# Patient Record
Sex: FEMALE | Race: BLACK OR AFRICAN AMERICAN | NOT HISPANIC OR LATINO | Employment: FULL TIME | URBAN - METROPOLITAN AREA
[De-identification: names, ages, dates, MRNs, and addresses within clinical notes are randomized per-mention and may not be internally consistent; named-entity substitution may affect disease eponyms.]

---

## 2017-03-29 ENCOUNTER — CONVERSION ENCOUNTER (OUTPATIENT)
Dept: MAMMOGRAPHY | Facility: HOSPITAL | Age: 42
End: 2017-03-29

## 2017-04-28 ENCOUNTER — CONVERSION ENCOUNTER (OUTPATIENT)
Dept: MAMMOGRAPHY | Facility: HOSPITAL | Age: 42
End: 2017-04-28

## 2017-09-28 ENCOUNTER — CONVERSION ENCOUNTER (OUTPATIENT)
Dept: MAMMOGRAPHY | Facility: HOSPITAL | Age: 42
End: 2017-09-28

## 2018-01-25 ENCOUNTER — OFFICE VISIT CONVERTED (OUTPATIENT)
Dept: PULMONOLOGY | Facility: CLINIC | Age: 43
End: 2018-01-25
Attending: INTERNAL MEDICINE

## 2018-02-09 ENCOUNTER — OFFICE VISIT CONVERTED (OUTPATIENT)
Dept: PULMONOLOGY | Facility: CLINIC | Age: 43
End: 2018-02-09
Attending: INTERNAL MEDICINE

## 2018-08-23 ENCOUNTER — OFFICE VISIT CONVERTED (OUTPATIENT)
Dept: PULMONOLOGY | Facility: CLINIC | Age: 43
End: 2018-08-23
Attending: INTERNAL MEDICINE

## 2018-10-24 ENCOUNTER — CONVERSION ENCOUNTER (OUTPATIENT)
Dept: OTHER | Facility: HOSPITAL | Age: 43
End: 2018-10-24

## 2019-01-18 ENCOUNTER — HOSPITAL ENCOUNTER (OUTPATIENT)
Dept: PERIOP | Facility: HOSPITAL | Age: 44
Setting detail: HOSPITAL OUTPATIENT SURGERY
Discharge: HOME | End: 2019-01-18
Attending: SPECIALIST

## 2019-01-18 LAB
ANION GAP SERPL CALC-SCNC: 13 MMOL/L (ref 8–19)
CHLORIDE SERPL-SCNC: 106 MMOL/L (ref 99–111)
CONV CO2: 25 MMOL/L (ref 22–32)
POTASSIUM SERPL-SCNC: 4.4 MMOL/L (ref 3.5–5.3)
SODIUM SERPL-SCNC: 140 MMOL/L (ref 135–147)

## 2019-01-29 ENCOUNTER — HOSPITAL ENCOUNTER (OUTPATIENT)
Dept: CARDIOLOGY | Facility: HOSPITAL | Age: 44
Discharge: HOME | End: 2019-01-29
Attending: NURSE PRACTITIONER

## 2019-02-04 ENCOUNTER — OFFICE VISIT CONVERTED (OUTPATIENT)
Dept: PODIATRY | Facility: CLINIC | Age: 44
End: 2019-02-04
Attending: PODIATRIST

## 2019-09-13 ENCOUNTER — HOSPITAL ENCOUNTER (OUTPATIENT)
Dept: LAB | Facility: HOSPITAL | Age: 44
Discharge: HOME | End: 2019-09-13
Attending: NURSE PRACTITIONER

## 2019-09-13 ENCOUNTER — OFFICE VISIT CONVERTED (OUTPATIENT)
Dept: PULMONOLOGY | Facility: CLINIC | Age: 44
End: 2019-09-13
Attending: INTERNAL MEDICINE

## 2019-09-13 LAB
25(OH)D3 SERPL-MCNC: 29.6 NG/ML (ref 30–100)
ALBUMIN SERPL-MCNC: 4.3 G/DL (ref 3.5–5)
ALBUMIN/GLOB SERPL: 1.7 {RATIO} (ref 1.4–2.6)
ALP SERPL-CCNC: 82 U/L (ref 42–98)
ALT SERPL-CCNC: 10 U/L (ref 10–40)
ANION GAP SERPL CALC-SCNC: 17 MMOL/L (ref 8–19)
AST SERPL-CCNC: 12 U/L (ref 15–50)
BASOPHILS # BLD AUTO: 0.08 10*3/UL (ref 0–0.2)
BASOPHILS NFR BLD AUTO: 0.7 % (ref 0–3)
BILIRUB SERPL-MCNC: 0.16 MG/DL (ref 0.2–1.3)
BUN SERPL-MCNC: 10 MG/DL (ref 5–25)
BUN/CREAT SERPL: 12 {RATIO} (ref 6–20)
CALCIUM SERPL-MCNC: 8.9 MG/DL (ref 8.7–10.4)
CHLORIDE SERPL-SCNC: 104 MMOL/L (ref 99–111)
CHOLEST SERPL-MCNC: 193 MG/DL (ref 107–200)
CHOLEST/HDLC SERPL: 3.5 {RATIO} (ref 3–6)
CONV ABS IMM GRAN: 0.04 10*3/UL (ref 0–0.2)
CONV CO2: 24 MMOL/L (ref 22–32)
CONV CREATININE URINE, RANDOM: 183.8 MG/DL (ref 10–300)
CONV IMMATURE GRAN: 0.4 % (ref 0–1.8)
CONV MICROALBUM.,U,RANDOM: 24.2 MG/L (ref 0–20)
CONV TOTAL PROTEIN: 6.8 G/DL (ref 6.3–8.2)
CREAT UR-MCNC: 0.82 MG/DL (ref 0.5–0.9)
DEPRECATED RDW RBC AUTO: 52.4 FL (ref 36.4–46.3)
EOSINOPHIL # BLD AUTO: 0.3 10*3/UL (ref 0–0.7)
EOSINOPHIL # BLD AUTO: 2.6 % (ref 0–7)
ERYTHROCYTE [DISTWIDTH] IN BLOOD BY AUTOMATED COUNT: 16.1 % (ref 11.7–14.4)
EST. AVERAGE GLUCOSE BLD GHB EST-MCNC: 114 MG/DL
GFR SERPLBLD BASED ON 1.73 SQ M-ARVRAT: >60 ML/MIN/{1.73_M2}
GLOBULIN UR ELPH-MCNC: 2.5 G/DL (ref 2–3.5)
GLUCOSE SERPL-MCNC: 85 MG/DL (ref 65–99)
HBA1C MFR BLD: 5.6 % (ref 3.5–5.7)
HCT VFR BLD AUTO: 38.7 % (ref 37–47)
HDLC SERPL-MCNC: 55 MG/DL (ref 40–60)
HGB BLD-MCNC: 12.1 G/DL (ref 12–16)
LDLC SERPL CALC-MCNC: 116 MG/DL (ref 70–100)
LYMPHOCYTES # BLD AUTO: 2.85 10*3/UL (ref 1–5)
LYMPHOCYTES NFR BLD AUTO: 25.1 % (ref 20–45)
MCH RBC QN AUTO: 27.6 PG (ref 27–31)
MCHC RBC AUTO-ENTMCNC: 31.3 G/DL (ref 33–37)
MCV RBC AUTO: 88.4 FL (ref 81–99)
MICROALBUMIN/CREAT UR: 13.2 MG/G{CRE} (ref 0–35)
MONOCYTES # BLD AUTO: 0.66 10*3/UL (ref 0.2–1.2)
MONOCYTES NFR BLD AUTO: 5.8 % (ref 3–10)
NEUTROPHILS # BLD AUTO: 7.41 10*3/UL (ref 2–8)
NEUTROPHILS NFR BLD AUTO: 65.4 % (ref 30–85)
NRBC CBCN: 0 % (ref 0–0.7)
OSMOLALITY SERPL CALC.SUM OF ELEC: 290 MOSM/KG (ref 273–304)
PLATELET # BLD AUTO: 412 10*3/UL (ref 130–400)
PMV BLD AUTO: 9.5 FL (ref 9.4–12.3)
POTASSIUM SERPL-SCNC: 4.1 MMOL/L (ref 3.5–5.3)
RBC # BLD AUTO: 4.38 10*6/UL (ref 4.2–5.4)
SODIUM SERPL-SCNC: 141 MMOL/L (ref 135–147)
T4 FREE SERPL-MCNC: 1.4 NG/DL (ref 0.9–1.8)
TRIGL SERPL-MCNC: 109 MG/DL (ref 40–150)
TSH SERPL-ACNC: 0.79 M[IU]/L (ref 0.27–4.2)
VIT B12 SERPL-MCNC: 410 PG/ML (ref 211–911)
VLDLC SERPL-MCNC: 22 MG/DL (ref 5–37)
WBC # BLD AUTO: 11.34 10*3/UL (ref 4.8–10.8)

## 2020-01-07 ENCOUNTER — OFFICE VISIT CONVERTED (OUTPATIENT)
Dept: PULMONOLOGY | Facility: CLINIC | Age: 45
End: 2020-01-07
Attending: PHYSICIAN ASSISTANT

## 2020-01-24 ENCOUNTER — HOSPITAL ENCOUNTER (OUTPATIENT)
Dept: GENERAL RADIOLOGY | Facility: HOSPITAL | Age: 45
Discharge: HOME | End: 2020-01-24
Attending: OBSTETRICS & GYNECOLOGY

## 2020-05-19 ENCOUNTER — TELEMEDICINE CONVERTED (OUTPATIENT)
Dept: GASTROENTEROLOGY | Facility: CLINIC | Age: 45
End: 2020-05-19
Attending: NURSE PRACTITIONER

## 2020-05-19 ENCOUNTER — HOSPITAL ENCOUNTER (OUTPATIENT)
Dept: LAB | Facility: HOSPITAL | Age: 45
Discharge: HOME | End: 2020-05-19
Attending: NURSE PRACTITIONER

## 2020-05-19 LAB
BASOPHILS # BLD AUTO: 0.09 10*3/UL (ref 0–0.2)
BASOPHILS NFR BLD AUTO: 0.7 % (ref 0–3)
CONV ABS IMM GRAN: 0.11 10*3/UL (ref 0–0.2)
CONV IMMATURE GRAN: 0.8 % (ref 0–1.8)
DEPRECATED RDW RBC AUTO: 53.1 FL (ref 36.4–46.3)
EOSINOPHIL # BLD AUTO: 0.54 10*3/UL (ref 0–0.7)
EOSINOPHIL # BLD AUTO: 4 % (ref 0–7)
ERYTHROCYTE [DISTWIDTH] IN BLOOD BY AUTOMATED COUNT: 16.3 % (ref 11.7–14.4)
HCT VFR BLD AUTO: 40.2 % (ref 37–47)
HGB BLD-MCNC: 12.4 G/DL (ref 12–16)
IRON SATN MFR SERPL: 12 % (ref 20–55)
IRON SERPL-MCNC: 53 UG/DL (ref 60–170)
LYMPHOCYTES # BLD AUTO: 3.26 10*3/UL (ref 1–5)
LYMPHOCYTES NFR BLD AUTO: 24.1 % (ref 20–45)
MCH RBC QN AUTO: 27.6 PG (ref 27–31)
MCHC RBC AUTO-ENTMCNC: 30.8 G/DL (ref 33–37)
MCV RBC AUTO: 89.5 FL (ref 81–99)
MONOCYTES # BLD AUTO: 0.76 10*3/UL (ref 0.2–1.2)
MONOCYTES NFR BLD AUTO: 5.6 % (ref 3–10)
NEUTROPHILS # BLD AUTO: 8.77 10*3/UL (ref 2–8)
NEUTROPHILS NFR BLD AUTO: 64.8 % (ref 30–85)
NRBC CBCN: 0 % (ref 0–0.7)
PLATELET # BLD AUTO: 368 10*3/UL (ref 130–400)
PMV BLD AUTO: 9.7 FL (ref 9.4–12.3)
RBC # BLD AUTO: 4.49 10*6/UL (ref 4.2–5.4)
TIBC SERPL-MCNC: 458 UG/DL (ref 245–450)
TRANSFERRIN SERPL-MCNC: 320 MG/DL (ref 250–380)
WBC # BLD AUTO: 13.53 10*3/UL (ref 4.8–10.8)

## 2020-07-14 LAB — SARS-COV-2 RNA SPEC QL NAA+PROBE: NOT DETECTED

## 2020-07-15 ENCOUNTER — HOSPITAL ENCOUNTER (OUTPATIENT)
Dept: GASTROENTEROLOGY | Facility: HOSPITAL | Age: 45
Setting detail: HOSPITAL OUTPATIENT SURGERY
Discharge: HOME | End: 2020-07-15
Attending: INTERNAL MEDICINE

## 2020-07-23 ENCOUNTER — HOSPITAL ENCOUNTER (OUTPATIENT)
Dept: LAB | Facility: HOSPITAL | Age: 45
Discharge: HOME | End: 2020-07-23

## 2020-07-23 LAB
25(OH)D3 SERPL-MCNC: 23.4 NG/ML (ref 30–100)
ANION GAP SERPL CALC-SCNC: 18 MMOL/L (ref 8–19)
BASOPHILS # BLD AUTO: 0.07 10*3/UL (ref 0–0.2)
BASOPHILS NFR BLD AUTO: 0.6 % (ref 0–3)
BUN SERPL-MCNC: 11 MG/DL (ref 5–25)
BUN/CREAT SERPL: 12 {RATIO} (ref 6–20)
CALCIUM SERPL-MCNC: 8.9 MG/DL (ref 8.7–10.4)
CHLORIDE SERPL-SCNC: 100 MMOL/L (ref 99–111)
CHOLEST SERPL-MCNC: 240 MG/DL (ref 107–200)
CHOLEST/HDLC SERPL: 4 {RATIO} (ref 3–6)
CONV ABS IMM GRAN: 0.09 10*3/UL (ref 0–0.2)
CONV CO2: 24 MMOL/L (ref 22–32)
CONV IMMATURE GRAN: 0.7 % (ref 0–1.8)
CREAT UR-MCNC: 0.91 MG/DL (ref 0.5–0.9)
DEPRECATED RDW RBC AUTO: 53.7 FL (ref 36.4–46.3)
EOSINOPHIL # BLD AUTO: 0.56 10*3/UL (ref 0–0.7)
EOSINOPHIL # BLD AUTO: 4.6 % (ref 0–7)
ERYTHROCYTE [DISTWIDTH] IN BLOOD BY AUTOMATED COUNT: 16.5 % (ref 11.7–14.4)
EST. AVERAGE GLUCOSE BLD GHB EST-MCNC: 131 MG/DL
FOLATE SERPL-MCNC: 18.2 NG/ML (ref 4.8–20)
GFR SERPLBLD BASED ON 1.73 SQ M-ARVRAT: >60 ML/MIN/{1.73_M2}
GLUCOSE SERPL-MCNC: 86 MG/DL (ref 65–99)
HBA1C MFR BLD: 6.2 % (ref 3.5–5.7)
HCT VFR BLD AUTO: 41.9 % (ref 37–47)
HDLC SERPL-MCNC: 60 MG/DL (ref 40–60)
HGB BLD-MCNC: 13.1 G/DL (ref 12–16)
IRON SATN MFR SERPL: 7 % (ref 20–55)
IRON SERPL-MCNC: 31 UG/DL (ref 60–170)
LDLC SERPL CALC-MCNC: 123 MG/DL (ref 70–100)
LYMPHOCYTES # BLD AUTO: 2.77 10*3/UL (ref 1–5)
LYMPHOCYTES NFR BLD AUTO: 22.7 % (ref 20–45)
MCH RBC QN AUTO: 27.5 PG (ref 27–31)
MCHC RBC AUTO-ENTMCNC: 31.3 G/DL (ref 33–37)
MCV RBC AUTO: 87.8 FL (ref 81–99)
MONOCYTES # BLD AUTO: 0.63 10*3/UL (ref 0.2–1.2)
MONOCYTES NFR BLD AUTO: 5.2 % (ref 3–10)
NEUTROPHILS # BLD AUTO: 8.1 10*3/UL (ref 2–8)
NEUTROPHILS NFR BLD AUTO: 66.2 % (ref 30–85)
NRBC CBCN: 0 % (ref 0–0.7)
OSMOLALITY SERPL CALC.SUM OF ELEC: 285 MOSM/KG (ref 273–304)
PLATELET # BLD AUTO: 392 10*3/UL (ref 130–400)
PMV BLD AUTO: 9.5 FL (ref 9.4–12.3)
POTASSIUM SERPL-SCNC: 4 MMOL/L (ref 3.5–5.3)
RBC # BLD AUTO: 4.77 10*6/UL (ref 4.2–5.4)
SODIUM SERPL-SCNC: 138 MMOL/L (ref 135–147)
T4 FREE SERPL-MCNC: 1.1 NG/DL (ref 0.9–1.8)
TIBC SERPL-MCNC: 465 UG/DL (ref 245–450)
TRANSFERRIN SERPL-MCNC: 325 MG/DL (ref 250–380)
TRIGL SERPL-MCNC: 287 MG/DL (ref 40–150)
TSH SERPL-ACNC: 0.65 M[IU]/L (ref 0.27–4.2)
VIT B12 SERPL-MCNC: 443 PG/ML (ref 211–911)
VLDLC SERPL-MCNC: 57 MG/DL (ref 5–37)
WBC # BLD AUTO: 12.22 10*3/UL (ref 4.8–10.8)

## 2020-08-05 ENCOUNTER — HOSPITAL ENCOUNTER (OUTPATIENT)
Dept: LAB | Facility: HOSPITAL | Age: 45
Discharge: HOME | End: 2020-08-05

## 2020-08-05 LAB
BASOPHILS # BLD AUTO: 0.07 10*3/UL (ref 0–0.2)
BASOPHILS # BLD: 0 % (ref 0–3)
BASOPHILS NFR BLD AUTO: 0.6 % (ref 0–3)
CONV ABS BANDS: 0 % (ref 1–5)
CONV ABS IMM GRAN: 0.11 10*3/UL (ref 0–0.2)
CONV ANISOCYTES: SLIGHT
CONV HYPOCHROMIA IN BLOOD BY LIGHT MICROSCOPY: SLIGHT
CONV IMMATURE GRAN: 0.9 % (ref 0–1.8)
CONV SEGMENTED NEUTROPHILS: 73 % (ref 45–70)
DEPRECATED RDW RBC AUTO: 54.5 FL (ref 36.4–46.3)
EOSINOPHIL # BLD AUTO: 0.51 10*3/UL (ref 0–0.7)
EOSINOPHIL # BLD AUTO: 4.3 % (ref 0–7)
EOSINOPHIL NFR BLD AUTO: 3 % (ref 0–7)
ERYTHROCYTE [DISTWIDTH] IN BLOOD BY AUTOMATED COUNT: 17 % (ref 11.7–14.4)
ERYTHROCYTE [SEDIMENTATION RATE] IN BLOOD: 47 MM/H (ref 0–20)
HCT VFR BLD AUTO: 40.4 % (ref 37–47)
HGB BLD-MCNC: 12.3 G/DL (ref 12–16)
LYMPHOCYTES # BLD AUTO: 2.39 10*3/UL (ref 1–5)
LYMPHOCYTES NFR BLD AUTO: 20 % (ref 20–45)
MACROCYTES BLD QL SMEAR: SLIGHT
MCH RBC QN AUTO: 26.8 PG (ref 27–31)
MCHC RBC AUTO-ENTMCNC: 30.4 G/DL (ref 33–37)
MCV RBC AUTO: 88 FL (ref 81–99)
MICROCYTES BLD QL: SLIGHT
MONOCYTES # BLD AUTO: 0.62 10*3/UL (ref 0.2–1.2)
MONOCYTES NFR BLD AUTO: 5.2 % (ref 3–10)
MONOCYTES NFR BLD MANUAL: 5 % (ref 3–10)
NEUTROPHILS # BLD AUTO: 8.24 10*3/UL (ref 2–8)
NEUTROPHILS NFR BLD AUTO: 69 % (ref 30–85)
NRBC CBCN: 0 % (ref 0–0.7)
NUC CELL # PRT MANUAL: 0 /100{WBCS}
PLAT MORPH BLD: NORMAL
PLATELET # BLD AUTO: 350 10*3/UL (ref 130–400)
PMV BLD AUTO: 9.6 FL (ref 9.4–12.3)
POIKILOCYTOSIS BLD QL SMEAR: SLIGHT
POLYCHROMASIA BLD QL SMEAR: SLIGHT
PROLACTIN SERPL-MCNC: 12.66 NG/ML
RBC # BLD AUTO: 4.59 10*6/UL (ref 4.2–5.4)
SMALL PLATELETS BLD QL SMEAR: ADEQUATE
VARIANT LYMPHS NFR BLD MANUAL: 19 % (ref 20–45)
WBC # BLD AUTO: 11.94 10*3/UL (ref 4.8–10.8)

## 2020-08-06 LAB
ASO AB SERPL-ACNC: 20 [IU]/ML (ref 0–200)
CONV RHEUMATOID FACTOR IGM: <10 [IU]/ML (ref 0–14)
CRP SERPL-MCNC: 18.8 MG/L (ref 0–5)
DSDNA AB SER-ACNC: NEGATIVE [IU]/ML
ENA AB SER IA-ACNC: NEGATIVE {RATIO}
URATE SERPL-MCNC: 4.5 MG/DL (ref 2.5–7.5)

## 2020-08-07 LAB — CONV TREPONEMA PALLIDUM (RPR) WITH FTA-ABS, TP-PA REFLEXES: NON REACTIVE

## 2020-08-27 ENCOUNTER — TELEMEDICINE CONVERTED (OUTPATIENT)
Dept: GASTROENTEROLOGY | Facility: CLINIC | Age: 45
End: 2020-08-27
Attending: NURSE PRACTITIONER

## 2020-09-08 ENCOUNTER — OFFICE VISIT CONVERTED (OUTPATIENT)
Dept: PULMONOLOGY | Facility: CLINIC | Age: 45
End: 2020-09-08
Attending: PHYSICIAN ASSISTANT

## 2020-09-16 ENCOUNTER — PROCEDURE VISIT CONVERTED (OUTPATIENT)
Dept: GASTROENTEROLOGY | Facility: CLINIC | Age: 45
End: 2020-09-16
Attending: NURSE PRACTITIONER

## 2020-11-24 ENCOUNTER — OFFICE VISIT CONVERTED (OUTPATIENT)
Dept: PULMONOLOGY | Facility: CLINIC | Age: 45
End: 2020-11-24
Attending: INTERNAL MEDICINE

## 2021-03-17 ENCOUNTER — HOSPITAL ENCOUNTER (OUTPATIENT)
Dept: MAMMOGRAPHY | Facility: HOSPITAL | Age: 46
Discharge: HOME | End: 2021-03-17
Attending: OBSTETRICS & GYNECOLOGY

## 2021-03-17 ENCOUNTER — TELEMEDICINE CONVERTED (OUTPATIENT)
Dept: GASTROENTEROLOGY | Facility: CLINIC | Age: 46
End: 2021-03-17
Attending: NURSE PRACTITIONER

## 2021-05-10 ENCOUNTER — OFFICE VISIT CONVERTED (OUTPATIENT)
Dept: GASTROENTEROLOGY | Facility: CLINIC | Age: 46
End: 2021-05-10
Attending: NURSE PRACTITIONER

## 2021-05-10 NOTE — PROCEDURES
Procedure Note      Patient Name: Jaycee Le   Patient ID: [id number]   Sex: Female   YOB: 1975    Primary Care Provider: Kia De León RD   Referring Provider: Kia De León RD    Visit Date: September 16, 2020    Provider: TAMIA Gannon   Location: [facility]   Location Address: [address] [place] [address], [address] [place] [address]  [id number]   Location Phone: [phone]          Jaycee Le presents today for Capsule Endoscopy Procedure for anemia. She successfully swallowed capsule without difficulty or complications.           Assessment  · H/O endoscopy     V45.89/Z98.890  · Anemia due to blood loss     280.0/D50.0      Plan  · Orders  o Capsule endoscopy esophagus through ileum [facility] (36425) - - 09/16/2020  · Instructions  o Please see Capsule endoscopy report scanned into patient record.             Electronically Signed by: Joan Sun, -Author on September 16, 2020 08:13:56 AM

## 2021-05-10 NOTE — H&P
History and Physical      Patient Name: Jaycee Le   Patient ID: [id number]   Sex: Female   YOB: 1975    Primary Care Provider: Provider Other Other    Visit Date: May 19, 2020    Provider: TAMIA Gannon   Location: [facility]   Location Address: [address] [place] [address], [address]  [place], [place]  [id number]   Location Phone: [phone]          Chief Complaint  · Abdominal pain      History Of Present Illness  Video Conferencing Visit  Jaycee Le is a 44 year old /Black female who is presenting for evaluation via video conferencing. Verbal consent obtained before beginning visit.   The following staff were present during this visit: Joan Sun MA, TAMIA Gannon      She presents for evaluation of worsening acid reflux.      Previously dx with IBS and GERD.  EGD/Colon about 10 years ago at [facility].      She reports that she's been using dicyclomine for pain and cramps.  She is using 20 mg PRN abdominal cramping with relief.  Also given linzess 145 mcg per PCP.  States that when she's constipated, after she has a bowel movement experiences rectal bleeding.  The past few months, she has noticed that the bleeding is worse.  She is straining with bowel movements.  Reports having a bowel movement 2-3 times per day.  Bleeding is not present with every bowel movement.  She states that when bleeding occurs, there is a large amount of blood.      She reports that belching has become severe.  This begins upon waking in the morning.  She is avoiding bothersome foods with some improvement.  TUMS PRN.  Denies dysphagia.             Past Medical History  Achilles tendon rupture; Anxiety; Apnea; Arthritis; Asthma; Bipolar disorder; Broken Bones; Bunion; Chronic bronchitis; Clot; COPD (chronic obstructive pulmonary disease); Depression; Foot pain, bilateral; Foot ulcer; Forgetfulness; GERD (gastroesophageal reflux disease); Magalie's deformity, right; Heart  Murmur; Heel pain; Hemorrhoids; High blood pressure; Hyperlipidemia; Hypertension; Hypothyroidism; IBS (irritable bowel syndrome); Kidney disease; Migraine Headaches; NIght Sweats; Numbness in feet; Obesity; Pain: Knee; Patella Chondromalacia; Patella Maltracking; Plantar fascial fibromatosis of both feet; Plantar wart; Polycystic ovarian syndrome; Shortness of breath; Sinus Trouble         Past Surgical History  Achilles tendon repair; Cesarian Section; Colonoscopy; Tubal ligation; Uterine ablation         Medication List  amlodipine 10 mg oral tablet; bupropion HCl 150 mg oral tablet extended release 24 hr; cetirizine 10 mg oral tablet; gemfibrozil 600 mg oral tablet; levothyroxine 25 mcg oral tablet; metformin 500 mg oral tablet; mirtazapine 7.5 mg oral tablet; montelukast 10 mg oral tablet; multivitamin oral tablet; NuLYTELY with Flavor Packs 420 gram oral recon soln; sertraline 50 mg oral tablet; Symbicort 160-4.5 mcg/actuation inhalation HFA aerosol inhaler; Vitamin D2 1,250 mcg (50,000 unit) oral capsule         Allergy List  Darvocet-N 100; doxycycline hyclate; lisinopril; oxycodone; vancomycin         Family Medical History  Lung Neoplasm, Malignant; Heart Disease; Diabetes, unspecified type; Prostate Cancer; Throat Cancer; Colon Cancer; Ovarian Cancer, Family History         Reproductive History   0 Para 0 0 0 0       Social History  Alcohol (Light); Cocaine (Former); Exercises regularly; MWM Goal Statement:; Tobacco (Current some day)         Immunizations  Name Date Admin   Influenza          Review of Systems  · Constitutional  o Denies  o : chills, fever  · Eyes  o Denies  o : blurred vision, changes in vision  · Cardiovascular  o Denies  o : chest pain, irregular heart beats  · Respiratory  o Denies  o : shortness of breath, cough  · Gastrointestinal  o Admits  o : See HPI  · Genitourinary  o Denies  o : dysuria, blood in urine  · Integument  o Denies  o : rash, new skin  "lesions  · Neurologic  o Denies  o : tingling or numbness, seizures  · Musculoskeletal  o Denies  o : joint pain, joint swelling  · Endocrine  o Denies  o : weight gain, weight loss  · Psychiatric  o Denies  o : anxiety, depression      Vitals  Date Time BP Position Site L\R Cuff Size HR RR TEMP (F) WT  HT  BMI kg/m2 BSA m2 O2 Sat HC       05/19/2020 09:37 AM         260lbs 0oz 5'  2\" 47.55 2.27           Physical Examination  · Constitutional  o Appearance  o : well developed, well-nourished, in no acute distress  · Eyes  o Vision  o :   § Visual Fields  § : eyes move symmetrical in all directions  o Sclerae  o : anicteric  o Pupils and Irises  o : pupils equal and symmetrical  · Neck  o Inspection/Palpation  o : supple  · Respiratory  o Respiratory Effort  o : breathing unlabored  · Lymphatic  o Neck  o : no palpable lymphadenopathy  · Skin and Subcutaneous Tissue  o General Inspection  o : no lesions present, no areas of discoloration  · Psychiatric  o General  o : Alert and oriented x3  o Mood and Affect  o : Mood and affect are appropriate to circumstances              Assessment  · Lower abdominal pain     789.09/R10.30  · Constipation     564.00/K59.00  · Dyspepsia     536.8/R10.13  · Rectal bleeding     569.3/K62.5      Plan  · Orders  o Iron panel (iron, TIBC, transferrin saturation) (50700, 67884, 78365) - - 05/19/2020  o CBC with Auto Diff [facility] (50720) - - 05/19/2020  o Consent for Colonoscopy with Possible Biopsy - Possible risks/complications, benefits, and alternatives to surgical or invasive procedure have been explained to patient and/or legal guardian. -Patient has been evaluated and can tolerate anesthesia and/or sedation. Risks, benefits, and alternatives to anesthesia and sedation have been explained to patient and/or legal guardian. (50861) - - 05/19/2020  o Consent for Esophagogastroduodenoscopy (EGD) - Possible risks/complications, benefits, and alternatives to surgical or invasive procedure " have been explained to patient and/or legal guardian. - Patient has been evaluated and can tolerate anesthesia and/or sedation. Risks, benefits, and alternatives to anesthesia and sedation have been explained to patient and/or legal guardian. (27834) - - 05/19/2020  · Medications  o Medications have been Reconciled  · Instructions  o Handouts provided: Pre-procedure instructions including date and time and location of procedure.  o PLAN: Proceed with procedure. Patient understands risks and benefits and is willing to proceed. Understands the risks include, but are not limited to, bleeding and/or perforation.  o Information given on current diagnoses.  o Electronically Identified Patient Education Materials Provided Electronically  · Disposition  o Follow Up after procedure            Electronically Signed by: TAMIA Gannon -Author on May 19, 2020 12:04:06 PM

## 2021-05-13 NOTE — PROGRESS NOTES
Progress Note      Patient Name: Jaycee Le   Patient ID: [id number]   Sex: Female   YOB: 1975    Primary Care Provider: Provider Other Other    Visit Date: August 27, 2020    Provider: TAMIA Gannon   Location: [facility]   Location Address: [address] [place] [address], [address]  [place], [place]  [id number]   Location Phone: [phone]          Chief Complaint  · Follow up Abdominal pain       History Of Present Illness  Video Conferencing Visit  Jaycee Le is a 44 year old /Black female who is presenting for evaluation via video conferencing via zoom. Verbal consent obtained before beginning visit.   The following staff were present during this visit: Joan Sun MA      Ms. Le presents for follow-up of lower abdominal pain, constipation, dyspepsia and rectal bleeding.    7/15/2020 EGD/colonoscopy-erythema in the GE junction-acute and chronic inflammation with changes suggestive of reflux esophagitis.  Small hiatal hernia.  Erythema in the antrum-features suggestive of mild reactive gastropathy.  Normal duodenum.  2 mm polyp in the transverse colon-hyperplastic, completely removed.  Grade 1 internal hemorrhoids.    5/19/2020 CBC:  WBC 13.53, Hgb 12.4, Hct 40.2, plt 368.   Iron profile: Iron 53, Iron sat 12%.     She reports that rectal bleeding is resolved.  She is not straining with bowel movements.      Reports that she's taking protonix 20 mg daily.  Belching is improved.  States that she's had 1-2 episodes of reflux in the past few weeks.    She is moving to [place].       Past Medical History  Achilles tendon rupture; Anxiety; Apnea; Arthritis; Asthma; Bipolar disorder; Broken Bones; Bunion; Chronic bronchitis; Clot; COPD (chronic obstructive pulmonary disease); Depression; Foot pain, bilateral; Foot ulcer; Forgetfulness; GERD (gastroesophageal reflux disease); Magalie's deformity, right; Heart Murmur; Heel pain; Hemorrhoids; High blood  "pressure; Hyperlipidemia; Hypertension; Hypothyroidism; IBS (irritable bowel syndrome); Kidney disease; Migraine Headaches; NIght Sweats; Numbness in feet; Obesity; Pain: Knee; Patella Chondromalacia; Patella Maltracking; Plantar fascial fibromatosis of both feet; Plantar wart; Polycystic ovarian syndrome; Shortness of breath; Sinus Trouble         Past Surgical History  Achilles tendon repair; Cesarian Section; Colonoscopy; Tubal ligation; Uterine ablation         Medication List  amlodipine 10 mg oral tablet; bupropion HCl 150 mg oral tablet extended release 24 hr; cetirizine 10 mg oral tablet; gemfibrozil 600 mg oral tablet; levothyroxine 25 mcg oral tablet; metformin 500 mg oral tablet; mirtazapine 7.5 mg oral tablet; montelukast 10 mg oral tablet; multivitamin oral tablet; pantoprazole 40 mg oral tablet,delayed release (DR/EC); sertraline 50 mg oral tablet; Symbicort 160-4.5 mcg/actuation inhalation HFA aerosol inhaler; Vitamin D2 1,250 mcg (50,000 unit) oral capsule         Allergy List  Darvocet-N 100; doxycycline hyclate; lisinopril; oxycodone; vancomycin         Family Medical History  Lung Neoplasm, Malignant; Heart Disease; Diabetes, unspecified type; Prostate Cancer; Throat Cancer; Colon Cancer; Ovarian Cancer, Family History         Reproductive History   0 Para 0 0 0 0       Social History  Alcohol (Light); Cocaine (Former); Exercises regularly; MWM Goal Statement:; Tobacco (Current some day)         Immunizations  Name Date Admin   Influenza          Review of Systems  · Constitutional  o Denies  o : chills, fever  · Cardiovascular  o Denies  o : chest pain, irregular heart beats  · Respiratory  o Denies  o : cough, shortness of breath  · Gastrointestinal  o Admits  o : see HPI   · Endocrine  o Denies  o : weight gain, weight loss      Vitals  Date Time BP Position Site L\R Cuff Size HR RR TEMP (F) WT  HT  BMI kg/m2 BSA m2 O2 Sat        2020 10:04 AM         260lbs 0oz 5'  2\" 47.55 2.27 "           Physical Examination  · Constitutional  o Appearance  o : Healthy-appearing, awake and alert in no acute distress  · Head and Face  o Head  o : Normocephalic with no worriesome skin lesions  · Eyes  o Vision  o :   § Visual Fields  § : eyes move symmetrical in all directions  o Sclerae  o : sclerae anicteric  o Pupils and Irises  o : pupils equal and symmetrical  · Neck  o Inspection/Palpation  o : normal appearance, trachea midline  · Respiratory  o Respiratory Effort  o : Breathing is unlabored.  · Skin and Subcutaneous Tissue  o General Inspection  o : no lesions present, no areas of discoloration  · Psychiatric  o General  o : Alert and oriented x3  o Mood and Affect  o : Mood and affect are appropriate to circumstances          Assessment  · Abdominal Pain, RUQ     789.01/R10.11  · Iron deficiency anemia due to chronic blood loss     280.0/D50.0  · Hyperplastic polyp of ascending colon     211.3/K63.5  · Esophagitis, Reflux     530.11/K21.0  · Gastritis, Other specified     535.40      Plan  · Orders  o Capsule endoscopy esophagus through ileum [facility] (29499) - - 08/27/2020  · Medications  o pantoprazole 40 mg oral tablet,delayed release (DR/EC)   SIG: take 1 tablet (40 mg) by oral route once daily for 90 days   DISP: (90) tablets with 1 refills  Adjusted on 08/27/2020     o Medications have been Reconciled  · Instructions  o Information given on current diagnoses.  · Disposition  o Follow up 4 months            Electronically Signed by: Roxy Bowens APRN -Author on August 27, 2020 06:09:46 PM

## 2021-05-14 VITALS — WEIGHT: 270 LBS | BODY MASS INDEX: 49.38 KG/M2

## 2021-05-14 VITALS — BODY MASS INDEX: 47.84 KG/M2 | WEIGHT: 260 LBS | HEIGHT: 62 IN

## 2021-05-14 NOTE — PROGRESS NOTES
Progress Note      Patient Name: Jaycee Le   Patient ID: [id number]   Sex: Female   YOB: 1975    Primary Care Provider: Kia De León RD    Visit Date: March 17, 2021    Provider: TAMIA Gannon   Location: [facility]   Location Address: [address] [place] [address], [address]  [place], [place]  [id number]   Location Phone: [phone]          Chief Complaint  · Follow up GERD      History Of Present Illness  Video Conferencing Visit  Jaycee Le is a 45 year old /Black female who is presenting for evaluation via video conferencing via zoom. Verbal consent obtained before beginning visit.   The following staff were present during this visit: Yenni Araya MA      Ms. Le presents for follow-up of right upper quadrant abdominal pain, iron deficiency anemia, reflux esophagitis and gastritis.    9/16/2020 capsule endoscopy-petechiae in the proximal ileum and mid ileum, otherwise normal exam.    She reports that for the past 2 weeks, she's noticed abdominal pain in the evening and late at night.  This is usually relieved with dicyclomine.      She states that constipation is controlled with linzess.      Pantoprazole is controlling heartburn.       Past Medical History  Achilles tendon rupture; Anxiety; Apnea; Arthritis; Asthma; Bipolar disorder; Broken Bones; Bunion; Chronic bronchitis; Clot; COPD (chronic obstructive pulmonary disease); Depression; Foot pain, bilateral; Foot ulcer; Forgetfulness; GERD (gastroesophageal reflux disease); Magalie's deformity, right; Heart Murmur; Heel pain; Hemorrhoids; High blood pressure; Hyperlipidemia; Hypertension; Hypothyroidism; IBS (irritable bowel syndrome); Kidney disease; Migraine Headaches; NIght Sweats; Numbness in feet; Obesity; Pain: Knee; Patella Chondromalacia; Patella Maltracking; Plantar fascial fibromatosis of both feet; Plantar wart; Polycystic ovarian syndrome; Shortness of breath; Sinus Trouble          Past Surgical History  Achilles tendon repair; Cesarian Section; Colonoscopy; Tubal ligation; Uterine ablation         Medication List  amlodipine 10 mg oral tablet; bupropion HCl 150 mg oral tablet extended release 24 hr; cetirizine 10 mg oral tablet; gemfibrozil 600 mg oral tablet; levothyroxine 25 mcg oral tablet; Linzess 145 mcg oral capsule; metformin 500 mg oral tablet; mirtazapine 7.5 mg oral tablet; montelukast 10 mg oral tablet; multivitamin oral tablet; pantoprazole 40 mg oral tablet,delayed release (DR/EC); sertraline 50 mg oral tablet; Vitamin D2 1,250 mcg (50,000 unit) oral capsule         Allergy List  Darvocet-N 100; doxycycline hyclate; lisinopril; oxycodone; vancomycin       Allergies Reconciled  Family Medical History  Lung Neoplasm, Malignant; Heart Disease; Diabetes, unspecified type; Prostate Cancer; Throat Cancer; Colon Cancer; Ovarian Cancer, Family History         Reproductive History   0 Para 0 0 0 0       Social History  Alcohol (Light); Cocaine (Former); Exercises regularly; MWM Goal Statement:; Tobacco (Current some day)         Immunizations  Name Date Admin   Influenza 11/15/2013         Review of Systems  · Constitutional  o Denies  o : chills, fever  · Cardiovascular  o Denies  o : chest pain, irregular heart beats  · Respiratory  o Denies  o : cough, shortness of breath  · Gastrointestinal  o Admits  o : see HPI   · Endocrine  o Denies  o : weight gain, weight loss      Vitals  Date Time BP Position Site L\R Cuff Size HR RR TEMP (F) WT  HT  BMI kg/m2 BSA m2 O2 Sat FR L/min FiO2 HC       2021 09:47 AM         270lbs 0oz                Physical Examination  · Constitutional  o Appearance  o : Healthy-appearing, awake and alert in no acute distress  · Head and Face  o Head  o : Normocephalic with no worriesome skin lesions  · Eyes  o Vision  o :   § Visual Fields  § : eyes move symmetrical in all directions  o Sclerae  o : sclerae anicteric  o Pupils and Irises  o :  pupils equal and symmetrical  · Neck  o Inspection/Palpation  o : normal appearance, trachea midline  · Respiratory  o Respiratory Effort  o : Breathing is unlabored.  o Inspection of Chest  o : normal appearance  · Skin and Subcutaneous Tissue  o General Inspection  o : no lesions present, no areas of discoloration  · Psychiatric  o General  o : Alert and oriented x3  o Mood and Affect  o : Mood and affect are appropriate to circumstances          Assessment  · Constipation     564.00/K59.00  · Gastroesophageal Reflux     530.81/K21.9  · Irritable Bowel Syndrome     564.1/K58.9      Plan  · Medications  o dicyclomine 20 mg oral tablet   SIG: take 1 tablet by oral route 3 times a day abdominal pain/cramping   DISP: (120) Tablet with 3 refills  Prescribed on 03/17/2021     o Linzess 145 mcg oral capsule   SIG: take 1 capsule (145 mcg) by oral route once daily on an empty stomach at least 30 minutes before 1st meal of the day   DISP: (90) Capsule with 3 refills  Refilled on 03/17/2021     o pantoprazole 40 mg oral tablet,delayed release (DR/EC)   SIG: take 1 tablet (40 mg) by oral route once daily for 90 days   DISP: (90) Tablet with 3 refills  Refilled on 03/17/2021     o Medications have been Reconciled  · Instructions  o Information given on current diagnoses.  · Disposition  o Follow up 1 year            Electronically Signed by: TAMIA Gannon -Author on March 17, 2021 10:00:01 AM

## 2021-05-15 VITALS — WEIGHT: 260 LBS | BODY MASS INDEX: 47.84 KG/M2 | HEIGHT: 62 IN

## 2021-05-16 VITALS
DIASTOLIC BLOOD PRESSURE: 87 MMHG | BODY MASS INDEX: 46.82 KG/M2 | WEIGHT: 256 LBS | OXYGEN SATURATION: 99 % | SYSTOLIC BLOOD PRESSURE: 134 MMHG | HEART RATE: 92 BPM

## 2021-05-22 ENCOUNTER — TRANSCRIBE ORDERS (OUTPATIENT)
Dept: CARDIAC REHAB | Facility: HOSPITAL | Age: 46
End: 2021-05-22

## 2021-05-22 DIAGNOSIS — J45.901 ACUTE SEVERE ASTHMA: Primary | ICD-10-CM

## 2021-05-23 ENCOUNTER — TRANSCRIBE ORDERS (OUTPATIENT)
Dept: ADMINISTRATIVE | Facility: HOSPITAL | Age: 46
End: 2021-05-23

## 2021-05-23 DIAGNOSIS — J40 BRONCHITIS: Primary | ICD-10-CM

## 2021-05-23 DIAGNOSIS — J45.909 ASTHMA, UNSPECIFIED ASTHMA SEVERITY, UNSPECIFIED WHETHER COMPLICATED, UNSPECIFIED WHETHER PERSISTENT: ICD-10-CM

## 2021-05-26 ENCOUNTER — TRANSCRIBE ORDERS (OUTPATIENT)
Dept: ADMINISTRATIVE | Facility: HOSPITAL | Age: 46
End: 2021-05-26

## 2021-05-26 DIAGNOSIS — J40 BRONCHITIS: Primary | ICD-10-CM

## 2021-05-26 DIAGNOSIS — J45.901 ASTHMA WITH ACUTE EXACERBATION, UNSPECIFIED ASTHMA SEVERITY, UNSPECIFIED WHETHER PERSISTENT: ICD-10-CM

## 2021-05-28 VITALS
HEART RATE: 88 BPM | OXYGEN SATURATION: 100 % | DIASTOLIC BLOOD PRESSURE: 76 MMHG | HEIGHT: 62 IN | HEART RATE: 88 BPM | BODY MASS INDEX: 48.4 KG/M2 | WEIGHT: 260.06 LBS | TEMPERATURE: 98.4 F | TEMPERATURE: 98.6 F | HEIGHT: 62 IN | HEART RATE: 84 BPM | RESPIRATION RATE: 16 BRPM | WEIGHT: 262.12 LBS | SYSTOLIC BLOOD PRESSURE: 124 MMHG | OXYGEN SATURATION: 95 % | BODY MASS INDEX: 47.86 KG/M2 | RESPIRATION RATE: 16 BRPM | RESPIRATION RATE: 16 BRPM | HEIGHT: 62 IN | BODY MASS INDEX: 48.24 KG/M2 | SYSTOLIC BLOOD PRESSURE: 114 MMHG | WEIGHT: 263 LBS | TEMPERATURE: 98 F | DIASTOLIC BLOOD PRESSURE: 90 MMHG | OXYGEN SATURATION: 100 %

## 2021-05-28 VITALS
HEART RATE: 71 BPM | OXYGEN SATURATION: 97 % | BODY MASS INDEX: 49.13 KG/M2 | DIASTOLIC BLOOD PRESSURE: 82 MMHG | SYSTOLIC BLOOD PRESSURE: 134 MMHG | WEIGHT: 267 LBS | HEIGHT: 62 IN | TEMPERATURE: 98.4 F | RESPIRATION RATE: 18 BRPM

## 2021-05-28 VITALS
RESPIRATION RATE: 16 BRPM | BODY MASS INDEX: 49.3 KG/M2 | WEIGHT: 261.12 LBS | HEART RATE: 87 BPM | OXYGEN SATURATION: 98 % | DIASTOLIC BLOOD PRESSURE: 92 MMHG | HEIGHT: 61 IN | SYSTOLIC BLOOD PRESSURE: 141 MMHG | TEMPERATURE: 98.3 F

## 2021-05-28 VITALS
RESPIRATION RATE: 22 BRPM | HEIGHT: 62 IN | SYSTOLIC BLOOD PRESSURE: 120 MMHG | BODY MASS INDEX: 48.85 KG/M2 | TEMPERATURE: 99.1 F | DIASTOLIC BLOOD PRESSURE: 85 MMHG | HEART RATE: 103 BPM | WEIGHT: 265.44 LBS | OXYGEN SATURATION: 93 %

## 2021-05-28 VITALS
DIASTOLIC BLOOD PRESSURE: 75 MMHG | OXYGEN SATURATION: 98 % | BODY MASS INDEX: 49 KG/M2 | RESPIRATION RATE: 14 BRPM | WEIGHT: 266.25 LBS | HEART RATE: 84 BPM | HEIGHT: 62 IN | SYSTOLIC BLOOD PRESSURE: 119 MMHG | TEMPERATURE: 97.5 F

## 2021-05-28 NOTE — PROGRESS NOTES
Patient: MARINO RAMIRES     Acct: [id number]     Report: #[id number]  UNIT #: [id number]     : 1975    Encounter Date:2020  PRIMARY CARE: ROCHELLE SWENSON  ***Signed***  --------------------------------------------------------------------------------------------------------------------  Chief Complaint      Encounter Date      Sep 8, 2020            Primary Care Provider            Monica Scott            Referring Provider      SELF,REFERRED            Patient Complaint      Patient is complaining of      Pt here for fu, COPD            VITALS      Height 5 ft 2 in / 157.48 cm      Weight 266 lbs 4 oz / 120.012219 kg      BSA 2.16 m2      BMI 48.7 kg/m2      Temperature 97.5 F / 36.39 C - Temporal      Pulse 84      Respirations 14      Blood Pressure 119/75 Sitting, Left Arm      Pulse Oximetry 98%, Room air            HPI      The patient is a 44 year old  female patient of Dr. Christensen's also    known to me from a previous office visit in 2020. She has a history of     obstructive sleep apnea on nightly CPAP and has previously been very compliant     with this. She also has a history of asthma and had mild air trapping seen on     pulmonary function test from 2017. She has been doing well on Symbicort 160 at     her last office visit and also takes Singulair, Zyrtec, flonase and astelin for     seasonal allergies. The patient reports being more short of breath over the past    several months. She finds she is getting short of breath and has to stop going     up 1 flight of what she describes as very steep steps at her fiance's house.     This did not previously happen to her. She denies coughing or wheezing, h    emoptysis, fever or chills or  purulent sputum production. She feels like the     Symbicort helps but still requires albuterol 1-2 times a day every day. She     denies nocturnal awakenings, nocturnal coughing or wheezing. She is requesting a    nebulizer machine as  she does not have one currently. She has used DuoNeb in her    son's machine and felt like they helped.             I reviewed the Review of Systems, medical, surgical and family history and agree    with those as entered.      Copies To:   Torsten Christensen      Constitutional:  Denies: Fatigue, Fever, Weight gain, Weight loss, Chills,     Insomnia, Other      Respiratory/Breathing:  Complains of: Shortness of air; Denies: Wheezing, Cough,    Hemoptysis, Pleuritic pain, Other      Endocrine:  Denies: Polydipsia, Polyuria, Heat/cold intolerance, Abnorml     menstrual pattern, Diabetes, Other      Eyes:  Denies: Blurred vision, Vision Changes, Other      Ears, nose, mouth, throat:  Denies: Congestion, Dysphagia, Hearing Changes, Nose    Bleeding, Nasal Discharge, Throat pain, Tinnitus, Other      Cardiovascular:  Denies: Chest Pain, Exertional dyspnea, Peripheral Edema,     Palpitations, Syncope, Wake up Gasping for air, Orthopnea, Tachycardia, Other      Gastrointestinal:  Denies: Abdominal pain/cramping, Bloody stools, Constipation,    Diarrhea, Melena, Nausea, Vomiting, Other      Genitourinary:  Denies: Dysuria, Urinary frequency, Incontinence, Hematuria,     Urgency, Other      Musculoskeletal:  Denies: Joint Pain, Joint Stiffness, Joint Swelling, Myalgias,    Other      Hematologic/lymphatic:  DENIES: Lymphadenopathy, Bruising, Bleeding tendencies,     Other      Neurologic:  Denies: Headache, Numbness, Weakness, Seizures, Other      Psychiatric:  Denies: Anxiety, Appropriate Effect, Depression, Other      Sleep:  No: Excessive daytime sleep, Morning Headache?, Snoring, Insomnia?, Stop    breathing at sleep?, Other      Integumentary:  Denies: Rash, Dry skin, Skin Warm to Touch, Other            FAMILY/SOCIAL/MEDICAL HX      Surgical History:  Yes: Bowel Surgery (COLONOSCOPY), Oral Surgery (WISDOM TEETH     CUT OUT 1996), Orthopedic Surgery (RIGHT ACHILLES TENDON REPAIR SEPT 2015, RIGHT    ACHILLES  I  ligation); No: AAA Repair, Abdominal Surgery, Angioplasty, Appendectomy, Back     Surgery, Bladder Surgery, Breast Surgery, CABG, Carotid Stenosis,     Cholecystectomy, Ear Surgery, Eye Surgery, Head Surgery, Hernia Surgery, Kidney     Surgery, Nose Surgery, Prostatectomy, Rectal Surgery, Spinal Surgery, Testicular    Surgery, Throat Surgery, Valve Replacement, Vascular Surgery      Other Family Medical History:  Brother, Sister      Is Father Still Living?:  No      Is Mother Still Living?:  No      Social History:  Tobacco Use; No Alcohol Use, No Recreational Drug use      Smoking status:  Current every day smoker (22yo, .5ppd)       Section:  Yes (, )      Tubal Ligation:  Yes      Anticoagulation Therapy:  No      Medical History:  Yes: Arthritis (knees, neck), Asthma (INHALER ), Chemical     Dependency, Chronic Bronchitis/COPD (INHALER), Depression, Anxiety, Bipolar     Disorder (DX 2007 BY [employer]), Hemorrhoids/Rectal Prob (GERD, IBS), High    Blood Pressure (ON MED), Migrane Headaches, Shortness Of Breath, Thyroid     Problem; No: Anemia, Blood Disease, Broken Bones, Cataracts,     Chemotherapy/Cancer, Emphysema, Chronic Liver Disease, Colon Trouble, Colitis,     Diverticulitis, Congestive Heart Failu, Deafness or Ringing Ears, Diabetes,     Epilepsy, Seizures, Glaucoma, Gall Stones, Gout, Head Injury, Heart Attack (HX     OF SYNCOPE CARDIAC WORK UP ), Heart Murmur, Hepatitis, Hiatal Hernia, HIV (Do     not ask - volu, Kidney or Bladder Disease, Kidney Stones, Mitral Valve Prolapse,    Psychiatric Care, Reflux Disease, Rheumatic Fever, Sexually Transmitted Dis,     Sinus Trouble, Skin Disease/Psoriais/Ecz, Stroke, Tuberculosis or Pos TB Te,     Miscellaneous Medical/oth      Psychiatric History      Anxiety, depression and bipolar            PREVENTION      Hx Influenza Vaccination:  Yes      Date Influenza Vaccine Given:  Oct 1, 2019      Influenza Vaccine Declined:  No      2 or  More Falls in Past Year?:  No      Fall Past Year with Injury?:  No      Hx Pneumococcal Vaccination:  Yes      Encouraged to follow-up with:  PCP regarding preventative exams.      Chart initiated by      Pascale Jackson MA            ALLERGIES/MEDICATIONS      Allergies:        Coded Allergies:             VANCOMYCIN (Verified  Allergy, Severe, RASH, RENAL FAILURE, 9/8/20)           LISINOPRIL (Unverified  Adverse Reaction, Unknown, NAUSEA AND DIZZY,     9/8/20)           PROPOXYPHENE NAPSYLATE (Verified  Adverse Reaction, Unknown, NAUSEA     VOMITING, ITCH, 9/8/20)      Medications    Last Reconciled on 9/8/20 11:45 by LAINE BAILEY      hydrOXYzine HCL (hydrOXYzine HCL) 25 Mg Tablet      25 MG PO QID, #120 TAB 0 Refills         Reported         9/8/20       Omega-3/Dha/Epa/Fish Oil (Fish Oil 500 Mg) 1 Each Capsule.dr      500 MG PO HS, #30 CAP 0 Refills         Reported         9/8/20       Multivitamins (Multi-Vitamin) 1 Each Tablet      1 TAB PO QDAY, #30 TAB 0 Refills         Reported         9/8/20       Jeremias-Fluticasone (Fluticasone 50 mcg) 16 Gm Spray.susp      1 PUFFS NARE EACH BID, #1 BOTTLE 0 Refills         Reported         9/8/20       Azelastine Hcl (Azelastine Nasal) 137 Mcg/0.137 Ml Spray.pump      1 PUFFS NARE EACH BID, #1 BOTTLE         Reported         9/8/20       Ferrous Sulfate (Iron Sulfate*) 325 Mg Tablet      325 MG PO QDAY, #30 TAB 0 Refills         Reported         9/8/20       metFORMIN ER (Glucophage XR) 500 Mg Tab.er.24h      500 MG PO BID, #60 TAB.ER 0 Refills         Reported         9/8/20       MDI-Albuterol (Ventolin HFA) 8 Gm Hfa.aer.ad      2 PUFFS INH Q4-6H PRN for DYSPNEA, #1 INH 2 Refills         Prov: TEAGAN PINEDO PA-C         8/20/20       Montelukast Sodium (Singulair*) 10 Mg Tab      10 MG PO HS, #90 TAB 4 Refills         Prov: TEAGAN PINEDO PA-C         8/20/20       Pantoprazole (Protonix) 20 Mg Tablet.      20 MG PO QDAY, #30 TAB 0 Refills          Prov: LEVAR STEPHENS         7/15/20       Aspirin EC (Aspirin EC) 81 Mg Tablet.      81 MG PO QDAY, #30 TAB.EC 0 Refills         Reported         7/13/20       Gemfibrozil (Gemfibrozil*) 600 Mg Tablet      600 MG PO BID, TAB         Reported         7/13/20       Linaclotide (Linzess) 145 Mcg Capsule      145 MCG PO QDAY, CAP         Reported         7/13/20       Mirtazapine (Remeron) 15 Mg Tablet      7.5 MG PO HS, #15 TAB 0 Refills         Reported         7/13/20       Budesonide/Formoterol Fumarate (Symbicort 160/4.5 Mcg) 10.2 Gm Inh      2 PUFF INH RTBID, #1 INH 0 Refills         Reported         7/13/20       buPROPion HCl XL (Wellbutrin XL) 150 Mg Tab.er.24h      150 MG PO QDAY for 30 Days, #30 TAB.ER.24H         Reported         7/13/20       Sertraline HCl (Sertraline*) 100 Mg Tablet      100 MG PO QDAY, #30 TAB 0 Refills         Reported         7/13/20       Levothyroxine Sodium (Levoxyl*) 0.112 Mg Tablet      0.112 MG PO QDAY@07, #30 TAB 0 Refills         Reported         7/13/20       Cetirizine Hcl (zyrTEC) 10 Mg Tablet      10 MG PO QDAY, #30 TAB 11 Refills         Prov: TEAGAN PINEDO PA-C         1/7/20       amLODIPine (amLODIPine) 10 Mg Tablet      10 MG PO QDAY, #30 TAB 0 Refills         Reported         11/23/15      Current Medications      Current Medications Reviewed 9/8/20            EXAM      CONSTITUTIONAL: Pleasant female in no acute distress,  normal conversant.       EYES : Pink conjunctive, no ptosis, PERRL.       ENMT : Nose and ears appear normal, normal dentition, mild posterior pharyngeal     wall erythema, no sinus tenderness. Mallampati classification       Neck: Nontender, no masses, no thyromegaly, no nodules.      Resp : Mildly decreased breath sounds throughout, no wheezes, rhonchi or     crackles, normal work of breathing noted.        CVS  : No carotid bruits, s1s2 nl, RRR, no murmur, rubs or gallop, no peripheral    edema       Chest wall: Normal rise with  inspiration, nontender on palpation.      GI   : Abdomen soft, with no masses, no hepatosplenomegaly, no hernias, BS+      MSK  : Normal gait and station, no digital cyanosis or clubbing       Skin : No rashes, ulcerations or lesions, normal turgor and temperature      Neuro: CN II - XII intact, no sensory deficits, DTRs intact and symmetrical, no     motor weakness      Psych: Appropriate affect, A   Vitals      Vitals:             Height 5 ft 2 in / 157.48 cm           Weight 266 lbs 4 oz / 120.225015 kg           BSA 2.16 m2           BMI 48.7 kg/m2           Temperature 97.5 F / 36.39 C - Temporal           Pulse 84           Respirations 14           Blood Pressure 119/75 Sitting, Left Arm           Pulse Oximetry 98%, Room air            REVIEW      Results Reviewed      P[facility] Results Reviewed?:  Yes Prev Lab Results, Yes Prev Radiology Results, Yes     Previous [facility]ec[facility] Records            Assessment      Shortness of breath - R06.02            Notes      New Medications      * metFORMIN ER (Glucophage XR) 500 MG TAB.ER.24H: 500 MG PO BID #60      * FERROUS SULFATE (Iron Sulfate*) 325 MG TABLET: 325 MG PO QDAY #30      * AZELASTINE HCL (Azelastine Nasal) 137 MCG/0.137 ML SPRAY.PUMP: 1 PUFFS NARE       EACH BID #1      * Jeremias-Fluticasone (Fluticasone 50 mcg) 16 GM SPRAY.SUSP: 1 PUFFS NARE EACH BID       #1      * MULTIVITAMINS (Multi-Vitamin) 1 EACH TABLET: 1 TAB PO QDAY #30      * Omega-3/Dha/Epa/Fish Oil (Fish Oil 500 Mg) 1 EACH CAPSULE.DR: 500 MG PO HS #30      * hydrOXYzine HCL 25 MG TABLET: 25 MG PO QID #120      * TIOTROPIUM BROMIDE (Spiriva Respimat 2.5 mcg/Puff) 4 GM MIST.INHAL: 2 PUFFS       INH RTQDAY #1         Dx: Shortness of breath - R06.02      * Albuterol/Ipratropium (Duoneb) 3 ML AMPUL.NEB: 3 ML INH Q4H PRN SHORTNESS OF       BREATH #120         Instructions: DIAGNOSIS CODE REQUIRED PRIOR TO PRESCRIBING.         Dx: Shortness of breath - R06.02      * Nicotine Polacrilex (Nicorette) 2 MG GUM: 2 MG  PO Q4-6H #180         Dx: Shortness of breath - R06.02      * Nicotine Polacrilex (Nicotine) 2 MG LOZENGE: 2 MG BUCCAL Q4-6H #180         Dx: Shortness of breath - R06.02      Renewed Medications      * CETIRIZINE HCL (zyrTEC) 10 MG TABLET: 10 MG PO QDAY #30      New Diagnostics      * PFT-Comp, PrePost,DLCO,BodyBox, 2 Week         Dx: Shortness of breath - R06.02      * Echo Complete, 2 Week         Dx: Shortness of breath - R06.02      * [facility] Pre-Op Covid Screening, Routine         Dx: Encounter for screening for other viral diseases - Z11.59      ASSESSMENT:      1. Obstructive sleep apnea well controlled on nightly CPAP.       2. Severe persistent asthma currently appears not well controlled.       3. Exertional dyspnea gradually worsening.       4. Morbid obesity with BMI 48.7.       5. Seasonal allergies and postnasal drip appear well controlled on Singulair,     Zyrtec, flonase and astelin.       6. Cardiac murmur previously followed by cardiology but not seen recently.             PLAN:      1. I have discussed with the patient regarding current symptoms and plans going     forward.       2. Given her increased albuterol use and gradually worsening dyspnea, I will try    adding spiriva 2.5 2 puffs once daily to Symbicort 160. I have given her samples    and showed her how to use it today.       3. Continue albuterol as needed and hopefully her albuterol use will go down     once she is back on spiriva.       4. I will give her a nebulizer machine today and prescribed DuoNeb to use as     needed.       5. Continue Singulair, Zyrtec, flonase and astelin. I have sent refills for     Zyrtec at her request. She reports having plenty of refills on the others.       6. I will repeat pulmonary function test and do an echocardiogram given her exer    tional dyspnea and her cardiac murmur. If her echocardiogram is abnormal I will     refer her back to cardiology.       7. I advised the patient that likely her smoking  and her weight are contributing    to her dyspnea. I counseled the patient weight loss by decreasing caloric intake    and gradually increasing physical activity. The patient verbalized understanding      8. I counseled the patient on smoking cessation for 5 minutes, offered nicotine     replacement therapy and  pharmacotherapy and she would like to try nicotine gum     and lozenges in addition to wellbutrin she already takes through her primary     care provider. I discussed with her that I am concerned that her lung function     will continue to decline, her respiratory symptoms will worsen and she is at     increased risk of cardiovascular disease and various cancers if she continues to    smoke.        9. Continue nightly CPAP as prescribed.       10. Follow up in 2-3 months to review test results and see how she is doing with    the addition of spiriva. She may call sooner if needed.            Patient Education      Tobacco Cessation Counseling:  for 3 - 10 minutes      Patient Education Provided:  How to use an Inhaler, How to use a Nebulizer,     Sleep Apnea, Smoking Cessation      Time Spent:  > 50% /[facility]            Electronically signed by TEAGAN PINEDO PA-C  09/09/2020 10:20       Disclaimer: Converted document may not contain table formatting or lab diagrams. Please see [facility] for the authenticated document.

## 2021-05-28 NOTE — PROGRESS NOTES
Patient: MARINO RAMIRES     Acct: [id number]     Report: #[id number]  UNIT #: [id number]     : 1975    Encounter Date:2018  PRIMARY CARE: ROCHELLE SWENSON  ***Signed***  --------------------------------------------------------------------------------------------------------------------  Chief Complaint      Encounter Date      Aug 23, 2018            Primary Care Provider      Parth Nascimento            Referring Provider      Parth Nascimento            Patient Complaint      Patient is complaining of      follow up on medication/asthma            VITALS      Height 5 ft 2 in / 157.48 cm      Weight 262 lbs 2 oz / 118.829955 kg      BSA 2.35 m2      BMI 47.9 kg/m2      Temperature 98.0 F / 36.67 C - Oral      Pulse 88      Respirations 16      Blood Pressure 124/90 Sitting, Right Arm      Pulse Oximetry 100%, room air      Exhaled Nitrous Oxide Testin            HPI      The patient is a 42 year old female with history of severe persistent asthma,     recurrent asthma exacerbations, morbid obesity, cough and exertional dyspnea.     She is here for follow up.             Since her last office visit she has been on Symbicort 160/4.5 two puffs twice     daily. She is also on Spiriva 1.25 mcg once daily and albuterol as needed. She c    ontinues to take Singulair and she has Zyrtec at home which she uses     intermittently.  She has been needing her rescue inhaler 2-3 times a week.  She     has not had significant nighttime awakenings with cough or wheezing.  She has     some shortness of breath with activities but she attributes part of it to morbid    obesity and obstructive sleep apnea. She says she has gained around 50 pounds,     has daytime sleepiness and multiple awakenings at night. She has not needed any     antibiotics or steroids since her last office visit. She has significant acid     reflux but has not been able to get her Protonix refilled. She tries to keep the    head of the bed elevated  and does not eat close to bedtime but still has     significant symptoms. She says that Dymista did help but her insurance did not     cover it and she is currently not using anything. She is also trying to lose     weight and her primary care provider has given her phentermine. Her [place]     sleepiness scale score was 9/24.            ROS      Constitutional:  Complains of: Fatigue; Denies: Fever, Weight gain, Weight loss,    Chills, Insomnia, Other      Respiratory/Breathing:  Complains of: Shortness of air, Wheezing, Cough; Denies:    Hemoptysis, Pleuritic pain, Other      Endocrine:  Denies: Polydipsia, Polyuria, Heat/cold intolerance, Abnorml     menstrual pattern, Diabetes, Other      Eyes:  Denies: Blurred vision, Vision Changes, Other      Ears, nose, mouth, throat:  Denies: Mouth lesions, Thrush, Throat pain,     Hoarseness, Allergies/Hay Fever, Post Nasal Drip, Headaches, Recent Head Injury,    Nose Bleeding, Neck Stiffness, Thyroid Mass, Hearing Loss, Ear Fullness, Dry     Mouth, Nasal or Sinus Pain, Dry Lips, Nasal discharge, Nasal congestion, Other      Cardiovascular:  Denies: Palpitations, Syncope, Claudication, Chest Pain, Wake     up Gasping for air, Leg Swelling, Irregular Heart Rate, Cyanosis, Dyspnea on     Exertion, Other      Gastrointestinal:  Denies: Nausea, Constipation, Diarrhea, Abdominal pain,     Vomiting, Difficulty Swallowing, Reflux/Heartburn, Dysphagia, Jaundice,     Bloating, Melena, Bloody stools, Other      Genitourinary:  Denies: Urinary frequency, Incontinence, Hematuria, Urgency,     Nocturia, Dysuria, Testicular problems, Other      Musculoskeletal:  Denies: Joint Pain, Joint Stiffness, Joint Swelling, Myalgias,    Other      Hematologic/lymphatic:  DENIES: Lymphadenopathy, Bruising, Bleeding tendencies,     Other      Neurological:  Denies: Headache, Numbness, Weakness, Seizures, Other      Psychiatric:  Denies: Anxiety, Appropriate Effect, Depression, Other      Sleep:   No: Excessive daytime sleep, Morning Headache?, Snoring, Insomnia?, Stop    breathing at sleep?, Other      Integumentary:  Denies: Rash, Dry skin, Skin Warm to Touch, Other      Immunologic/Allergic:  Denies: Latex allergy, Seasonal allergies, Asthma,     Urticaria, Eczema, Other      Immunization status:  No: Up to date            FAMILY/SOCIAL/MEDICAL HX      Current History      She is unemployed. She was working in factories, worked in [employer], and     multiple other places.        She has filed for disability. She lives with her son. Has 2 kids, one in     college.      Exsmoker- she started smoking when she was 23, smoked a pack a day for 13 years,    13 pack year history. She had second hand smoking exposure in past from her     family, not now.      No alcohol, illicit drugs.      Surgical History:  Yes: Oral Surgery (WISDOM TEETH CUT OUT ), Orthopedic     Surgery (RIGHT TENDON REPAIR 2015), Other Surgeries (2 cesserian section,     uterine ablation and tubal ligation); No: AAA Repair, Abdominal Surgery,     Angioplasty, Appendectomy, Back Surgery, Bladder Surgery, Bowel Surgery, Breast     Surgery, CABG, Carotid Stenosis, Cholecystectomy, Ear Surgery, Eye Surgery, Head    Surgery, Hernia Surgery, Kidney Surgery, Nose Surgery, Prostatectomy, Rectal     Surgery, Spinal Surgery, Testicular Surgery, Throat Surgery, Valve Replacement,     Vascular Surgery      Other Family Medical History:  Brother (Asthma), Sister (Asthma)      Is Father Still Living?:  Yes (COPD, CHF)      Is Mother Still Living?:  Yes (Asthma, COPD, HTN, Hypercholesterolemia)       Family History:  Yes      Social History:  No Tobacco Use, No Alcohol Use, No Recreational Drug use      Smoking status:  Former smoker (13 years / 2 ppd)      Smoking history:  10-25 pack years       Section:  Yes (, )      Tubal Ligation:  Yes      Medical History:  Yes: Allergies, Arthritis (knees, neck pain), Asthma, Blood      Disease, Chemical Dependency, Chronic Bronchitis/COPD (intabated 2012/coma),     Depression, Anxiety, Bipolar Disorder (DX JAN 2007 BY [facility]),     Hemorrhoids/Rectal Prob (ibs, gerd), High Blood Pressure, Migrane Headaches,     Shortness Of Breath (TAKES MEDICATION), Thyroid Problem, Miscellaneous     Medical/oth (coughing syncopal episode); No: Alcoholism, Anemia, Broken Bones,     Cataracts, Chemotherapy/Cancer, Emphysema, Chronic Liver Disease, Colon Trouble,    Colitis, Diverticulitis, Congestive Heart Failu, Deafness or Ringing Ears,     Convulsions, Diabetes ([facility]. takes metformin for polycystic     ovarian syndrome), Epilepsy, Seizures, Forgetfullness, Glaucoma, Gall Stones,     Gout, Head Injury, Heart Attack, Heart Murmur, Hepatitis, Hiatal Hernia, High     Cholesterol, HIV (Do not ask - volu, Jaundice, Kidney or Bladder Disease, Kidney    Stones, Mitral Valve Prolapse, Night sweats, Phlebitis, Psychiatric Care, Reflux    Disease, Rheumatic Fever, Sexually Transmitted Dis, Sinus Trouble, Skin     Disease/Psoriais/Ecz, Stroke, Tuberculosis or Pos TB Te      Psychiatric History      depression and anxiety            PREVENTION      Hx Influenza Vaccination:  Yes      Date Influenza Vaccine Given:  Sep 1, 2017      Influenza Vaccine Declined:  No      2 or More Falls Past Year?:  No      Fall Past Year with Injury?:  No      Hx Pneumococcal Vaccination:  Yes      Encouraged to follow-up with:  PCP regarding preventative exams.      Chart initiated by      ra albarado ma            ALLERGIES/MEDICATIONS      Allergies:        Coded Allergies:             PROPOXYPHENE NAPSYLATE (Verified  Allergy, Severe, NAUSEA VOMITING,     8/23/18)           VANCOMYCIN (Verified  Allergy, Unknown, RASH , 8/23/18)           LISINOPRIL (Unverified  Adverse Reaction, Unknown, NAUSEA AND DIZZY, 8/23/1    8)      Medications    Last Reconciled on 8/23/18 08:22 by MARGARITA ALVARADO MD      Pantoprazole  (Protonix*) 20 Mg Tablet      40 MG PO BIDAC, #120 TAB 3 Refills         Prov: Torsten Christensen         8/23/18       MDI-Albuterol (Ventolin HFA*) 8 Gm Hfa.aer.ad      2 PUFFS INH Q4-6H PRN for DYSPNEA, #1 INH 9 Refills         Prov: Torsten Christensen         8/23/18       Montelukast Sodium (Singulair*) 10 Mg Tab      10 MG PO HS, #90 TAB 3 Refills         Prov: Torsten Christensen         8/23/18       Tiotropium Bromide (Spiriva Respimat 1.25 mcg/puff) 4 Gm Mist.inhal      2 PUFFS INH RTQDAY, #1 INH 9 Refills         Prov: Torsten Christensen         8/23/18       Budesonide/Formoterol Fumarate (Symbicort 160/4.5 Mcg) 10.2 Gm Inh      2 PUFF INH BID, #1 INH 9 Refills         Prov: Torsten Christensen         8/23/18       MDI-Albuterol (Ventolin HFA*) 8 Gm Hfa.aer.ad      2 PUFFS INH Q4-6H PRN for DYSPNEA, #1 INH 6 Refills         Prov: Torsten Christensen         5/23/18       Montelukast Sodium (Singulair*) 10 Mg Tab      10 MG PO QDAY, #30 TAB 1 Refill         Prov: Torsten Christensen         5/23/18       Cetirizine Hcl (ZyrTEC*) 10 Mg Tablet      10 MG PO QDAY, #30 TAB 6 Refills         Prov: Torsten Christensen         5/23/18       Tiotropium Bromide (Spiriva Respimat 1.25 mcg/puff) 4 Gm Mist.inhal      2 PUFFS INH RTQDAY, #1 INH 6 Refills         Prov: Torsten Christensen         5/23/18       Pantoprazole (Protonix*) 20 Mg Tablet.dr      40 MG PO BID, #60 TAB 6 Refills         Prov: Torsten Christensen         9/12/17       Vortioxetine Hydrobromide (TRINTELLIX) 5 Mg Tablet      5 MG PO HS, #30 TAB 0 Refills         Reported         9/12/17       amLODIPine (amLODIPine) 10 Mg Tablet      10 MG PO QDAY, #30 TAB 0 Refills         Reported         11/23/15       Ergocalciferol (Vitamin D2*) 50,000 Units Capsule      20750 UNITS PO We, #8 CAP 0 Refills         Reported         9/18/15       buPROPion HCl XL (Wellbutrin XL) 300 Mg Tab.sr.24h      300 MG PO QDAY, TAB         Reported         12/31/14      Current Medications      Current Medications Reviewed 8/23/18            EXAM       Vital Signs Reviewed.      General:  Mild respiratory distress.  Normal conversant.  Has a little bit of     dyspnea.        HEENT: PERRL, EOMI.  OP, nares clear, no sinus tenderness.      Neck: Supple, no JVD, no thyromegaly.      Lymph: No axillary, cervical, supraclavicular lymphadenopathy noted bilaterally.      Chest: Bilateral diminished breath sounds, resonate to percussion bilaterally.      No wheezes or rhonchi appreciated.        CV: RRR, no MGR, pulses 2+, equal.        Abd: Soft, NT, ND, +BS, no HSM.      EXT: No clubbing, no cyanosis, no edema, no joint tenderness.        Neuro:  A  Skin: No rashes or lesions.      Vtials      Vitals:             Height 5 ft 2 in / 157.48 cm           Weight 262 lbs 2 oz / 118.573550 kg           BSA 2.35 m2           BMI 47.9 kg/m2           Temperature 98.0 F / 36.67 C - Oral           Pulse 88           Respirations 16           Blood Pressure 124/90 Sitting, Right Arm           Pulse Oximetry 100%, room air            REVIEW      Results Reviewed      PCCS Results Reviewed?:  Yes Prev Lab Results, Yes Prev Radiology Results, Yes     [facility]      Lab Results      The patient's blood work from 18 was reviewed. It showed normal CBC with     normal eosinophil count, normal coagulants and normal renal profile.      Radiographic Results               [facility]                PACS RADIOLOGY REPORT            Patient: MARINO RAMIRES   Acct: #[id number]   Report: #[id number]            UNIT #: [id number]    DOS: 18 1927      INSURANCE:[place], [place]   ORDER #:[id number]      LOCATION:ER     : 1975            PROVIDERS      ADMITTING:     ATTENDING:       FAMILY:  Parth Nascimento   ORDERING:  ARMANDO FERNANDEZ         OTHER:    DICTATING:  Mg Reeves MD            REQ #:[id number]   EXAM:CXR1 - CHEST 1 View AP PA      REASON FOR EXAM:  Chest Pain      REASON FOR VISIT:  PAIN IN  CHEST,NECK, BACK AND ARM            *******Signed******         PROCEDURE:   CHEST AP/PA SINGLE VIEW             COMPARISON:   [facility], , CHEST AP/PA 1 VIEW, 4/19/2018,     11:19.             INDICATIONS:   chest pains, short of air, today             FINDINGS:         The heart is normal in size.  The lungs are well-expanded and free of     infiltrates.             Bony structures appear intact.             CONCLUSION:   No active disease is seen.              RYAN CONDE MD             Electronically Signed and Approved By: RYAN CONDE MD on 8/02/2018 at 20:10                           Until signed, this is an unconfirmed preliminary report that may contain      errors and is subject to change.                                              COUST:      D:08/02/18 2010      PFT Results      [id number]  [id number]                                       [facility]                          [employer]                            [place], [place]  [address]               __________________________________________________________________________             Patient Name:                   Attending Physician:      Jaycee Le M.D.             Patient Visit # MR #            Admit Date  Disch Date     Location      [id number]    [id number]      08/16/2017                 [facility] -             Date of Birth      1975      __________________________________________________________________________      0821 - DIAGNOSTIC REPORT             PULMONARY FUNCTION TEST             SPIROMETRY:      Spirometry is normal.      FEV1/FVC is 76%.      FEV1 is 2.30 L, 99% of predicted.      FVC is 3.02 L, 107% of predicted.             BRONCHODILATOR RESPONSE:      There is no significant response to bronchodilator administration.             LUNG VOLUMES:      Lung volumes show hyperinflation and air trapping.       Total lung capacity is 5.65 L, 138% of predicted.      Residual volume is 2.60 L, 195% of predicted.             DIFFUSION:      Diffusion capacity is normal, 93% of predicted.             FLOW VOLUME LOOP:      Flow volume loop is normal.             CONCLUSION:      Normal spirometry with normal diffusion capacity and hyperinflation and air      trapping.  It could suggest early obstructive airway disease.  Patient with      clinical history of asthma.  Hyperinflation and air trapping is likely      related to mild asthma.  Please correlate clinically.             To be electronically signed in [employer]      [id number] TORSTEN CHRISTENSEN M.D.             NK:rt      D:  08/18/2017 16:02      T:  08/18/2017 17:20      #[id number]             Until signed, this is an unconfirmed preliminary report that may contain      errors and is subject to change.                   08/18/17 2211  <Electronically signed by Torsten Christensen MD>            Assessment      Sleep apnea         Obstructive sleep apnea syndrome - G47.33         Sleep apnea type: obstructive            Notes      New Medications      * Budesonide/Formoterol Fumarate (Symbicort 160/4.5 Mcg) 10.2 GM INH: 2 PUFF INH      BID #1      * TIOTROPIUM BROMIDE (Spiriva Respimat 1.25 mcg/puff) 4 GM MIST.INHAL: 2 PUFFS       INH RTQDAY #1      * Montelukast Sodium (Singulair*) 10 MG TAB: 10 MG PO HS #90      * MDI-Albuterol (Ventolin HFA*) 8 GM HFA.AER.AD: 2 PUFFS INH Q4-6H PRN DYSPNEA       #1      * PANTOPRAZOLE (Protonix*) 20 MG TABLET: 40 MG PO BIDAC #120         Instructions: Take on an empty stomach.      * Fluticasone/Salmeterol 230/21 (Advair /21 MCG) 12 GM HFA.AER.AD: 2 PUFF      INH RTBID 30 Days      * Pantoprazole (Protonix*) 40 MG TABLET.DR: 40 MG PO BIDAC #60         Instructions: Take on an empty stomach.      Discontinued Medications      * Jeremias-Fluticasone (Fluticasone 50 mcg) 16 GM SPRAY.SUSP: 2 PUFFS NARE EACH QDAY       #1      * PANTOPRAZOLE (Protonix*) 20  MG TABLET.DR: 40 MG PO BID #60      * predniSONE* 20 MG TABLET: 40 MG PO QDAY #10      * Azithromycin 250 MG TABLET: 250 MG PO ASDIR #6         Instructions: Take 500 mg (two tablets) on the first day, then 250 mg (one        tablet) once a day until all gone.      * Fluticasone/Vilanterol 200-25 Mcg Inh (Breo Ellipta 200-25 Mcg Inh) 1 EACH       BLST.W.DEV: 1 PUFF INH QDAY 30 Days #1      New Diagnostics      * Basic Sleep Study, 2 Week         Dx: Sleep apnea - G47.30      PLAN:        The patient is a 42 year old female with a history of severe persistent asthma     who had recurrent asthma exacerbation in the past. She also has morbid obesity,     cough and exertional dyspnea as well as gastrointestinal esophageal reflux     disease, postnasal drip, reflux and likely obstructive sleep apnea.             1.  Asthma. Continue with Symbicort 160/4.5 two puffs twice daily, Spiriva once     daily and albuterol as needed.  I advised her that her symptoms may get worse as    the seasons change.        2. Allergic rhinitis and postnasal drip. Continue with Singulair once daily.  We    gave her a sample of Dymista to use for now. I advised her to use Zyrtec     whenever she has worsening symptoms.       3. Gastrointestinal esophageal reflux disease. I will prescribe Protonix 40 mg     twice daily.       4.  Daytime hypersomnolence and sleepiness with disturbed sleep. I discussed     with her regarding a sleep study and she is willing to go for it. Weight loss     counseling was done. He is currently on phentermine through her primary care     provider . Sleep hygiene was discussed in detail.  I have ordered a sleep study     and advised her to call the office 2 weeks after the sleep study.       5.  I will follow up with her in 2 months, earlier if needed.            Patient Education      Education resources provided:  Yes      Patient Education Provided:  Acute Asthma, Acute Bronchitis, Sleep Apnea                  Disclaimer: Converted document may not contain table formatting or lab diagrams. Please see [employer] for the authenticated document.

## 2021-05-28 NOTE — PROGRESS NOTES
Patient: MARINO RAMIRES     Acct: [id number]     Report: #[id number]  UNIT #: [id number]     : 1975    Encounter Date:2018  PRIMARY CARE: ROCHELLE SWENSON  ***Signed***  --------------------------------------------------------------------------------------------------------------------  Chief Complaint      Encounter Date      2018            Referring Provider      Parth Nascimento            Patient Complaint      Patient is complaining of      follow up            VITALS      Height 5 ft 2 in / 157.48 cm      Weight 263 lbs 0 oz / 119.065992 kg      BSA 2.36 m2      BMI 48.1 kg/m2      Temperature 98.4 F / 36.89 C - Oral      Pulse 88      Respirations 16      Blood Pressure 114/76 Sitting, Right Arm      Pulse Oximetry 100%, room air            HPI      The patient is a 42 year old female with a history of severe persistent asthma     in the past.  Her symptoms were much improved once she got rid of the cats and     lots of the symptoms improved when she moved to a different place to stay.      Overall, she was on a LABA/LAMA combination, but she was on Breo and it did not     help her much.  She was having significant cough, shortness of breath and was     having flu like symptoms. 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  She was seen by Dr. Steen two weeks ago and was started on Tamiflu,     Levofloxacin and prednisone x7 days. She has finished the treatment her cough     and shortness of breath is better. She still has some wheezing, has needed     rescue inhaler 2-3 times a day.  She uses ProAir, tries to restrict it, but     overall has needed. She has nocturnal awakenings with cough and wheezing at     times.  She has no fever, chills, nausea or vomiting.  Her symptoms are     somewhat improved. She has overall been feeling some better.            ROS      Constitutional:  Complains of: Fatigue, Denies: Fever, Weight gain, Weight loss    , Chills, Insomnia, Other       Respiratory/Breathing:  Complains of: Shortness of air, Wheezing, Cough, Denies    : Hemoptysis, Pleuritic pain, Other      Endocrine:  Denies: Polydipsia, Polyuria, Heat/cold intolerance, Abnorml     menstrual pattern, Diabetes, Other      Eyes:  Denies: Blurred vision, Vision Changes, Other      Ears, nose, mouth, throat:  Denies: Mouth lesions, Thrush, Throat pain,     Hoarseness, Allergies/Hay Fever, Post Nasal Drip, Headaches, Recent Head Injury    , Nose Bleeding, Neck Stiffness, Thyroid Mass, Hearing Loss, Ear Fullness, Dry     Mouth, Nasal or Sinus Pain, Dry Lips, Nasal discharge, Nasal congestion, Other      Cardiovascular:  Denies: Palpitations, Syncope, Claudication, Chest Pain, Wake     up Gasping for air, Leg Swelling, Irregular Heart Rate, Cyanosis, Dyspnea on     Exertion, Other      Gastrointestinal:  Denies: Nausea, Constipation, Diarrhea, Abdominal pain,     Vomiting, Difficulty Swallowing, Reflux/Heartburn, Dysphagia, Jaundice, Bloating    , Melena, Bloody stools, Other      Genitourinary:  Denies: Urinary frequency, Incontinence, Hematuria, Urgency,     Nocturia, Dysuria, Testicular problems, Other      Hematologic/lymphatic:  DENIES: Lymphadenopathy, Bruising, Bleeding tendencies,     Other      Neurological:  Denies: Headache, Numbness, Weakness, Seizures, Other      Psychiatric:  Denies: Anxiety, Appropriate Effect, Depression, Other      Sleep:  No: Excessive daytime sleep, Morning Headache?, Snoring, Insomnia?,     Stop breathing at sleep?, Other      Integumentary:  Denies: Rash, Dry skin, Skin Warm to Touch, Other      Immunologic/Allergic:  Denies: Latex allergy, Seasonal allergies, Asthma,     Urticaria, Eczema, Other      Immunization status:  No: Up to date            FAMILY/SOCIAL/MEDICAL HX      Current History      She is unemployed. She was working in factories, worked in [employer], and     multiple other places.        She has filed for disability. She lives with her son.  Has 2 kids, one in     college.      Exsmoker- she started smoking when she was 23, smoked a pack a day for 13 years    , 13 pack year history. She had second hand smoking exposure in past from her     family, not now.      No alcohol, illicit drugs.      Surgical History:  Yes: Oral Surgery (WISDOM TEETH CUT OUT ), Orthopedic     Surgery (RIGHT TENDON REPAIR 2015), Other Surgeries (2 cesserian section,     uterine ablation and tubal ligation), No: AAA Repair, Abdominal Surgery,     Angioplasty, Appendectomy, Back Surgery, Bladder Surgery, Bowel Surgery, Breast     Surgery, CABG, Carotid Stenosis, Cholecystectomy, Ear Surgery, Eye Surgery,     Head Surgery, Hernia Surgery, Kidney Surgery, Nose Surgery, Prostatectomy,     Rectal Surgery, Spinal Surgery, Testicular Surgery, Throat Surgery, Valve     Replacement, Vascular Surgery      Other Family Medical History:  Brother (Asthma), Sister (Asthma)      Is Father Still Living?:  Yes (COPD, CHF)      Is Mother Still Living?:  Yes (Asthma, COPD, HTN, Hypercholesterolemia)      Social History:  No Tobacco Use, No Alcohol Use, No Recreational Drug use      Smoking status:  Former smoker (13 years / /2 ppd)      Smoking history:  10-25 pack years       Section:  Yes (, )      Tubal Ligation:  Yes      Medical History:  Yes: Allergies, Arthritis (knees, neck pain), Asthma, Blood     Disease, Chemical Dependency, Chronic Bronchitis/COPD (intabated /),     Depression, Anxiety, [facility] ([facility] Hemorrhoids/    Rectal Prob (ibs, gerd), High Blood Pressure, Migrane Headaches, Shortness Of     Breath (TAKES MEDICATION), Thyroid Problem, Miscellaneous Medical/oth (coughing     syncopal episode), No: Alcoholism, Anemia, Broken Bones, Cataracts, Chemotherapy    /Cancer, Emphysema, Chronic Liver Disease, Colon Trouble, Colitis,     Diverticulitis, Congestive Heart Failu, Deafness or Ringing Ears, Convulsions,     Diabetes  ([facility] DIAB[employer]. takes metformin for polycystic ovarian syndrome)    , Epilepsy, Seizures, Forgetfullness, Glaucoma, Gall Stones, Gout, Head Injury,     Heart Attack, Heart Murmur, Hepatitis, Hiatal Hernia, High Cholesterol, HIV (Do     not ask - volu, Jaundice, Kidney or Bladder Disease, Kidney Stones, Mitral     Valve Prolapse, Night sweats, Phlebitis, Psychiatric Care, Reflux Disease,     Rheumatic Fever, Sexually Transmitted Dis, Sinus Trouble, Skin Disease/Psoriais/    Ecz, Stroke, Tuberculosis or Pos TB Te            Hx Influenza Vaccination:  Yes      Date Influenza Vaccine Given:  Sep 1, 2017      Influenza Vaccine Declined:  No      2 or More Falls Past Year?:  No      Fall Past Year with Injury?:  No      Hx Pneumococcal Vaccination:  Yes      Encouraged to follow-up with:  PCP regarding preventative exams.      Chart initiated by      ra albarado ma            ALLERGIES/MEDICATIONS      Allergies:        Coded Allergies:             PROPOXYPHENE NAPSYLATE (Verified  Allergy, Severe, NAUSEA VOMITING, 2/9/18)           VANCOMYCIN (Verified  Allergy, Unknown, RASH , 2/9/18)           LISINOPRIL (Unverified  Adverse Reaction, Unknown, NAUSEA AND DIZZY, 2/9/18    )      Medications    Last Reconciled on 2/9/18 12:36 by TORSTEN CHRISTENSEN MD      Azithromycin (Azithromycin) 250 Mg Tablet      250 MG PO ASDIR, #6 TAB         Prov: Torsten Christensen         2/9/18       predniSONE* (predniSONE*) 20 Mg Tablet      40 MG PO QDAY, #10 TAB 2 Refills         Prov: Torsten Christensen         2/9/18       Budesonide/Formoterol Fumarate (Symbicort 160/4.5 Mcg) 10.2 Gm Inh      2 PUFF INH RTBID, #1 INH 6 Refills         Prov: Tami Giordano PA-C         1/25/18       MDI-Albuterol (Ventolin HFA*) 8 Gm Hfa.aer.ad      2 PUFFS INH Q4-6H Y for DYSPNEA, #1 INH 6 Refills         Prov: Torsten Christensen         1/11/18       Paroxetine Hcl (Paxil*) Unknown Strength Tablet      PO QDAY, TAB         Reported         1/11/18       Levothyroxine  (Synthroid) Unknown Strength Tablet      PO QDAY@07, #30 TAB 0 Refills         Reported         1/11/18       Pantoprazole (Protonix*) 20 Mg Tablet.dr      40 MG PO BID, #60 TAB 6 Refills         Prov: Torsten Christensen         9/12/17       Vortioxetine Hydrobromide (TRINTELLIX) 5 Mg Tablet      5 MG PO HS, #30 TAB 0 Refills         Reported         9/12/17       Montelukast Sodium (Singulair*) 10 Mg Tab      10 MG PO QDAY, #30 TAB 1 Refill         Prov: Torsten Christensen         3/8/17       Cetirizine Hcl (ZyrTEC*) 10 Mg Tablet      10 MG PO QDAY, #30 TAB 6 Refills         Prov: Torsten Christensen         3/8/17       Jeremias-Fluticasone (Fluticasone 50 mcg) 16 Gm Spray.susp      2 PUFFS NARE EACH QDAY, #1 BOTTLE 6 Refills         Prov: Torsten Christensen         10/21/16       amLODIPine (amLODIPine) 10 Mg Tablet      10 MG PO QDAY, #30 TAB 0 Refills         Reported         11/23/15       Ergocalciferol (Vitamin D2*) 50,000 Units Capsule      50433 UNITS PO We, #8 CAP 0 Refills         Reported         9/18/15       buPROPion HCl XL (Wellbutrin XL) 300 Mg Tab.sr.24h      300 MG PO QDAY, TAB         Reported         12/31/14       Levothyroxine (Synthroid) 0.025 Mg Tablet      0.025 MG PO QDAY@07, #30 TAB 0 Refills         Reported         10/8/14      Current Medications      Current Medications Reviewed 2/9/18            EXAM      Vital Signs Reviewed.      General:  Mild respiratory distress.  Normal conversant.  Has a little bit of     dyspnea.        HEENT: PERRL, EOMI.  OP, nares clear, no sinus tenderness.      Neck: Supple, no JVD, no thyromegaly.      Lymph: No axillary, cervical, supraclavicular lymphadenopathy noted bilaterally.      Chest: Lungs mildly decreased throughout with slightly coarse breath sounds     present.  No wheezes or rhonchi appreciated.        CV: RRR, no MGR, pulses 2+, equal.        Abd: Soft, NT, ND, +BS, no HSM.      EXT: No clubbing, no cyanosis, no edema, no joint tenderness.        Neuro:  A  Skin: No  rashes or lesions.      Vtials      Vitals:             Height 5 ft 2 in / 157.48 cm           Weight 263 lbs 0 oz / 119.170284 kg           BSA 2.36 m2           BMI 48.1 kg/m2           Temperature 98.4 F / 36.89 C - Oral           Pulse 88           Respirations 16           Blood Pressure 114/76 Sitting, Right Arm           Pulse Oximetry 100%, room air            REVIEW      Results Reviewed      PCCS Results Reviewed?:  Yes Prev Lab Results, Yes Prev Radiology Results, Yes     Previous [facility] Records      Lab Results      The patient's previous blood work showed a serum IgE level of 18, CBC with high     platelet count.      Radiographic Results               [facility]                PACS RADIOLOGY REPORT            Patient: MARINO RAMIRES   Acct: #[id number]   Report: #[id number]            UNIT #: [id number]    DOS: 17 0117      INSURANCE:[employer]   ORDER #:[id number]      LOCATION:ER     : 1975            PROVIDERS      ADMITTING:     ATTENDING:       FAMILY:  MONY JACKSON   ORDERING:  VIKAS GARCIA         OTHER:    DICTATING:  Momo Bender MD, JR            REQ #:[id number]    CPT CODE:54443   EXAM:CXR1 - CHEST 1 View AP PA      REASON FOR EXAM:  Chest Pain      REASON FOR VISIT:  BI LAT FEET/LEG SWELLING            *******Signed******         PROCEDURE:   CHEST AP/PA SINGLE VIEW             COMPARISON:   [facility], , CHEST AP/PA 1 VIEW, 2015, 0:    40.             INDICATIONS:   CHEST PAIN, BILATERAL FEET SWELLING.             FINDINGS:   A single frontal chest radiograph reveals no cardiac enlargement     and no acute infiltrate.       No pneumothorax is seen.             CONCLUSION:   No acute cardiopulmonary disease is seen radiographically.             MOMO BENDER JR, MD             Electronically Signed and Approved By: MOMO BENDER JR, MD on 2017 at 2:    12                         Until signed, this is an unconfirmed preliminary report that may contain      errors and is subject to change.                                              TIANA:      D:11/28/17 0212      PFT Results      [id number]  [id number]                                       [facility]                          [employer]                            [facility]  [address]               __________________________________________________________________________             Patient Name:                   Attending Physician:      Jaycee Le M.D.             Patient Visit # MR #            Admit Date  Disch Date     Location      [id number]    [id number]      08/16/2017                 [facility] -             Date of Birth      1975      __________________________________________________________________________      0821 - DIAGNOSTIC REPORT             PULMONARY FUNCTION TEST             SPIROMETRY:      Spirometry is normal.      FEV1/FVC is 76%.      FEV1 is 2.30 L, 99% of predicted.      FVC is 3.02 L, 107% of predicted.             BRONCHODILATOR RESPONSE:      There is no significant response to bronchodilator administration.             LUNG VOLUMES:      Lung volumes show hyperinflation and air trapping.      Total lung capacity is 5.65 L, 138% of predicted.      Residual volume is 2.60 L, 195% of predicted.             DIFFUSION:      Diffusion capacity is normal, 93% of predicted.             FLOW VOLUME LOOP:      Flow volume loop is normal.             CONCLUSION:      Normal spirometry with normal diffusion capacity and hyperinflation and air      trapping.  It could suggest early obstructive airway disease.  Patient with      clinical history of asthma.  Hyperinflation and air trapping is likely      related to mild asthma.  Please correlate clinically.             To be electronically signed in [employer]       [id number] TORSTEN CHRISTENSEN M.D.             NK:rt      D:  08/18/2017 16:02      T:  08/18/2017 17:20      #[id number]             Until signed, this is an unconfirmed preliminary report that may contain      errors and is subject to change.                   08/18/17 2211  <Electronically signed by Torsten Christensen MD>            PLAN      Assessment      Notes      New Medications      * predniSONE* 20 MG TABLET: 40 MG PO QDAY #10      * Azithromycin 250 MG TABLET: 250 MG PO ASDIR #6       Instructions: Take 500 mg (two tablets) on the first day, then 250 mg (one     tablet) once a day until all gone.      Discontinued Medications      * Oseltamivir Phosphate (Tamiflu*) 75 MG CAPSULE: 75 MG PO BID 5 Days #10       Dx: Asthma - J45.909      * predniSONE* (Deltasone*) 50 MG TAB: 50 MG PO QDAY 7 Days #7       Dx: Asthma - J45.909      * Levofloxacin (Levaquin*) 500 MG TABLET: 500 MG PO QDAY 7 Days #7       Dx: Asthma - J45.909      PLAN:  The patient is a 42 year old female with a history of severe persistent     asthma who had an acute asthma exacerbation likely related to influenza.  She     also has morbid obesity, cough and exertional dyspnea.            1.  Asthma, severe persistent with recent acute exacerbation.  She has finished     antibiotics, steroids and Tamiflu.  I will continue with Symbicort two puffs     twice a day.  Because of her sudden significant symptoms, I will add Spiriva     1.25 mcg one daily and use albuterol as needed. Avoid any allergens.  She is up-    to-date on vaccinations.  Asthma exacerbation action plan was discussed in     detail. I have given her a course of prednisone and Z-Pastor if needed to keep for     now.              2.  I will follow up with her in 2-3 months, earlier if needed.            Patient Education      Education resources provided:  Yes      Patient Education Provided:  Acute Asthma, Acute Bronchitis                 Disclaimer: Converted document may not contain table  formatting or lab diagrams. Please see [employer] for the authenticated document.

## 2021-05-28 NOTE — PROGRESS NOTES
Patient: MARINO RAMIRES     Acct: [id number]     Report: #[id number]  UNIT #: [id number]     : 1975    Encounter Date:2020  PRIMARY CARE: ROCHELLE SWENSON  ***Signed***  --------------------------------------------------------------------------------------------------------------------  Chief Complaint      Encounter Date      2020            Primary Care Provider            Monica Scott            Referring Provider      SELF,REFERRED            Patient Complaint      Patient is complaining of      asthma f/u            VITALS      Height 5 ft 2 in / 157.48 cm      Weight 267 lbs 0 oz / 121.267409 kg      BSA 2.16 m2      BMI 48.8 kg/m2      Temperature 98.4 F / 36.89 C - Temporal      Pulse 71      Respirations 18      Blood Pressure 134/82 Sitting, Right Arm      Pulse Oximetry 97%, room air            HPI      The patient is a 44 year old female with morbid obesity, obstructive sleep apnea    and severe persistent asthma who is here for follow up.  Since her last office     visit, she was on Symbicort and Spiriva and has been on rescue inhaler which she    uses 2-3 times a day. She wakes up everynight with her shortness of breath,     cough and wheezing. She does use CPAP machine and it does help with her sleep     apnea, but her breathing gets worse. LAINE Lewis had ordered pulmonary     function test and six minute walk test on last office visit as well as     echocardiogram, but she did not do it. She is wanting to have Pneumovax which     she had in .            ROS      Constitutional:  Complains of: Fatigue; Denies: Fever, Weight gain, Weight loss,    Chills, Insomnia, Other      Respiratory/Breathing:  Complains of: Shortness of air, Wheezing; Denies: Cough,    Hemoptysis, Pleuritic pain, Other      Endocrine:  Denies: Polydipsia, Polyuria, Heat/cold intolerance, Abnorml     menstrual pattern, Diabetes, Other      Eyes:  Denies: Blurred vision, Vision Changes, Other       Ears, nose, mouth, throat:  Denies: Mouth lesions, Thrush, Throat pain,     Hoarseness, Allergies/Hay Fever, Post Nasal Drip, Headaches, Recent Head Injury,    Nose Bleeding, Neck Stiffness, Thyroid Mass, Hearing Loss, Ear Fullness, Dry     Mouth, Nasal or Sinus Pain, Dry Lips, Nasal discharge, Nasal congestion, Other      Cardiovascular:  Denies: Palpitations, Syncope, Claudication, Chest Pain, Wake     up Gasping for air, Leg Swelling, Irregular Heart Rate, Cyanosis, Dyspnea on     Exertion, Other      Gastrointestinal:  Denies: Nausea, Constipation, Diarrhea, Abdominal pain,     Vomiting, Difficulty Swallowing, Reflux/Heartburn, Dysphagia, Jaundice, Bloatin    g, Melena, Bloody stools, Other      Genitourinary:  Denies: Urinary frequency, Incontinence, Hematuria, Urgency,     Nocturia, Dysuria, Testicular problems, Other      Musculoskeletal:  Denies: Joint Pain, Joint Stiffness, Joint Swelling, Myalgias,    Other      Hematologic/lymphatic:  DENIES: Lymphadenopathy, Bruising, Bleeding tendencies,     Other      Neurological:  Denies: Headache, Numbness, Weakness, Seizures, Other      Psychiatric:  Denies: Anxiety, Appropriate Effect, Depression, Other      Sleep:  No: Excessive daytime sleep, Morning Headache?, Snoring, Insomnia?, Stop    breathing at sleep?, Other      Integumentary:  Denies: Rash, Dry skin, Skin Warm to Touch, Other      Immunologic/Allergic:  Denies: Latex allergy, Seasonal allergies, Asthma,     Urticaria, Eczema, Other      Immunization status:  No: Up to date            FAMILY/SOCIAL/MEDICAL HX      Surgical History:  Yes: Bowel Surgery (COLONOSCOPY), Oral Surgery (WISDOM TEETH     CUT OUT 1996), Orthopedic Surgery (RIGHT ACHILLES TENDON REPAIR SEPT 2015, RIGHT    ACHILLES I  ligation); No: AAA Repair, Abdominal Surgery, Angioplasty, Appendectomy, Back     Surgery, Bladder Surgery, Breast Surgery, CABG, Carotid Stenosis,     Cholecystectomy, Ear Surgery, Eye Surgery, Head Surgery, Hernia  Surgery, Kidney     Surgery, Nose Surgery, Prostatectomy, Rectal Surgery, Spinal Surgery, Testicular    Surgery, Throat Surgery, Valve Replacement, Vascular Surgery      Other Family Medical History:  Brother, Sister      Is Father Still Living?:  No      Is Mother Still Living?:  No       Family History:  Yes      Social History:  Tobacco Use; No Alcohol Use, No Recreational Drug use      Smoking status:  Current every day smoker (22yo, .5ppd)       Section:  Yes (, )      Tubal Ligation:  Yes      Anticoagulation Therapy:  No      Medical History:  Yes: Arthritis (knees, neck), Asthma (INHALER ), Chemical     Dependency, Chronic Bronchitis/COPD (INHALER), Depression, Anxiety, Bipolar     Disorder (DX 2007 BY [employer]), Hemorrhoids/Rectal Prob (GERD, IBS), High    Blood Pressure (ON MED), Migrane Headaches, Shortness Of Breath, Thyroid     Problem; No: Anemia, Blood Disease, Broken Bones, Cataracts, Chemotherap    y/Cancer, Emphysema, Chronic Liver Disease, Colon Trouble, Colitis,     Diverticulitis, Congestive Heart Failu, Deafness or Ringing Ears, Diabetes,     Epilepsy, Seizures, Glaucoma, Gall Stones, Gout, Head Injury, Heart Attack (HX     OF SYNCOPE CARDIAC WORK UP ), Heart Murmur, Hepatitis, Hiatal Hernia, HIV (Do     not ask - volu, Kidney or Bladder Disease, Kidney Stones, Mitral Valve Prolapse,    Psychiatric Care, Reflux Disease, Rheumatic Fever, Sexually Transmitted Dis,     Sinus Trouble, Skin Disease/Psoriais/Ecz, Stroke, Tuberculosis or Pos TB Te,     Miscellaneous Medical/oth      Psychiatric History      none            PREVENTION      Hx Influenza Vaccination:  Yes      Date Influenza Vaccine Given:  Oct 1, 2020      Influenza Vaccine Declined:  No      2 or More Falls in Past Year?:  No      Fall Past Year with Injury?:  No      Hx Pneumococcal Vaccination:  Yes      Encouraged to follow-up with:  PCP regarding preventative exams.      Chart initiated by      ra albarado  ma            ALLERGIES/MEDICATIONS      Allergies:        Coded Allergies:             VANCOMYCIN (Verified  Allergy, Severe, RASH, RENAL FAILURE, 11/24/20)           LISINOPRIL (Unverified  Adverse Reaction, Unknown, NAUSEA AND DIZZY,     11/24/20)           PROPOXYPHENE NAPSYLATE (Verified  Adverse Reaction, Unknown, NAUSEA     VOMITING, ITCH, 11/24/20)      Medications    Last Reconciled on 11/24/20 10:54 by TORSTEN CHRISTENSEN MD      Beclomethasone Dipropionate (Qvar 80 Redihaler 10.6 GM) 10.6 Gm Hfa.aeroba      1 PUFF INH RTBID, #1 INH 9 Refills         Prov: Torsten Christensen         11/24/20       Fluticasone/Umeclidin/Vilanter (Trelegy Ellipta 100-62.5-25) 1 Each Blst.w.dev      1 PUFF INH RTQDAY, #1 INH 9 Refills         Prov: Torsten Christensen         11/24/20       predniSONE (predniSONE) 20 Mg Tablet      40 MG PO QDAY, #10 TAB 2 Refills         Prov: Torsten Christensen         11/24/20       Cariprazine Hydrochloride (Vraylar) 6 Mg Capsule      6 MG PO QDAY, #30 CAP 0 Refills         Reported         11/24/20       MDI-Albuterol (Ventolin HFA) 8 Gm Hfa.aer.ad      2 PUFFS INH RTTID, #1 MDI 0 Refills         Reported         11/24/20       MDI-Albuterol (Ventolin HFA) 8 Gm Hfa.aer.ad      2 PUFFS INH RTTID for 30 Days, #1 MDI 3 Refills         Prov: Torsten Christensen         10/30/20       Nicotine Polacrilex (Nicotine) 2 Mg Lozenge      2 MG BUCCAL Q4-6H, #180 DENITA 3 Refills         Prov: TEAGAN PINEDO PA-C         9/8/20       Nicotine Polacrilex (Nicorette) 2 Mg Gum      2 MG PO Q4-6H, #180 BOX 2 Refills         Prov: TEAGAN PINEDO PA-C         9/8/20       Albuterol/Ipratropium (Duoneb) 3 Ml Ampul.neb      3 ML INH Q4H PRN for SHORTNESS OF BREATH, #120 NEB 5 Refills         Prov: TEAGAN PINEDO PA-C         9/8/20       Tiotropium Bromide (Spiriva Respimat 2.5 mcg/Puff) 4 Gm Mist.inhal      2 PUFFS INH RTQDAY, #1 MDI 11 Refills         Prov: TEAGAN PINEDO PA-C         9/8/20       Cetirizine Hcl (zyrTEC) 10 Mg  Tablet      10 MG PO QDAY, #30 TAB 11 Refills         Prov: TEAGAN PINEDO PA-C         9/8/20       hydrOXYzine HCL (hydrOXYzine HCL) 25 Mg Tablet      25 MG PO QID, #120 TAB 0 Refills         Reported         9/8/20       Omega-3/Dha/Epa/Fish Oil (Fish Oil 500 Mg) 1 Each Capsule.      500 MG PO HS, #30 CAP 0 Refills         Reported         9/8/20       Multivitamins (Multi-Vitamin) 1 Each Tablet      1 TAB PO QDAY, #30 TAB 0 Refills         Reported         9/8/20       Jeremias-Fluticasone (Fluticasone 50 mcg) 16 Gm Spray.susp      1 PUFFS NARE EACH BID, #1 BOTTLE 0 Refills         Reported         9/8/20       Azelastine Hcl (Azelastine Nasal) 137 Mcg/0.137 Ml Spray.pump      1 PUFFS NARE EACH BID, #1 BOTTLE         Reported         9/8/20       Ferrous Sulfate (Iron Sulfate*) 325 Mg Tablet      325 MG PO QDAY, #30 TAB 0 Refills         Reported         9/8/20       metFORMIN ER (Glucophage XR) 500 Mg Tab.er.24h      500 MG PO BID, #60 TAB.ER 0 Refills         Reported         9/8/20       Montelukast Sodium (Singulair*) 10 Mg Tab      10 MG PO HS, #90 TAB 4 Refills         Prov: TEAGAN PINEDO PA-C         8/20/20       Pantoprazole (Protonix) 20 Mg Tablet.      20 MG PO QDAY, #30 TAB 0 Refills         Prov: LEVAR STEPHENS         7/15/20       Aspirin EC (Aspirin EC) 81 Mg Tablet.      81 MG PO QDAY, #30 TAB.EC 0 Refills         Reported         7/13/20       Gemfibrozil (Gemfibrozil*) 600 Mg Tablet      600 MG PO BID, TAB         Reported         7/13/20       Linaclotide (Linzess) 145 Mcg Capsule      145 MCG PO QDAY, CAP         Reported         7/13/20       Mirtazapine (Remeron) 15 Mg Tablet      7.5 MG PO HS, #15 TAB 0 Refills         Reported         7/13/20       Sertraline HCl (Sertraline*) 100 Mg Tablet      100 MG PO QDAY, #30 TAB 0 Refills         Reported         7/13/20       Levothyroxine Sodium (Levoxyl*) 0.112 Mg Tablet      0.112 MG PO QDAY@07, #30 TAB 0 Refills         Reported          20       amLODIPine (amLODIPine) 10 Mg Tablet      10 MG PO QDAY, #30 TAB 0 Refills         Reported         11/23/15      Current Medications      Current Medications Reviewed 20            EXAM      Vital Signs Reviewed.      General:  Mild respiratory distress.  Normal conversant.  Has a little bit of     dyspnea.        HEENT: PERRL, EOMI.  OP, nares clear, no sinus tenderness.      Neck: Supple, no JVD, no thyromegaly.      Lymph: No axillary, cervical, supraclavicular lymphadenopathy noted bilaterally.      Chest: Bilateral diminished breath sounds, no wheezes, crackles or rhonchi,     resonant to percussion bilaterally.        CV: RRR, no MGR, pulses 2+, equal.        Abd: Soft, NT, ND, +BS, no HSM.      EXT: Trace pedal edema, nontender to palpitation.        Neuro:  A  Skin: No rashes or lesions.      Vtials      Vitals:             Height 5 ft 2 in / 157.48 cm           Weight 267 lbs 0 oz / 121.869074 kg           BSA 2.16 m2           BMI 48.8 kg/m2           Temperature 98.4 F / 36.89 C - Temporal           Pulse 71           Respirations 18           Blood Pressure 134/82 Sitting, Right Arm           Pulse Oximetry 97%, room air            REVIEW      Results Reviewed      PCCS Results Reviewed?:  Yes Prev Lab Results, Yes Prev Radiology Results, Yes     Previous [facility] Records      Radiographic Results               [facility]                PACS RADIOLOGY REPORT            Patient: MARINO RAMIRES   Acct: #[id number]   Report: #[id number]            UNIT #: [id number]    DOS: 18 192      INSURANCE:[place], [place]   ORDER #:[id number]      LOCATION:ER     : 1975            PROVIDERS      ADMITTING:     ATTENDING:       FAMILY:  Parth Nascimento   ORDERING:  ARMANDO FERNANDEZ         OTHER:    DICTATING:  Mg Reeves MD            REQ #:[id number]   EXAM:CXR1 - CHEST 1 View AP PA      REASON FOR EXAM:  Chest Pain       REASON FOR VISIT:  PAIN IN CHEST,NECK, BACK AND ARM            *******Signed******         PROCEDURE:   CHEST AP/PA SINGLE VIEW             COMPARISON:   [facility], , CHEST AP/PA 1 VIEW, 2018,     11:19.             INDICATIONS:   chest pains, short of air, today             FINDINGS:         The heart is normal in size.  The lungs are well-expanded and free of     infiltrates.             Bony structures appear intact.             CONCLUSION:   No active disease is seen.              RYAN CONDE MD             Electronically Signed and Approved By: RYAN CONDE MD on 2018 at 20:10                           Until signed, this is an unconfirmed preliminary report that may contain      errors and is subject to change.                                              COUST:      D:18      PFT Results      Patient: MARINO RAMIRES      Acct: #[id number]   Report: #[id number]      UNIT #: [id number]   : 1975   Encounter Date: 2020      PRIMARY CARE: ROCHELLE SWENSON      REFERRING:             Chief Complaint      Encounter Date      Sep 8, 2020            Primary Care Provider            Monica Scott            Referring Provider      SELF,REFERRED            Patient Complaint      Patient is complaining of      Pt here for fu, COPD            VITALS      Height 5 ft 2 in / 157.48 cm      Weight 266 lbs 4 oz / 120.418628 kg      BSA 2.16 m2      BMI 48.7 kg/m2      Temperature 97.5 F / 36.39 C - Temporal      Pulse 84      Respirations 14      Blood Pressure 119/75 Sitting, Left Arm      Pulse Oximetry 98%, Room air            HPI      The patient is a 44 year old  female patient of Dr. Christensen's also    known to me from a previous office visit in 2020. She has a history of     obstructive sleep apnea on nightly CPAP and has previously been very compliant     with this. She also has a history of asthma and had mild air trapping seen on      pulmonary function test from 2017. She has been doing well on Symbicort 160 at     her last office visit and also takes Singulair, Zyrtec, flonase and astelin for     seasonal allergies. The patient reports being more short of breath over the past    several months. She finds she is getting short of breath and has to stop going     up 1 flight of what she describes as very steep steps at her fiance's house.     This did not previously happen to her. She denies coughing or wheezing,     hemoptysis, fever or chills or  purulent sputum production. She feels like the     Symbicort helps but still requires albuterol 1-2 times a day every day. She     denies nocturnal awakenings, nocturnal coughing or wheezing. She is requesting a    nebulizer machine as she does not have one currently. She has used DuoNeb in her    son's machine and felt like they helped.             I reviewed the Review of Systems, medical, surgical and family history and agree    with those as entered.      Copies To:   Torsten Christensen      Constitutional:  Denies: Fatigue, Fever, Weight gain, Weight loss, Chills,     Insomnia, Other      Respiratory/Breathing:  Complains of: Shortness of air; Denies: Wheezing, Cough,    Hemoptysis, Pleuritic pain, Other      Endocrine:  Denies: Polydipsia, Polyuria, Heat/cold intolerance, Abnorml     menstrual pattern, Diabetes, Other      Eyes:  Denies: Blurred vision, Vision Changes, Other      Ears, nose, mouth, throat:  Denies: Congestion, Dysphagia, Hearing Changes, Nose    Bleeding, Nasal Discharge, Throat pain, Tinnitus, Other      Cardiovascular:  Denies: Chest Pain, Exertional dyspnea, Peripheral Edema,     Palpitations, Syncope, Wake up Gasping for air, Orthopnea, Tachycardia, Other      Gastrointestinal:  Denies: Abdominal pain/cramping, Bloody stools, Constipation,    Diarrhea, Melena, Nausea, Vomiting, Other      Genitourinary:  Denies: Dysuria, Urinary frequency, Incontinence, Hematuria,      Urgency, Other      Musculoskeletal:  Denies: Joint Pain, Joint Stiffness, Joint Swelling, Myalgias,    Other      Hematologic/lymphatic:  DENIES: Lymphadenopathy, Bruising, Bleeding tendencies,     Other      Neurologic:  Denies: Headache, Numbness, Weakness, Seizures, Other      Psychiatric:  Denies: Anxiety, Appropriate Effect, Depression, Other      Sleep:  No: Excessive daytime sleep, Morning Headache?, Snoring, Insomnia?, Stop    breathing at sleep?, Other      Integumentary:  Denies: Rash, Dry skin, Skin Warm to Touch, Other            FAMILY/SOCIAL/MEDICAL HX      Surgical History:  Yes: Bowel Surgery (COLONOSCOPY), Oral Surgery (WISDOM TEETH     CUT OUT ), Orthopedic Surgery (RIGHT ACHILLES TENDON REPAIR 2015, RIGHT    ACHILLES I  ligation); No: AAA Repair, Abdominal Surgery, Angioplasty, Appendectomy, Back Anderson    rgery, Bladder Surgery, Breast Surgery, CABG, Carotid Stenosis, Cholecystectomy,    Ear Surgery, Eye Surgery, Head Surgery, Hernia Surgery, Kidney Surgery, Nose     Surgery, Prostatectomy, Rectal Surgery, Spinal Surgery, Testicular Surgery,     Throat Surgery, Valve Replacement, Vascular Surgery      Other Family Medical History:  Brother, Sister      Is Father Still Living?:  No      Is Mother Still Living?:  No      Social History:  Tobacco Use; No Alcohol Use, No Recreational Drug use      Smoking status:  Current every day smoker (22yo, .5ppd)       Section:  Yes (1995)      Tubal Ligation:  Yes      Anticoagulation Therapy:  No      Medical History:  Yes: Arthritis (knees, neck), Asthma (INHALER ), Chemical     Dependency, Chronic Bronchitis/COPD (INHALER), Depression, Anxiety, [facility]     [facility] Hemorrhoids/Rectal Prob (GERD, IBS), High    Blood Pressure (ON MED), Migrane Headaches, Shortness Of Breath, Thyroid     Problem; No: Anemia, Blood Disease, Broken Bones, Cataracts,     Chemotherapy/Cancer, Emphysema, Chronic Liver Disease,  Colon Trouble, Colitis,     Diverticulitis, Congestive Heart Failu, Deafness or Ringing Ears, Diabetes,     Epilepsy, Seizures, Glaucoma, Gall Stones, Gout, Head Injury, Heart Attack (HX     OF SYNCOPE CARDIAC WORK UP ), Heart Murmur, Hepatitis, Hiatal Hernia, HIV (Do     not ask - volu, Kidney or Bladder Disease, Kidney Stones, Mitral Valve Prolapse,    Psychiatric Care, Reflux Disease, Rheumatic Fever, Sexually Transmitted Dis,     Sinus Trouble, Skin Disease/Psoriais/Ecz, Stroke, Tuberculosis or Pos TB Te,     Miscellaneous Medical/oth      Psychiatric History      Anxiety, depression and bipolar            PREVENTION      Hx Influenza Vaccination:  Yes      Date Influenza Vaccine Given:  Oct 1, 2019      Influenza Vaccine Declined:  No      2 or More Falls in Past Year?:  No      Fall Past Year with Injury?:  No      Hx Pneumococcal Vaccination:  Yes      Encouraged to follow-up with:  PCP regarding preventative exams.      Chart initiated by      Pascale Jackson MA            ALLERGIES/MEDICATIONS      Allergies:        Coded Allergies:             VANCOMYCIN (Verified  Allergy, Severe, RASH, RENAL FAILURE, 9/8/20)           LISINOPRIL (Unverified  Adverse Reaction, Unknown, NAUSEA AND DIZZY,     9/8/20)           PROPOXYPHENE NAPSYLATE (Verified  Adverse Reaction, Unknown, NAUSEA     VOMITING, ITCH, 9/8/20)      Medications    Last Reconciled on 9/8/20 11:45 by LAINE BAILEY      hydrOXYzine HCL (hydrOXYzine HCL) 25 Mg Tablet      25 MG PO QID, #120 TAB 0 Refills         Reported         9/8/20       Omega-3/Dha/Epa/Fish Oil (Fish Oil 500 Mg) 1 Each Capsule.dr      500 MG PO HS, #30 CAP 0 Refills         Reported         9/8/20       Multivitamins (Multi-Vitamin) 1 Each Tablet      1 TAB PO QDAY, #30 TAB 0 Refills         Reported         9/8/20       Jeremias-Fluticasone (Fluticasone 50 mcg) 16 Gm Spray.susp      1 PUFFS NARE EACH BID, #1 BOTTLE 0 Refills         Reported         9/8/20       Azelastine Hcl  (Azelastine Nasal) 137 Mcg/0.137 Ml Spray.pump      1 PUFFS NARE EACH BID, #1 BOTTLE         Reported         9/8/20       Ferrous Sulfate (Iron Sulfate*) 325 Mg Tablet      325 MG PO QDAY, #30 TAB 0 Refills         Reported         9/8/20       metFORMIN ER (Glucophage XR) 500 Mg Tab.er.24h      500 MG PO BID, #60 TAB.ER 0 Refills         Reported         9/8/20       MDI-Albuterol (Ventolin HFA) 8 Gm Hfa.aer.ad      2 PUFFS INH Q4-6H PRN for DYSPNEA, #1 INH 2 Refills         Prov: TEAGAN PINEDO PA-C         8/20/20       Montelukast Sodium (Singulair*) 10 Mg Tab      10 MG PO HS, #90 TAB 4 Refills         Prov: TEAGAN PINEDO PA-C         8/20/20       Pantoprazole (Protonix) 20 Mg Tablet.      20 MG PO QDAY, #30 TAB 0 Refills         Prov: LEVAR STEPHENS         7/15/20       Aspirin EC (Aspirin EC) 81 Mg Tablet.      81 MG PO QDAY, #30 TAB.EC 0 Refills         Reported         7/13/20       Gemfibrozil (Gemfibrozil*) 600 Mg Tablet      600 MG PO BID, TAB         Reported         7/13/20       Linaclotide (Linzess) 145 Mcg Capsule      145 MCG PO QDAY, CAP         Reported         7/13/20       Mirtazapine (Remeron) 15 Mg Tablet      7.5 MG PO HS, #15 TAB 0 Refills         Reported         7/13/20       Budesonide/Formoterol Fumarate (Symbicort 160/4.5 Mcg) 10.2 Gm Inh      2 PUFF INH RTBID, #1 INH 0 Refills         Reported         7/13/20       buPROPion HCl XL (Wellbutrin XL) 150 Mg Tab.er.24h      150 MG PO QDAY for 30 Days, #30 TAB.ER.24H         Reported         7/13/20       Sertraline HCl (Sertraline*) 100 Mg Tablet      100 MG PO QDAY, #30 TAB 0 Refills         Reported         7/13/20       Levothyroxine Sodium (Levoxyl*) 0.112 Mg Tablet      0.112 MG PO QDAY@07, #30 TAB 0 Refills         Reported         7/13/20       Cetirizine Hcl (zyrTEC) 10 Mg Tablet      10 MG PO QDAY, #30 TAB 11 Refills         Prov: TEAGAN PINEDO PA-C         1/7/20       amLODIPine (amLODIPine) 10 Mg Tablet       10 MG PO QDAY, #30 TAB 0 Refills         Reported         11/23/15      Current Medications      Current Medications Reviewed 9/8/20            EXAM      CONSTITUTIONAL: Pleasant female in no acute distress,  normal conversant.       EYES : Pink conjunctive, no ptosis, PERRL.       ENMT : Nose and ears appear normal, normal dentition, mild posterior pharyngeal     wall erythema, no sinus tenderness. Mallampati classification       Neck: Nontender, no masses, no thyromegaly, no nodules.      Resp : Mildly decreased breath sounds throughout, no wheezes, rhonchi or     crackles, normal work of breathing noted.        CVS  : No carotid bruits, s1s2 nl, RRR, no murmur, rubs or gallop, no peripheral    edema       Chest wall: Normal rise with inspiration, nontender on palpation.      GI   : Abdomen soft, with no masses, no hepatosplenomegaly, no hernias, BS+      MSK  : Normal gait and station, no digital cyanosis or clubbing       Skin : No rashes, ulcerations or lesions, normal turgor and temperature      Neuro: CN II - XII intact, no sensory deficits, DTRs intact and symmetrical, no     motor weakness      Psych: Appropriate affect, A   Vitals      Vitals:             Height 5 ft 2 in / 157.48 cm           Weight 266 lbs 4 oz / 120.303580 kg           BSA 2.16 m2           BMI 48.7 kg/m2           Temperature 97.5 F / 36.39 C - Temporal           Pulse 84           Respirations 14           Blood Pressure 119/75 Sitting, Left Arm           Pulse Oximetry 98%, Room air            REVIEW      Results Reviewed      PCCS Results Reviewed?:  Yes Prev Lab Results, Yes Prev Radiology Results, Yes     [facility]            Assessment      Shortness of breath - R06.02            Notes      New Medications      * metFORMIN ER (Glucophage XR) 500 MG TAB.ER.24H: 500 MG PO BID #60      * FERROUS SULFATE (Iron Sulfate*) 325 MG TABLET: 325 MG PO QDAY #30      * AZELASTINE HCL (Azelastine Nasal) 137 MCG/0.137 ML  SPRAY.PUMP: 1 PUFFS NARE       EACH BID #1      * Jeremias-Fluticasone (Fluticasone 50 mcg) 16 GM SPRAY.SUSP: 1 PUFFS NARE EACH BID       #1      * MULTIVITAMINS (Multi-Vitamin) 1 EACH TABLET: 1 TAB PO QDAY #30      * Omega-3/Dha/Epa/Fish Oil (Fish Oil 500 Mg) 1 EACH CAPSULE.DR: 500 MG PO HS #30      * hydrOXYzine HCL 25 MG TABLET: 25 MG PO QID #120      * TIOTROPIUM BROMIDE (Spiriva Respimat 2.5 mcg/Puff) 4 GM MIST.INHAL: 2 PUFFS       INH RTQDAY #1         Dx: Shortness of breath - R06.02      * Albuterol/Ipratropium (Duoneb) 3 ML AMPUL.NEB: 3 ML INH Q4H PRN SHORTNESS OF       BREATH #120         Instructions: DIAGNOSIS CODE REQUIRED PRIOR TO PRESCRIBING.         Dx: Shortness of breath - R06.02      * Nicotine Polacrilex (Nicorette) 2 MG GUM: 2 MG PO Q4-6H #180         Dx: Shortness of breath - R06.02      * Nicotine Polacrilex (Nicotine) 2 MG LOZENGE: 2 MG BUCCAL Q4-6H #180         Dx: Shortness of breath - R06.02      Renewed Medications      * CETIRIZINE HCL (zyrTEC) 10 MG TABLET: 10 MG PO QDAY #30      New Diagnostics      * PFT-Comp, PrePost,DLCO,BodyBox, 2 Week         Dx: Shortness of breath - R06.02      * Echo Complete, 2 Week         Dx: Shortness of breath - R06.02      * [facility] Pre-Op Covid Screening, Routine         Dx: Encounter for screening for other viral diseases - Z11.59      ASSESSMENT:      1. Obstructive sleep apnea well controlled on nightly CPAP.       2. Severe persistent asthma currently appears not well controlled.       3. Exertional dyspnea gradually worsening.       4. Morbid obesity with BMI 48.7.       5. Seasonal allergies and postnasal drip appear well controlled on Singulair,     Zyrtec, flonase and astelin.       6. Cardiac murmur previously followed by cardiology but not seen recently.             PLAN:      1. I have discussed with the patient regarding current symptoms and plans going     forward.       2. Given her increased albuterol use and gradually worsening dyspnea, I will try     adding spiriva 2.5 2 puffs once daily to Symbicort 160. I have given her samples    and showed her how to use it today.       3. Continue albuterol as needed and hopefully her albuterol use will go down     once she is back on spiriva.       4. I will give her a nebulizer machine today and prescribed DuoNeb to use as     needed.       5. Continue Singulair, Zyrtec, flonase and astelin. I have sent refills for     Zyrtec at her request. She reports having plenty of refills on the others.       6. I will repeat pulmonary function test and do an echocardiogram given her     exertional dyspnea and her cardiac murmur. If her echocardiogram is abnormal I     will refer her back to cardiology.       7. I advised the patient that likely her smoking and her weight are contributing    to her dyspnea. I counseled the patient weight loss by decreasing caloric intake    and gradually increasing physical activity. The patient verbalized understanding      8. I counseled the patient on smoking cessation for 5 minutes, offered nicotine     replacement therapy and  pharmacotherapy and she would like to try nicotine gum     and lozenges in addition to wellbutrin she already takes through her primary     care provider. I discussed with her that I am concerned that her lung function     will continue to decline, her respiratory symptoms will worsen and she is at     increased risk of cardiovascular disease and various cancers if she continues to    smoke.        9. Continue nightly CPAP as prescribed.       10. Follow up in 2-3 months to review test results and see how she is doing with    the addition of spiriva. She may call sooner if needed.            Patient Education      Tobacco Cessation Counseling:  for 3 - 10 minutes      Patient Education Provided:  How to use an Inhaler, How to use a Nebulizer,     Sleep Apnea, Smoking Cessation      Time Spent:  > 50% /Coord Care               Until signed, this is an unconfirmed  preliminary report that may contain      errors and is subject to change.               <Electronically signed by TEAGAN PINEDO PA-C>                09/09/20 1020               TEAGAN PINEDO PA-C            Assessment      Sleep apnea         Obstructive sleep apnea syndrome - G47.33         Sleep apnea type: obstructive            BLAIRE (acute kidney injury) - N17.9            Bronchitis - J40            Asthma         Moderate persistent asthma with acute exacerbation - J45.41         Asthma severity: moderate         Asthma persistence: persistent         Asthma complication type: with acute exacerbation            Notes      New Medications      * MDI-Albuterol (Ventolin HFA) 8 GM HFA.AER.AD: 2 PUFFS INH RTTID #1      * CARIPRAZINE HYDROCHLORIDE (Vraylar) 6 MG CAPSULE: 6 MG PO QDAY #30      * predniSONE 20 MG TABLET: 40 MG PO QDAY #10      * Fluticasone/Umeclidin/Vilanter (Trelegy Ellipta 100-62.5-25) 1 EACH       BLST.W.DEV: 1 PUFF INH RTQDAY #1      * Beclomethasone Dipropionate (Qvar 80 Redihaler 10.6 GM) 10.6 GM HFA.AEROBA: 1       PUFF INH RTBID #1      * Beclomethasone Dipropionate (Qvar 80 Redihaler 10.6 GM) 10.6 GM HFA.AEROBA          Sample - Qty 2      * Fluticasone/Umeclidin/Vilanter (Trelegy Ellipta 100-62.5-25) 1 EACH BLST.W.DEV               Sample - Qty 2      Discontinued Medications      * Budesonide/Formoterol Fumarate (Symbicort 160/4.5 Mcg) 10.2 GM INH          Sample - Qty 2      * MDI-Albuterol (Ventolin HFA) 8 GM HFA.AER.AD: 2 PUFFS INH Q4-6H PRN DYSPNEA #1      * MDI-Albuterol (Ventolin HFA) 18 GM HFA.AER.AD: 1 PUFFS INH Q4H PRN SHORTNESS       OF BREATH 30 Days #1      * Fluticasone/Vilanterol 200-25 Mcg Inh (Breo Ellipta 200-25 Mcg Inh) 1 EACH       BLST.W.DEV: 1 PUFF INH QDAY 30 Days #1      New Diagnostics      * Echo Complete, Week         Dx: Sleep apnea - G47.30      * PFT-Comp, PrePost,DLCO,BodyBox, Week         Dx: Bronchitis - J40      * 6 Min Walk w O2 Titration Test,  Routine         Dx: Asthma - J45.909      New Office Procedures      * Pneumovax-23, As Soon As Possible         Pneumococcal Vaccine Polyvalen (Pneumovax-23) 25 MCG/0.5 ML VIAL: 25        MICROGRAM INTRAMUSCULARLY Qty 25 MCG      PLAN: The patient is a 44 year old female with morbid obesity, obstructive sleep    apnea and severe persistent asthma.        1. Severe persistent asthma. I will switch her inhalers to Trelegy and Qvar. Use    albuterol inhaler and nebulizer as needed.      2. Check pulmonary function test and six minute walk test.      3.  Check echocardiogram.      4. Obstructive sleep apnea.  Sleep hygiene was discussed in detail. Weight loss     counseling was done. Continue CPAP with current settings. I will review CPAP     usage data once it is available.       5. Asthma exacerbation action plan was discussed in detail.  I have give her a     prednisone burst 40 mg for five days with two refills if needed.      6. Follow up in 4-6 months, earlier if needed.            Patient Education      Education resources provided:  Yes      Patient Education Provided:  Acute Bronchitis            Patient Education:        Bronchitis (Adult)            Electronically signed by Torsten Christensen  12/16/2020 12:50       Disclaimer: Converted document may not contain table formatting or lab diagrams. Please see [employer] for the authenticated document.

## 2021-05-28 NOTE — PROGRESS NOTES
Patient: MARINO RAMIRES     Acct: [id number]     Report: #[id number]  UNIT #: [id number]     : 1975    Encounter Date:2020  PRIMARY CARE: ROCHELLE SWENSON  ***Signed***  --------------------------------------------------------------------------------------------------------------------  Chief Complaint      Encounter Date      2020            Primary Care Provider      MONY JACKSON            Referring Provider      Parth Nascimento            Patient Complaint      Patient is complaining of      Pt here for 2-3 month f/u, C pap compliance, LUCIANO            VITALS      Height 5 ft 1.00 in / 154.94 cm      Weight 261 lbs 2.000 oz / 118.147122 kg      BSA 2.12 m2      BMI 49.3 kg/m2      Temperature 98.3 F / 36.83 C - Oral      Pulse 87      Respirations 16      Blood Pressure 141/92 Sitting, Left Arm      Pulse Oximetry 98%, room air      Initial Exhaled Nitrous Oxide      Exhaled Nitrous Oxide Results:  9            HPI      The patient is a very pleasant 44 year old  female here for     routine follow up.  She was last seen by Dr. Christensen 4 months ago. She tells me     she has done fairly well since then. She has a history of obstructive sleep     apnea on nightly CPAP and her compliance is excellent. She has been using her     CPAP on average of over 9 hours per nigh, has used it 30/30 days, average apnea     hypopnea index is 0.3 and there is no significant air leak. Her average CPAP     pressure is 9.8 cm of water. She denies any issues using her CPAP but reports     that her wellbutrin dose was recently increased by her primary care provider for    her anxiety and depression and last night she really could not sleep well. She     is not sure if that is due to the wellbutrin or not. She denies any increased     dyspnea, coughing or wheezing, hemoptysis, fever or chills. She reports     compliance with Symbicort and feels that it is helping her. She continues to use    Singulair,  zyrtec, flonase and astelin nasal sprays and they all helping.             I reviewed the Review of Systems, medical, surgical and family history and agree    with those as entered.      Copies To:   Torsten Christensen      Constitutional:  Complains of: Fatigue; Denies: Fever, Weight gain, Weight loss,    Chills, Insomnia, Other      Respiratory/Breathing:  Denies: Shortness of air, Wheezing, Cough, Hemoptysis,     Pleuritic pain, Other      Endocrine:  Denies: Polydipsia, Polyuria, Heat/cold intolerance, Abnorml     menstrual pattern, Diabetes, Other      Eyes:  Denies: Blurred vision, Vision Changes, Other      Ears, nose, mouth, throat:  Denies: Mouth lesions, Thrush, Throat pain,     Hoarseness, Allergies/Hay Fever, Post Nasal Drip, Headaches, Recent Head Injury,    Nose Bleeding, Neck Stiffness, Thyroid Mass, Hearing Loss, Ear Fullness, Dry     Mouth, Nasal or Sinus Pain, Dry Lips, Nasal discharge, Nasal congestion, Other      Cardiovascular:  Denies: Palpitations, Syncope, Claudication, Chest Pain, Wake     up Gasping for air, Leg Swelling, Irregular Heart Rate, Cyanosis, Dyspnea on     Exertion, Other      Gastrointestinal:  Denies: Nausea, Constipation, Diarrhea, Abdominal pain,     Vomiting, Difficulty Swallowing, Reflux/Heartburn, Dysphagia, Jaundice,     Bloating, Melena, Bloody stools, Other      Genitourinary:  Denies: Urinary frequency, Incontinence, Hematuria, Urgency,     Nocturia, Dysuria, Testicular problems, Other      Musculoskeletal:  Denies: Joint Pain, Joint Stiffness, Joint Swelling, Myalgias,    Other      Hematologic/lymphatic:  DENIES: Lymphadenopathy, Bruising, Bleeding tendencies,     Other      Neurological:  Denies: Headache, Numbness, Weakness, Seizures, Other      Psychiatric:  Denies: Anxiety, Appropriate Effect, Depression, Other      Sleep:  No: Excessive daytime sleep, Morning Headache?, Snoring, Insomnia?, Stop    breathing at sleep?, Other      Integumentary:  Denies:  Rash, Dry skin, Skin Warm to Touch, Other      Immunologic/Allergic:  Denies: Latex allergy, Seasonal allergies, Asthma,     Urticaria, Eczema, Other      Immunization status:  No: Up to date            FAMILY/SOCIAL/MEDICAL HX      Current History      She is unemployed. She was working in factories, worked in [employer], and     multiple other places.        She has filed for disability. She lives with her son. Has 2 kids, one in     college.      Exsmoker- she started smoking when she was 23, smoked a pack a day for 13 years,    13 pack year history. She had second hand smoking exposure in past from her     family, not now.      No alcohol, illicit drugs.      Surgical History:  Yes: Bowel Surgery (COLONOSCOPY), Oral Surgery (WISDOM TEETH     CUT OUT ), Orthopedic Surgery (RIGHT ACHILLES TENDON REPAIR 2015, RIGHT    ACHILLES I  ligation); No: AAA Repair, Abdominal Surgery, Angioplasty, Appendectomy, Back     Surgery, Bladder Surgery, Breast Surgery, CABG, Carotid Stenosis,     Cholecystectomy, Ear Surgery, Eye Surgery, Head Surgery, Hernia Surgery, Kidney     Surgery, Nose Surgery, Prostatectomy, Rectal Surgery, Spinal Surgery, Testicular    Surgery, Throat Surgery, Valve Replacement, Vascular Surgery      Other Family Medical History:  Brother (Asthma), Sister (Asthma)      Is Father Still Living?:  Yes (COPD, CHF)      Is Mother Still Living?:  Yes (Asthma, COPD, HTN, Hypercholesterolemia)       Family History:  Yes      Social History:  Tobacco Use; No Alcohol Use, No Recreational Drug use      Smoking status:  Light tobacco smoker (.5 ppd x 15 years)      Smoking history:  10-25 pack years       Section:  Yes (, )      Tubal Ligation:  Yes      Anticoagulation Therapy:  No      Antibiotic Prophylaxis:  No      Medical History:  Yes: Allergies, Arthritis (knees, neck), Asthma (INHALER ),     Chemical Dependency, Chronic Bronchitis/COPD (INHALER), Depression, Anxiety,     [facility] Hemorrhoids/Rectal Prob (GERD,     IBS), High Blood Pressure (ON MED), Migrane Headaches, Shortness Of Breath,     Thyroid Problem; No: Alcoholism, Anemia, Blood Disease, Broken Bones, Cataracts,    Chemotherapy/Cancer, Emphysema, Chronic Liver Disease, Colon Trouble, Colitis,     Diverticulitis, Congestive Heart Failu, Deafness or Ringing Ears, Convulsions,     Diabetes (METFORMIN FOR PCOS ), Epilepsy, Seizures, Forgetfullness, Glaucoma,     Gall Stones, Gout, Head Injury, Heart Attack (HX OF SYNCOPE CARDIAC WORK UP ),     Heart Murmur, Hepatitis, Hiatal Hernia, High Cholesterol, HIV (Do not ask -     volu, Jaundice, Kidney or Bladder Disease, Kidney Stones, Mitral Valve Prolapse,    Night sweats, Phlebitis, Psychiatric Care, Reflux Disease, Rheumatic Fever,     Sexually Transmitted Dis, Sinus Trouble, Skin Disease/Psoriais/Ecz, Stroke,     Tuberculosis or Pos TB Te, Miscellaneous Medical/oth      Psychiatric History      depression and anxiety            PREVENTION      Hx Influenza Vaccination:  Yes      Date Influenza Vaccine Given:  Oct 1, 2019      Influenza Vaccine Declined:  No      2 or More Falls Past Year?:  No      Fall Past Year with Injury?:  No      Hx Pneumococcal Vaccination:  Yes      Encouraged to follow-up with:  PCP regarding preventative exams.      Chart initiated by      Jayda Kraft MA            ALLERGIES/MEDICATIONS      Allergies:        Coded Allergies:             VANCOMYCIN (Verified  Allergy, Severe, RASH, RENAL FAILURE, 1/7/20)           PROPOXYPHENE NAPSYLATE (Verified  Adverse Reaction, Severe, NAUSEA     VOMITING, 1/7/20)           LISINOPRIL (Unverified  Adverse Reaction, Intermediate, NAUSEA AND DIZZY,     1/7/20)      Medications    Last Reconciled on 1/7/20 09:12 by LAINE BAILEY      buPROPion HCl XL (Wellbutrin XL) 300 Mg Tab.er.24h      300 MG PO QDAY for 30 Days, #30 TAB.ER.24H         Reported         1/7/20       Eszopiclone  (LUNESTA) 1 Mg Tab      1 MG PO HS PRN for SLEEP, #30 TAB 0 Refills         Reported         1/7/20       Budesonide/Formoterol Fumarate (Symbicort 160/4.5 Mcg) 10.2 Gm Inh               Prov: JeremyTorsten caldwell         11/19/19       MDI-Albuterol (Ventolin HFA) 8 Gm Hfa.aer.ad      2 PUFFS INH Q4-6H PRN for DYSPNEA, #1 INH 2 Refills         Prov: Torsten Christensen         11/18/19       Albuterol/Ipratropium (Duoneb) 3 Ml Ampul.neb      3 ML INH RTQ4H, #180 NEB 9 Refills         Prov: Torsten Christensen         9/13/19       Azelastine Hcl (Azelastine Nasal) 137 Mcg/0.137 Ml Spray.pump      1 PUFFS NARE EACH BID, #1 BOTTLE 2 Refills         Prov: Torsten Christensen         6/24/19       Jeremias-Fluticasone (Fluticasone 50 mcg) 16 Gm Spray.susp      1 PUFFS NARE EACH BID, #1 BOTTLE 2 Refills         Prov: Torsten Christensen         6/24/19       Cyclobenzaprine Hcl (Cyclobenzaprine*) 10 Mg Tablet      10 MG PO BID PRN for MUSCLE SPASMS, TAB 0 Refills         Reported         1/15/19       Levothyroxine (Levothyroxine) 0.025 Mg Tablet      0.025 MG PO QDAY@07, #30 TAB 0 Refills         Reported         1/15/19       metFORMIN HCl (metFORMIN HCl) 500 Mg Tablet      500 MG PO BID for PCOS, #60 TAB 0 Refills         Reported         1/15/19       Pantoprazole (Protonix*) 40 Mg Tablet.dr      40 MG PO BIDAC, #60 TAB 3 Refills         Prov: Torsten Christensen         8/31/18       Montelukast Sodium (Singulair*) 10 Mg Tab      10 MG PO HS, #90 TAB 3 Refills         Prov: Torsten Christensen         8/23/18       Cetirizine Hcl (zyrTEC) 10 Mg Tablet      10 MG PO QDAY, #30 TAB 6 Refills         Prov: Torsten Christensen         5/23/18       amLODIPine (amLODIPine) 10 Mg Tablet      10 MG PO QDAY, #30 TAB 0 Refills         Reported         11/23/15       Ergocalciferol (Vitamin D2) (Vitamin D2) 50,000 Units Capsule      59500 UNITS PO Q30DAY, #8 CAP 0 Refills         Reported         9/18/15      Current Medications      Current Medications Reviewed 1/7/20            EXAM       CONSTITUTIONAL: Pleasant  normal conversant.       EYES : Pink conjunctive, no ptosis, PERRL.       ENMT : Nose and ears appear normal, normal dentition, mild posterior pharyngeal     wall erythema, no sinus tenderness. Mallampati classification       Neck: Nontender, no masses, no thyromegaly, no nodules.      Resp : Mildly decreased breath sounds throughout, no wheezes, rhonchi or     crackles, normal work of breathing noted.        CVS  : No carotid bruits, s1s2 nl, RRR, no murmur, rubs or gallop, no peripheral    edema       Chest wall: Normal rise with inspiration, nontender on palpation.      GI   : Abdomen soft, with no masses, no hepatosplenomegaly, no hernias, BS+      MSK  : Normal gait and station, no digital cyanosis or clubbing       Skin : No rashes, ulcerations or lesions, normal turgor and temperature      Neuro: CN II - XII intact, no sensory deficits, DTRs intact and symmetrical, no     motor weakness      Psych: Appropriate affect, A   Vtials      Vitals:             Height 5 ft 1.00 in / 154.94 cm           Weight 261 lbs 2.000 oz / 118.174747 kg           BSA 2.12 m2           BMI 49.3 kg/m2           Temperature 98.3 F / 36.83 C - Oral           Pulse 87           Respirations 16           Blood Pressure 141/92 Sitting, Left Arm           Pulse Oximetry 98%, room air            REVIEW      Results Reviewed      PCCS Results Reviewed?:  Yes Prev Lab Results, Yes Prev Radiology Results, Yes     [facility]            Assessment      Notes      New Medications      * ESZOPICLONE (LUNESTA) 1 MG TAB: 1 MG PO HS PRN SLEEP #30      * buPROPion HCl XL (Wellbutrin XL) 300 MG TAB.ER.24H: 300 MG PO QDAY 30 Days #30      * Nicotine 14 Mg Patch (Nicoderm 14 Mg Patch) 1 EACH PATCH.TD24: 14 MG TRANSDERM      QDAY #30      * Budesonide/Formoterol Fumarate (Symbicort 160/4.5 Mcg) 10.2 GM INH          Sample - Qty 2      Renewed Medications      * CETIRIZINE HCL (zyrTEC) 10 MG TABLET: 10 MG PO QDAY  #30      * Montelukast Sodium (Singulair*) 10 MG TAB: 10 MG PO HS #90      * Jeremias-Fluticasone (Fluticasone 50 mcg) 16 GM SPRAY.SUSP: 1 PUFFS NARE EACH BID       #1      * AZELASTINE HCL (Azelastine Nasal) 137 MCG/0.137 ML SPRAY.PUMP: 1 PUFFS NARE       EACH BID #1      Discontinued Medications      * Budesonide/Formoterol Fumarate (Symbicort 160/4.5 Mcg) 10.2 GM INH          Sample - Qty 2         Instructions: 2 puffs bid         Dx: Asthma - J45.909      * Budesonide/Formoterol Fumarate (Symbicort 160/4.5 Mcg) 10.2 GM INH          Sample - Qty 2      ASSESSMENT:       1. Obstructive sleep apnea well controlled on nightly CPAP.       2. Severe persistent asthma without acute exacerbation.       3. Morbid obesity with BMI 49.3.      4. Seasonal allergies.       5. Postnasal drip.             PLAN:       1. I have discussed with the patient regarding CPAP compliance and we will have     her continue on CPAP at current settings. It seems to be helping her quite a     bit. If she continues to have issues with insomnia, I have advised her to     discuss with her primary care provider about adjusting her wellbutrin dose as     she felt like she could not sleep after taking the increased dose of wellbutrin.          2. Her asthma currently appears well controlled on Symbicort 160 2 puffs twice     daily. Continue Symbicort and albuterol as needed.       3. Continue Singulair, zyrtec, flonase and astelin and refilled all of these     today.       4. She is up to date on flu and pneumonia vaccines per her report.       5. I advised her to work on weight loss by decreasing caloric intake and     gradually increasing physical activity.  The patient verbalized understanding.       6. Follow up in 6-8 months sooner if needed.            Patient Education      ACO BMI High above 25:  Counseling Given      Patient Education Provided:  Sleep Apnea      Time Spent:  > 50% /Coord Care            Electronically signed by  TEAGAN PINEDO PA-C  01/09/2020 15:04       Disclaimer: Converted document may not contain table formatting or lab diagrams. Please see [employer] for the authenticated document.

## 2021-05-28 NOTE — PROGRESS NOTES
Patient: MARINO RAMIRES     Acct: [id number]     Report: #[id number]  UNIT #: [id number]     : 1975    Encounter Date:2018  PRIMARY CARE: ROCHELLE SWENSON  ***Signed***  --------------------------------------------------------------------------------------------------------------------  Chief Complaint      Encounter Date      2018            Referring Provider      Parth Nascimento            Patient Complaint      Patient is complaining of      acute visit/ cough/ soa            VITALS      Height 5 ft 2 in / 157.48 cm      Weight 265 lbs 7 oz / 120.639323 kg      BSA 2.37 m2      BMI 48.5 kg/m2      Temperature 99.1 F / 37.28 C - Oral      Pulse 103      Respirations 22      Blood Pressure 120/85 Sitting, Right Arm      Pulse Oximetry 93%, room air            HPI      The patient is a pleasant, but ill appearing 42 year old obese -American     female.  She is a patient of Dr. Christensen's who has been sick for the past two     weeks or so.  She is complaining of shortness of breath, a deep severe cough     productive of yellow to green sputum associated with some chest pain and sore     throat.  She Does admit to fever, chills and body aches.  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  She went to an urgent care a week and a half ago and     was told that she had bronchitis and was given amoxicillin.  The amoxicillin     did not help with her symptoms at all and so she called our office and was     given a five day course of prednisone which helped a little bit, but then she     got sick again after the prednisone course was completed.  She does have asthma     and takes Breo 100 currently, but is unsure if it is really helping her or not.      She is a former smoker.  She denies any hemoptysis, any abdominal pain,     nausea or vomiting.  Her shortness of breath is worse with exertion and     relieved  with rest.              I have personally reviewed the review of systems, past family, social, surgical     and medical histories and I agree with the findings.            ROS      Constitutional:  Denies: Fatigue, Fever, Weight gain, Weight loss, Chills,     Insomnia, Other      Respiratory/Breathing:  Complains of: Shortness of air, Wheezing, Cough, Denies    : Hemoptysis, Pleuritic pain, Other      Endocrine:  Denies: Polydipsia, Polyuria, Heat/cold intolerance, Abnorml     menstrual pattern, Diabetes, Other      Eyes:  Denies: Blurred vision, Vision Changes, Other      Ears, nose, mouth, throat:  Denies: Mouth lesions, Thrush, Throat pain,     Hoarseness, Allergies/Hay Fever, Post Nasal Drip, Headaches, Recent Head Injury    , Nose Bleeding, Neck Stiffness, Thyroid Mass, Hearing Loss, Ear Fullness, Dry     Mouth, Nasal or Sinus Pain, Dry Lips, Nasal discharge, Nasal congestion, Other      Cardiovascular:  Denies: Palpitations, Syncope, Claudication, Chest Pain, Wake     up Gasping for air, Leg Swelling, Irregular Heart Rate, Cyanosis, Dyspnea on     Exertion, Other      Gastrointestinal:  Denies: Nausea, Constipation, Diarrhea, Abdominal pain,     Vomiting, Difficulty Swallowing, Reflux/Heartburn, Dysphagia, Jaundice, Bloating    , Melena, Bloody stools, Other      Genitourinary:  Denies: Urinary frequency, Incontinence, Hematuria, Urgency,     Nocturia, Dysuria, Testicular problems, Other      Musculoskeletal:  Denies: Joint Pain, Joint Stiffness, Joint Swelling, Myalgias    , Other      Hematologic/lymphatic:  DENIES: Lymphadenopathy, Bruising, Bleeding tendencies,     Other      Neurological:  Denies: Headache, Numbness, Weakness, Seizures, Other      Psychiatric:  Denies: Anxiety, Appropriate Effect, Depression, Other      Sleep:  No: Excessive daytime sleep, Morning Headache?, Snoring, Insomnia?,     Stop breathing at sleep?, Other      Integumentary:  Denies: Rash, Dry skin, Skin Warm to Touch, Other       Immunologic/Allergic:  Denies: Latex allergy, Seasonal allergies, Asthma,     Urticaria, Eczema, Other      Immunization status:  No: Up to date            FAMILY/SOCIAL/MEDICAL HX      Current History      She is unemployed. She was working in factories, worked in [employer], and     multiple other places.        She has filed for disability. She lives with her son. Has 2 kids, one in     college.      Exsmoker- she started smoking when she was 23, smoked a pack a day for 13 years    , 13 pack year history. She had second hand smoking exposure in past from her     family, not now.      No alcohol, illicit drugs.      Surgical History:  Yes: Oral Surgery (WISDOM TEETH CUT OUT ), Orthopedic     Surgery (RIGHT TENDON REPAIR 2015), Other Surgeries (2 cesserian section,     uterine ablation and tubal ligation), No: AAA Repair, Abdominal Surgery,     Angioplasty, Appendectomy, Back Surgery, Bladder Surgery, Bowel Surgery, Breast     Surgery, CABG, Carotid Stenosis, Cholecystectomy, Ear Surgery, Eye Surgery,     Head Surgery, Hernia Surgery, Kidney Surgery, Nose Surgery, Prostatectomy,     Rectal Surgery, Spinal Surgery, Testicular Surgery, Throat Surgery, Valve     Replacement, Vascular Surgery      Other Family Medical History:  Brother (Asthma), Sister (Asthma)      Is Father Still Living?:  Yes (COPD, CHF)      Is Mother Still Living?:  Yes (Asthma, COPD, HTN, Hypercholesterolemia)      Social History:  No Tobacco Use, No Alcohol Use, No Recreational Drug use      Smoking status:  Former smoker (13 years / 1/2 ppd)      Smoking history:  10-25 pack years       Section:  Yes (, )      Tubal Ligation:  Yes      Medical History:  Yes: Allergies, Arthritis (knees, neck pain), Asthma, Blood     Disease, Chemical Dependency, Chronic Bronchitis/COPD (intabated /coma),     Depression, Anxiety, Bipolar Disorder (DX 2007 BY [facility]), Hemorrhoids/    Rectal Prob (ibs, gerd), High Blood  Pressure, Migrane Headaches, Shortness Of     Breath (TAKES MEDICATION), Thyroid Problem, Miscellaneous Medical/oth (coughing     syncopal episode), No: Alcoholism, Anemia, Broken Bones, Cataracts, Chemotherapy    /Cancer, Emphysema, Chronic Liver Disease, Colon Trouble, Colitis,     Diverticulitis, Congestive Heart Failu, Deafness or Ringing Ears, Convulsions,     Diabetes ([employer] DIAB[employer]. takes metformin for polycystic ovarian syndrome)    , Epilepsy, Seizures, Forgetfullness, Glaucoma, Gall Stones, Gout, Head Injury,     Heart Attack, Heart Murmur, Hepatitis, Hiatal Hernia, High Cholesterol, HIV (Do     not ask - volu, Jaundice, Kidney or Bladder Disease, Kidney Stones, Mitral     Valve Prolapse, Night sweats, Phlebitis, Psychiatric Care, Reflux Disease,     Rheumatic Fever, Sexually Transmitted Dis, Sinus Trouble, Skin Disease/Psoriais/    Ecz, Stroke, Tuberculosis or Pos TB Te            Hx Influenza Vaccination:  Yes      Date Influenza Vaccine Given:  Sep 1, 2017      Influenza Vaccine Declined:  No      2 or More Falls Past Year?:  No      Fall Past Year with Injury?:  No      Hx Pneumococcal Vaccination:  Yes      Encouraged to follow-up with:  PCP regarding preventative exams.      Chart initiated by      benoit/ ma            ALLERGIES/MEDICATIONS      Allergies:        Coded Allergies:             PROPOXYPHENE NAPSYLATE (Verified  Allergy, Severe, NAUSEA VOMITING, 1/25/18    )           VANCOMYCIN (Verified  Allergy, Unknown, RASH , 1/25/18)           LISINOPRIL (Unverified  Adverse Reaction, Unknown, NAUSEA AND DIZZY, 1/25/ 18)      Medications    Last Reconciled on 1/29/18 18:14 by SANTINO HOLGUIN MD      Levofloxacin (Levaquin*) 500 Mg Tablet      500 MG PO QDAY for 7 Days, #7 TAB 0 Refills         Prov: Tami Giordano         1/25/18       predniSONE* (Deltasone*) 50 Mg Tab      50 MG PO QDAY for 7 Days, #7 TAB         Prov: Tami Giordano         1/25/18       Budesonide/Formoterol  Fumarate (Symbicort 160/4.5 Mcg) 10.2 Gm Inh      2 PUFF INH RTBID, #1 INH 6 Refills         Prov: Tami Giordano         1/25/18       Oseltamivir Phosphate (Tamiflu*) 75 Mg Capsule      75 MG PO BID for 5 Days, #10 CAP         Prov: Tami Giordano         1/25/18       MDI-Albuterol (Ventolin HFA*) 8 Gm Hfa.aer.ad      2 PUFFS INH Q4-6H Y for DYSPNEA, #1 INH 6 Refills         Prov: Torsten Christensen         1/11/18       Paroxetine Hcl (Paxil*) Unknown Strength Tablet      PO QDAY, TAB         Reported         1/11/18       Levothyroxine (Synthroid) Unknown Strength Tablet      PO QDAY@07, #30 TAB 0 Refills         Reported         1/11/18       Pantoprazole (Protonix*) 20 Mg Tablet.dr      40 MG PO BID, #60 TAB 6 Refills         Prov: Torsten Christensen         9/12/17       Vortioxetine Hydrobromide (TRINTELLIX) 5 Mg Tablet      5 MG PO HS, #30 TAB 0 Refills         Reported         9/12/17       Montelukast Sodium (Singulair*) 10 Mg Tab      10 MG PO QDAY, #30 TAB 1 Refill         Prov: Torsten Christensen         3/8/17       Cetirizine Hcl (ZyrTEC*) 10 Mg Tablet      10 MG PO QDAY, #30 TAB 6 Refills         Prov: Torsten Christensen         3/8/17       Jeremias-Fluticasone (Fluticasone 50 mcg) 16 Gm Spray.susp      2 PUFFS NARE EACH QDAY, #1 BOTTLE 6 Refills         Prov: Torsten Christensen         10/21/16       amLODIPine (amLODIPine) 10 Mg Tablet      10 MG PO QDAY, #30 TAB 0 Refills         Reported         11/23/15       Ergocalciferol (Vitamin D2*) 50,000 Units Capsule      76206 UNITS PO We, #8 CAP 0 Refills         Reported         9/18/15       buPROPion HCl XL (Wellbutrin XL) 300 Mg Tab.sr.24h      300 MG PO QDAY, TAB         Reported         12/31/14       Levothyroxine (Synthroid) 0.025 Mg Tablet      0.025 MG PO QDAY@07, #30 TAB 0 Refills         Reported         10/8/14      Current Medications      Current Medications Reviewed 1/25/18            EXAM      Vital Signs Reviewed.      General:  She is ill appearing, wearing a mask, appears  fatigued and is     morbidly obese. She became emotional while talking about how sick she feels.        HEENT: PERRL, EOMI.  OP, nares clear, no sinus tenderness.      Neck: Supple, no JVD, no thyromegaly.      Lymph: No axillary, cervical, supraclavicular lymphadenopathy noted bilaterally.      Chest: Lungs mildly decreased throughout with slightly coarse breath sounds     present.  No wheezes or rhonchi appreciated.        CV: RRR, no MGR, pulses 2+, equal.        Abd: Soft, NT, ND, +BS, no HSM.      EXT: No clubbing, no cyanosis, no edema, no joint tenderness.        Neuro:  A  Skin: No rashes or lesions.      Vtials      Vitals:             Height 5 ft 2 in / 157.48 cm           Weight 265 lbs 7 oz / 120.403206 kg           BSA 2.37 m2           BMI 48.5 kg/m2           Temperature 99.1 F / 37.28 C - Oral           Pulse 103           Respirations 22           Blood Pressure 120/85 Sitting, Right Arm           Pulse Oximetry 93%, room air            REVIEW      Results Reviewed      P[facility] Results Reviewed?:  Yes Prev Lab Results, Yes Prev Radiology Results, Yes     [facility]      Lab Results      I have personally reviewed her labs, imaging and prior provider notes including     a recent sputum culture that she had about one week ago.            PLAN      Assessment            Acute asthma exacerbation       Moderate persistent asthma with acute exacerbation       Asthma severity: moderate       Asthma persistence: persistent            CAP (community acquired pneumonia) - J18.9       Lung location: unspecified part of lung            Influenza - J11.1            Tobacco abuse, in remission - F17.201            Morbid obesity with BMI of 45.0-49.9, adult - E66.01, Z68.42            Cough - R05            TRACY (dyspnea on exertion) - R06.09            Notes      New Medications      * Oseltamivir Phosphate (Tamiflu*) 75 MG CAPSULE: 75 MG PO BID 5 Days #10       Dx: Asthma - J45.909      *  Budesonide/Formoterol Fumarate (Symbicort 160/4.5 Mcg) 10.2 GM INH: 2 PUFF     INH RTBID #1       Dx: Asthma - J45.909      * predniSONE* (Deltasone*) 50 MG TAB: 50 MG PO QDAY 7 Days #7       Dx: Asthma - J45.909      * Levofloxacin (Levaquin*) 500 MG TABLET: 500 MG PO QDAY 7 Days #7       Dx: Asthma - J45.909      Discontinued Medications      * Fluticasone/Vilanterol 100-25 Mcg Inh (Breo Ellipta 100-25 Mcg Inh) 1 EACH     BLST.W.DEV: 1 PUFF INH QDAY #1      * predniSONE* 20 MG TABLET: 40 MG PO QDAY 5 Days #10      * AMOXICILLIN/CLAVULANATE K (Augmentin 875 Mg) 1 EACH TABLET: 875 MG PO BID 7     Days #14      * Benzonatate 200 MG CAPSULE: 200 MG PO Q8H PRN COUGH #21      New Diagnostics      * Chest 2 View, As Soon As Possible       Dx: Asthma - J45.909      New Office Procedures      * Solu-Medrol 125 MG, As Soon As Possible       METHYLPRED SOD SUCC (Solu-Medrol) 125 MG VIAL: 125 MILLIGRAM INTRAMUSC Qty 1     VIAL       Dx: Asthma - J45.909      ASSESSMENT:       1.  Acute asthma exacerbation.      2.  Community acquired pneumonia suspected based on symptomatology.      3. Influenza highly suspected based on symptomatology and multiple sick     contacts who had influenza.      4. Tobacco abuse in remission.      5. Morbid obesity with BMI 48.5.      6. Cough.      7. Dyspnea on exertion.            PLAN:        1.  Given the duration and ongoing symptoms that she has, she very likely has     community acquired pneumonia plus or minus influenza.  Wwmichelle will start her on     Tamiflu x5 days and Levaquin 500 mg daily x7 days.  The Levaquin in particular     is because she had gram negative rods show up on the gram stain of her sputum     culture, although the overall sputum culture came back as normal davis.  We     will give her a 7 day course of prednisone 50 mg daily.  We will also give her     a Solu Medrol Shot of 125 mg today in the office as well as the DuoNeb in the     office.  As it is unclear if the Breo is  really helping her, we will place her     on Symbicort 160 and give her samples as well as a spacer and teach her how to     use this, thus we will discontinue the Breo.        2.  We will also check a PA and lateral chest x-ray to look for any     consolidation, infiltrate or effusion.        3.  She is up-to-date on her vaccinations.  She did already have a negative flu     swab one week ago at urgent care, but her symptoms do seem to be consistent     with the flu.  We will not repeat another flu swab, but we will empirically     treat her with Tamiflu.        4.  She will be seen in two weeks by Dr. Christensen to determine how she is doing     with this additional treatment. She was instructed to call our office for any     worsening symptoms in the next 1-2 days or as needed.            Patient Education      Patient Education Provided:  Acute Asthma, How to use an Inhaler, Influenza                 Disclaimer: Converted document may not contain table formatting or lab diagrams. Please see [employer] for the authenticated document.

## 2021-05-28 NOTE — PROGRESS NOTES
Patient: MARINO RAMIRES     Acct: [id number]     Report: #[id number]  UNIT #: [id number]     : 1975    Encounter Date:2019  PRIMARY CARE: ROCHELLE SWENSON  ***Signed***  --------------------------------------------------------------------------------------------------------------------  Chief Complaint      Encounter Date      Sep 13, 2019            Primary Care Provider      Parth Nascimento            Referring Provider      Parth Nascimento            Patient Complaint      Patient is complaining of      asthma follow up            VITALS      Height 5 ft 2 in / 157.48 cm      Weight 260 lbs 1 oz / 117.374512 kg      BSA 2.14 m2      BMI 47.6 kg/m2      Temperature 98.6 F / 37 C - Oral      Pulse 84      Respirations 16      Blood Pressure 8/66 Sitting, Right Arm      Pulse Oximetry 95%, room air      Initial Exhaled Nitrous Oxide      Exhaled Nitrous Oxide Results:  9            HPI      The patient is a 43 year old female with history of severe persistent asthma,     recurrent asthma exacerbations in the past, morbid obesity, cough, exertional     dyspnea,  gastroesophageal reflux disease, postnasal drip, reflux and     obstructive sleep apnea. She is here for follow up today.             Since her last office visit she has been on Symbicort 160/4.45 2 puffs twice     daily, spiriva once daily and albuterol as needed. She was supposed to be on     spiriva but did not feel a significant difference and is currently not using it.    She uses albuterol 2-3 times a week. She does not have any nocturnal awakenings     with cough, wheezing or shortness of breath.  She is also on Singulair, zyrtec     and Nasacort nasal spray. She used to be on phentermine but is not on it     anymore.  A  sleep study was ordered at the last office visit but she did not     get it done.  In the past she had sleep apnea and used a CPAP machine. She lost     80 pounds and she did not need her CPAP machine so she donated the CPAP  machine     to the [facility]. She has trouble falling asleep, she snores at night and     wakes up several times a nigh. She hardly gets 3-4 hours of sleep on a regular     basis and feels tired and sleepy during the daytime. Her [place] sleepiness     scale score is 6/24. She would like to have a  sleep study done.            ROS      Constitutional:  Complains of: Fatigue; Denies: Fever, Weight gain, Weight loss,    Chills, Insomnia, Other      Respiratory/Breathing:  Complains of: Shortness of air, Wheezing, Cough; Denies:    Hemoptysis, Pleuritic pain, Other      Endocrine:  Denies: Polydipsia, Polyuria, Heat/cold intolerance, Abnorml     menstrual pattern, Diabetes, Other      Eyes:  Denies: Blurred vision, Vision Changes, Other      Ears, nose, mouth, throat:  Denies: Mouth lesions, Thrush, Throat pain,     Hoarseness, Allergies/Hay Fever, Post Nasal Drip, Headaches, Recent Head Injury,    Nose Bleeding, Neck Stiffness, Thyroid Mass, Hearing Loss, Ear Fullness, Dry     Mouth, Nasal or Sinus Pain, Dry Lips, Nasal discharge, Nasal congestion, Other      Cardiovascular:  Denies: Palpitations, Syncope, Claudication, Chest Pain, Wake     up Gasping for air, Leg Swelling, Irregular Heart Rate, Cyanosis, Dyspnea on     Exertion, Other      Gastrointestinal:  Denies: Nausea, Constipation, Diarrhea, Abdominal pain,     Vomiting, Difficulty Swallowing, Reflux/Heartburn, Dysphagia, Jaundice,     Bloating, Melena, Bloody stools, Other      Genitourinary:  Denies: Urinary frequency, Incontinence, Hematuria, Urgency,     Nocturia, Dysuria, Testicular problems, Other      Musculoskeletal:  Denies: Joint Pain, Joint Stiffness, Joint Swelling, Myalgias,    Other      Hematologic/lymphatic:  DENIES: Lymphadenopathy, Bruising, Bleeding tendencies,     Other      Neurological:  Denies: Headache, Numbness, Weakness, Seizures, Other      Psychiatric:  Denies: Anxiety, Appropriate Effect, Depression, Other      Sleep:  No:  Excessive daytime sleep, Morning Headache?, Snoring, Insomnia?, Stop    breathing at sleep?, Other      Integumentary:  Denies: Rash, Dry skin, Skin Warm to Touch, Other      Immunologic/Allergic:  Denies: Latex allergy, Seasonal allergies, Asthma,     Urticaria, Eczema, Other      Immunization status:  No: Up to date            FAMILY/SOCIAL/MEDICAL HX      Current History      She is unemployed. She was working in factories, worked in [employer], and     multiple other places.        She has filed for disability. She lives with her son. Has 2 kids, one in     college.      Exsmoker- she started smoking when she was 23, smoked a pack a day for 13 years,    13 pack year history. She had second hand smoking exposure in past from her     family, not now.      No alcohol, illicit drugs.      Surgical History:  Yes: Bowel Surgery (COLONOSCOPY), Oral Surgery (WISDOM TEETH     CUT OUT ), Orthopedic Surgery (RIGHT ACHILLES TENDON REPAIR 2015, RIGHT    ACHILLES I  ligation); No: AAA Repair, Abdominal Surgery, Angioplasty, Appendectomy, Back     Surgery, Bladder Surgery, Breast Surgery, CABG, Carotid Stenosis,     Cholecystectomy, Ear Surgery, Eye Surgery, Head Surgery, Hernia Surgery, Kidney     Surgery, Nose Surgery, Prostatectomy, Rectal Surgery, Spinal Surgery, Testicular    Surgery, Throat Surgery, Valve Replacement, Vascular Surgery      Other Family Medical History:  Brother (Asthma), Sister (Asthma)      Is Father Still Living?:  Yes (COPD, CHF)      Is Mother Still Living?:  Yes (Asthma, COPD, HTN, Hypercholesterolemia)       Family History:  Yes      Social History:  No Tobacco Use, No Alcohol Use, No Recreational Drug use      Smoking status:  Former smoker (13 years / 1/2 ppd)      Smoking history:  10-25 pack years       Section:  Yes (1995)      Tubal Ligation:  Yes      Medical History:  Yes: Allergies, Arthritis (knees, neck), Asthma (INHALER ),     Chemical Dependency, Chronic  Bronchitis/COPD (INHALER), Depression, Anxiety,     [facility] Hemorrhoids/Rectal Prob (GERD,     IBS), High Blood Pressure (ON MED), Migrane Headaches, Shortness Of Breath,     Thyroid Problem; No: Alcoholism, Anemia, Blood Disease, Broken Bones, Cataracts,    Chemotherapy/Cancer, Emphysema, Chronic Liver Disease, Colon Trouble, Colitis,     Diverticulitis, Congestive Heart Failu, Deafness or Ringing Ears, Convulsions,     Diabetes (METFORMIN FOR PCOS ), Epilepsy, Seizures, Forgetfullness, Glaucoma,     Gall Stones, Gout, Head Injury, Heart Attack (HX OF SYNCOPE CARDIAC WORK UP ),     Heart Murmur, Hepatitis, Hiatal Hernia, High Cholesterol, HIV (Do not ask -     volu, Jaundice, Kidney or Bladder Disease, Kidney Stones, Mitral Valve Prolapse,    Night sweats, Phlebitis, Psychiatric Care, Reflux Disease, Rheumatic Fever,     Sexually Transmitted Dis, Sinus Trouble, Skin Disease/Psoriais/Ecz, Stroke,     Tuberculosis or Pos TB Te, Miscellaneous Medical/oth      Psychiatric History      depression and anxiety            PREVENTION      Hx Influenza Vaccination:  No      Date Influenza Vaccine Given:  Sep 1, 2017      Influenza Vaccine Declined:  Yes      2 or More Falls Past Year?:  No      Fall Past Year with Injury?:  No      Hx Pneumococcal Vaccination:  No      Encouraged to follow-up with:  PCP regarding preventative exams.      Chart initiated by      ra albarado ma            ALLERGIES/MEDICATIONS      Allergies:        Coded Allergies:             VANCOMYCIN (Verified  Allergy, Severe, RASH, RENAL FAILURE, 9/13/19)           PROPOXYPHENE NAPSYLATE (Verified  Adverse Reaction, Severe, NAUSEA     VOMITING, 9/13/19)           LISINOPRIL (Unverified  Adverse Reaction, Intermediate, NAUSEA AND DIZZY,     9/13/19)      Medications    Last Reconciled on 9/13/19 14:41 by MARGARITA ALVARADO MD      Budesonide/Formoterol Fumarate (Symbicort 160/4.5 Mcg) 10.2 Gm Inh               Prov:  Torsten Christensen         6/24/19       Azelastine Hcl (Azelastine Nasal) 137 Mcg/0.137 Ml Spray.pump      1 PUFFS NARE EACH BID, #1 BOTTLE 2 Refills         Prov: Torsten Christensen         6/24/19       Jeremias-Fluticasone (Fluticasone 50 mcg) 16 Gm Spray.susp      1 PUFFS NARE EACH BID, #1 BOTTLE 2 Refills         Prov: Torsten Christensen         6/24/19       MDI-Albuterol (Ventolin HFA) 8 Gm Hfa.aer.ad      2 PUFFS INH Q4-6H PRN for DYSPNEA, #1 INH 2 Refills         Prov: Torsten Christensen         6/24/19       Doxepin HCl (Doxepin) 50 Mg Capsule      50 MG PO HS PRN for SLEEP, CAP         Reported         1/15/19       Cyclobenzaprine Hcl (Cyclobenzaprine*) 10 Mg Tablet      10 MG PO BID PRN for MUSCLE SPASMS, TAB 0 Refills         Reported         1/15/19       Levothyroxine (Levothyroxine) 0.025 Mg Tablet      0.025 MG PO QDAY@07, #30 TAB 0 Refills         Reported         1/15/19       Gemfibrozil (Gemfibrozil*) 600 Mg Tablet      600 MG PO BID, TAB         Reported         1/15/19       Metformin Hcl (Metformin Hcl) 500 Mg Tablet      500 MG PO BID for PCOS, #60 TAB 0 Refills         Reported         1/15/19       Propranolol HCl (Propranolol HCl*) 20 Mg Tablet      40 MG PO BID for ANXIETY, TAB         Reported         1/15/19       Pantoprazole (Protonix*) 40 Mg Tablet.dr      40 MG PO BIDAC, #60 TAB 3 Refills         Prov: AmparoTorsten         8/31/18       Montelukast Sodium (Singulair*) 10 Mg Tab      10 MG PO HS, #90 TAB 3 Refills         Prov: AmparoTorsten         8/23/18       Cetirizine Hcl (ZyrTEC) 10 Mg Tablet      10 MG PO QDAY, #30 TAB 6 Refills         Prov: AmparoTorsten         5/23/18       Vortioxetine Hydrobromide (TRINTELLIX) 5 Mg Tablet      5 MG PO HS, #30 TAB 0 Refills         Reported         9/12/17       amLODIPine (amLODIPine) 10 Mg Tablet      10 MG PO QDAY, #30 TAB 0 Refills         Reported         11/23/15       Ergocalciferol (Vitamin D2*) 50,000 Units Capsule      38133 UNITS PO Q30DAY, #8 CAP 0 Refills          Reported         9/18/15      Current Medications      Current Medications Reviewed 19            EXAM      Vital Signs Reviewed.      General:  Mild respiratory distress.  Normal conversant.  Has a little bit of     dyspnea.        HEENT: PERRL, EOMI.  OP, nares clear, no sinus tenderness.      Neck: Supple, no JVD, no thyromegaly.      Lymph: No axillary, cervical, supraclavicular lymphadenopathy noted bilaterally.      Chest: Bilateral diminished breath sounds, resonate to percussion bilaterally.      No wheezes or rhonchi appreciated.        CV: RRR, no MGR, pulses 2+, equal.        Abd: Soft, NT, ND, +BS, no HSM.      EXT: No clubbing, no cyanosis, no edema, no joint tenderness.        Neuro:  A  Skin: No rashes or lesions.      Vtials      Vitals:             Height 5 ft 2 in / 157.48 cm           Weight 260 lbs 1 oz / 117.445485 kg           BSA 2.14 m2           BMI 47.6 kg/m2           Temperature 98.6 F / 37 C - Oral           Pulse 84           Respirations 16           Blood Pressure 8/66 Sitting, Right Arm           Pulse Oximetry 95%, room air            REVIEW      Results Reviewed      PCCS Results Reviewed?:  Yes Prev Lab Results, Yes Prev Radiology Results, Yes     Previous Mecial Records      Radiographic Results               [facility]                PACS RADIOLOGY REPORT            Patient: MARINO RAMIRES   Acct: #[id number]   Report: #[id number]            UNIT #: [id number]    DOS: 18 1927      INSURANCE:[place], [place]   ORDER #:[id number]      LOCATION:ER     : 1975            PROVIDERS      ADMITTING:     ATTENDING:       FAMILY:  Parth Nascimento   ORDERING:  ARMANDO FERNANDEZ         OTHER:    DICTATING:  Mg Reeves MD            REQ #:[id number]   EXAM:CXR1 - CHEST 1 View AP PA      REASON FOR EXAM:  Chest Pain      REASON FOR VISIT:  PAIN IN CHEST,NECK, BACK AND ARM            *******Signed******          PROCEDURE:   CHEST AP/PA SINGLE VIEW             COMPARISON:   [facility], CR, CHEST AP/PA 1 VIEW, 4/19/2018,     11:19.             INDICATIONS:   chest pains, short of air, today             FINDINGS:         The heart is normal in size.  The lungs are well-expanded and free of     infiltrates.             Bony structures appear intact.             CONCLUSION:   No active disease is seen.              RYAN CONDE MD             Electronically Signed and Approved By: RYAN CONDE MD on 8/02/2018 at 20:10                           Until signed, this is an unconfirmed preliminary report that may contain      errors and is subject to change.                                              COUST:      D:08/02/18 2010      PFT Results      [id number]  [id number]                                       [facility]                          [facility] [employer] Services                            [place], [place]  [address]               __________________________________________________________________________             Patient Name:                   Attending Physician:      Jaycee Le M.D.             Patient Visit # MR #            Admit Date  Disch Date     Location      [id number]    [id number]      08/16/2017                 [facility] -             Date of Birth      1975      __________________________________________________________________________      0821 - DIAGNOSTIC REPORT             PULMONARY FUNCTION TEST             SPIROMETRY:      Spirometry is normal.      FEV1/FVC is 76%.      FEV1 is 2.30 L, 99% of predicted.      FVC is 3.02 L, 107% of predicted.             BRONCHODILATOR RESPONSE:      There is no significant response to bronchodilator administration.             LUNG VOLUMES:      Lung volumes show hyperinflation and air trapping.      Total lung capacity is 5.65 L, 138% of predicted.      Residual  volume is 2.60 L, 195% of predicted.             DIFFUSION:      Diffusion capacity is normal, 93% of predicted.             FLOW VOLUME LOOP:      Flow volume loop is normal.             CONCLUSION:      Normal spirometry with normal diffusion capacity and hyperinflation and air      trapping.  It could suggest early obstructive airway disease.  Patient with      clinical history of asthma.  Hyperinflation and air trapping is likely      related to mild asthma.  Please correlate clinically.             To be electronically signed in [facility]      [id number] TORSTEN CHRISTENSEN M.D.             NK:rt      D:  08/18/2017 16:02      T:  08/18/2017 17:20      #[id number]             Until signed, this is an unconfirmed preliminary report that may contain      errors and is subject to change.                   08/18/17 2211  <Electronically signed by Torsten Christensen MD>            Assessment      Notes      New Medications      * Budesonide/Formoterol Fumarate (Symbicort 160/4.5 Mcg) 10.2 GM INH          Sample - Qty 2      * Albuterol/Ipratropium (Duoneb) 3 ML AMPUL.NEB: 3 ML INH RTQ4H #180         Instructions: DIAGNOSIS CODE REQUIRED PRIOR TO PRESCRIBING.      Discontinued Medications      * TIOTROPIUM BROMIDE (Spiriva Respimat 1.25 mcg/puff) 4 GM MIST.INHAL: 2 PUFFS       INH RTQDAY #1      * Ketorolac Tromethamine (Toradol) 10 MG TABLET: 10 MG PO Q8HR #15         Instructions: 1 TAB      New Diagnostics      * Basic Sleep Study, Week         Dx: Sleep apnea - G47.30      PLAN:        The patient is a 43 year old female with a history of severe persistent asthma     who had recurrent asthma exacerbation. She also has morbid obesity, cough and     exertional dyspnea as well as gastrointestinal esophageal reflux disease,     postnasal drip, reflux and likely obstructive sleep apnea.             1.  Asthma. Continue with Symbicort 160/4.5 two puffs twice daily and albuterol     as needed.   I will send a script for DuoNeb to use at home.        2.  Continue with Singulair once daily, zyrtec and Nasacort.       3.  Obstructive sleep apnea. Sleep hygiene was discussed in detail and weight     loss counseling was done. Check sleep study now, will consider home sleep study     if it would be approved by insurance. Her [place] sleepiness scale score was     6/24. If her  sleep study is positive we will have her set up with a CPAP     machine and follow up after 3 months.       4. I advised her to get flu vaccine when it is available in fall 2019.       5.  I will follow up with her in 2-3 months, earlier if needed.            Patient Education      Education resources provided:  Yes      Patient Education Provided:  Acute Bronchitis                 Disclaimer: Converted document may not contain table formatting or lab diagrams. Please see [employer] [facility] for the authenticated document.

## 2021-06-05 NOTE — PROGRESS NOTES
Progress Note      Patient Name: Jaycee Le   Patient ID: [id number]   Sex: Female   YOB: 1975    Primary Care Provider: Kia De León RD    Visit Date: May 10, 2021    Provider: TAMIA Gannon   [facility]   Location Address: [address] [place] [address], [address]  [place], [place]  [id number]   Location Phone: [phone]          Chief Complaint  · Follow up Constipation      History Of Present Illness     Ms. Le presents for follow-up of GERD, constipation and IBS.    States that she has noticed some blood in stool.  Feels like it's from straining.  States that she has a bowel movement 2-3 times per day.  Describes stool to be soft.   She's unsure why she's straining.      Dicyclomine is helpful for abdominal cramping.      Still experiencing belching, but improved from before.             Past Medical History  Achilles tendon rupture; Anxiety; Apnea; Arthritis; Asthma; Bipolar disorder; Broken Bones; Bunion; Chronic bronchitis; Clot; COPD (chronic obstructive pulmonary disease); Depression; Foot pain, bilateral; Foot ulcer; Forgetfulness; GERD (gastroesophageal reflux disease); Magalie's deformity, right; Heart Murmur; Heel pain; Hemorrhoids; High blood pressure; Hyperlipidemia; Hypertension; Hypothyroidism; IBS (irritable bowel syndrome); Kidney disease; Migraine Headaches; NIght Sweats; Numbness in feet; Obesity; Pain: Knee; Patella Chondromalacia; Patella Maltracking; Plantar fascial fibromatosis of both feet; Plantar wart; Polycystic ovarian syndrome; Shortness of breath; Sinus Trouble         Past Surgical History  Achilles tendon repair; Cesarian Section; Colonoscopy; Tubal ligation; Uterine ablation         Medication List  aspirin 81 mg oral tablet,chewable; cetirizine 10 mg oral tablet; dicyclomine 20 mg oral tablet; gemfibrozil 600 mg oral tablet; hydroxyzine HCl 25 mg oral tablet; levothyroxine 25 mcg oral tablet; Linzess 145 mcg oral capsule;  "metformin 500 mg oral tablet; metoprolol succinate 25 mg oral tablet extended release 24 hr; mirtazapine 7.5 mg oral tablet; montelukast 10 mg oral tablet; multivitamin oral tablet; pantoprazole 40 mg oral tablet,delayed release (DR/EC); sertraline 50 mg oral tablet; Vitamin D2 1,250 mcg (50,000 unit) oral capsule; Vraylar 3 mg oral capsule; zolpidem 10 mg oral tablet         Allergy List  Darvocet-N 100; doxycycline hyclate; lisinopril; oxycodone; vancomycin       Allergies Reconciled  Family Medical History  Lung Neoplasm, Malignant; Heart Disease; Diabetes, unspecified type; Prostate Cancer; Throat Cancer; Colon Cancer; Ovarian Cancer, Family History         Reproductive History   0 Para 0 0 0 0       Social History  Alcohol (Light); Cocaine (Former); Exercises regularly; MWM Goal Statement:; Tobacco (Current some day)         Immunizations  Name Date Admin   Influenza 11/15/2013         Review of Systems  · Constitutional  o Denies  o : chills, fever  · Cardiovascular  o Denies  o : chest pain, irregular heart beats  · Respiratory  o Denies  o : cough, shortness of breath  · Gastrointestinal  o Admits  o : see HPI   · Endocrine  o Denies  o : weight gain, weight loss      Vitals  Date Time BP Position Site L\R Cuff Size HR RR TEMP (F) WT  HT  BMI kg/m2 BSA m2 O2 Sat FR L/min FiO2 HC       05/10/2021 02:48 /64 Sitting    73 - R  97.4 282lbs 6oz 5'  2\" 51.65 2.37             Physical Examination  · Constitutional  o Appearance  o : Healthy-appearing, awake and alert in no acute distress  · Head and Face  o Head  o : Normocephalic with no worriesome skin lesions  · Eyes  o Vision  o :   § Visual Fields  § : eyes move symmetrical in all directions  o Sclerae  o : sclerae anicteric  o Pupils and Irises  o : pupils equal and symmetrical  · Neck  o Inspection/Palpation  o : Trachea is midline, no adenopathy  · Respiratory  o Respiratory Effort  o : Breathing is unlabored.  o Inspection of Chest  o : normal " appearance  o Auscultation of Lungs  o : Chest is clear to auscultation bilaterally.  · Cardiovascular  o Heart  o :   § Auscultation of Heart  § : no murmurs, rubs, or gallops  o Peripheral Vascular System  o :   § Extremities  § : no cyanosis, clubbing or edema;   · Gastrointestinal  o Abdominal Examination  o : Abdomen is soft, nontender to palpation, with normal active bowel sounds, no appreciable hepatosplenomegaly.  o Digital Rectal Exam  o : deferred  · Skin and Subcutaneous Tissue  o General Inspection  o : without focal lesions; turgor is normal  · Psychiatric  o General  o : Alert and oriented x3  o Mood and Affect  o : Mood and affect are appropriate to circumstances  · Extremities  o Extremities  o : No edema, no cyanosis          Assessment  · Constipation     564.00/K59.00  · Gastroesophageal Reflux     530.81/K21.9  · Internal hemorrhoids     455.0/K64.8  · Rectal Bleeding     569.3/K62.5      Plan  · Medications  o Proctozone-HC 2.5 % topical cream with perineal applicator   SIG: apply a thin layer to the affected area(s) by topical route 2 times per day for 14 days   DISP: (1) Tube with 2 refills  Prescribed on 05/10/2021     o Linzess 145 mcg oral capsule   SIG: take 1 capsule (145 mcg) by oral route once daily on an empty stomach at least 30 minutes before 1st meal of the day   DISP: (90) Capsule with 3 refills  Refilled on 05/10/2021     o pantoprazole 40 mg oral tablet,delayed release (DR/EC)   SIG: take 1 tablet (40 mg) by oral route once daily for 90 days   DISP: (90) Tablet with 3 refills  Refilled on 05/10/2021     o Medications have been Reconciled  · Instructions  o Information given on current diagnoses.  · Disposition  o Follow up 6 months            Electronically Signed by: TAMIA Gannon -Author on May 10, 2021 04:03:18 PM

## 2021-06-16 RX ORDER — IPRATROPIUM BROMIDE AND ALBUTEROL SULFATE 2.5; .5 MG/3ML; MG/3ML
3 SOLUTION RESPIRATORY (INHALATION) 4 TIMES DAILY PRN
Qty: 120 ML | Refills: 5 | Status: SHIPPED | OUTPATIENT
Start: 2021-06-16

## 2021-07-02 ENCOUNTER — HOSPITAL ENCOUNTER (OUTPATIENT)
Dept: CARDIOLOGY | Facility: HOSPITAL | Age: 46
Discharge: HOME | End: 2021-07-02
Admitting: INTERNAL MEDICINE

## 2021-07-02 DIAGNOSIS — J45.901 ASTHMA WITH ACUTE EXACERBATION, UNSPECIFIED ASTHMA SEVERITY, UNSPECIFIED WHETHER PERSISTENT: ICD-10-CM

## 2021-07-02 DIAGNOSIS — J40 BRONCHITIS: ICD-10-CM

## 2021-07-02 PROCEDURE — 93306 TTE W/DOPPLER COMPLETE: CPT

## 2021-07-02 PROCEDURE — 93306 TTE W/DOPPLER COMPLETE: CPT | Performed by: SPECIALIST

## 2021-07-03 LAB
BH CV ECHO MEAS - AO ROOT DIAM: 3 CM
BH CV ECHO MEAS - EDV(MOD-SP2): 71.8 ML
BH CV ECHO MEAS - EDV(MOD-SP4): 86.7 ML
BH CV ECHO MEAS - EF(MOD-BP): 73 %
BH CV ECHO MEAS - ESV(MOD-SP2): 19.4 ML
BH CV ECHO MEAS - ESV(MOD-SP4): 23 ML
BH CV ECHO MEAS - IVSD: 1.1 CM
BH CV ECHO MEAS - LA DIMENSION(2D): 4.2 CM
BH CV ECHO MEAS - LAT PEAK E' VEL: 11.7 CM/SEC
BH CV ECHO MEAS - LVIDD: 4 CM
BH CV ECHO MEAS - LVIDS: 2.1 CM
BH CV ECHO MEAS - LVOT DIAM: 2 CM
BH CV ECHO MEAS - LVPWD: 1.2 CM
BH CV ECHO MEAS - MED PEAK E' VEL: 8.1 CM/SEC
BH CV ECHO MEAS - MV A MAX VEL: 107 CM/SEC
BH CV ECHO MEAS - MV DEC TIME: 238 MSEC
BH CV ECHO MEAS - MV E MAX VEL: 129 CM/SEC
BH CV ECHO MEAS - MV E/A: 1.2
BH CV ECHO MEAS - MV MAX PG: 7 MMHG
BH CV ECHO MEAS - MV MEAN PG: 3 MMHG
BH CV ECHO MEAS - MV P1/2T: 58 MSEC
BH CV ECHO MEAS - MV V2 VTI: 35 CM
BH CV ECHO MEAS - MVA(P1/2T): 3.8 CM2
BH CV ECHO MEAS - RAP SYSTOLE: 3 MMHG
BH CV ECHO MEAS - RVDD: 2.4 CM
BH CV ECHO MEAS - RVSP: 28 MMHG
BH CV ECHO MEAS - TR MAX PG: 25 MMHG
BH CV ECHO MEAS - TR MAX VEL: 249 CM/SEC
BH CV ECHO MEASUREMENTS AVERAGE E/E' RATIO: 13.03
IVRT: 87 MSEC
LEFT ATRIUM VOLUME INDEX: 25.4 ML/M2
MAXIMAL PREDICTED HEART RATE: 173 BPM
STRESS TARGET HR: 147 BPM

## 2021-07-14 RX ORDER — CETIRIZINE HYDROCHLORIDE 10 MG/1
TABLET ORAL
COMMUNITY
Start: 2021-04-23 | End: 2021-07-14 | Stop reason: SDUPTHER

## 2021-07-14 RX ORDER — MONTELUKAST SODIUM 10 MG/1
10 TABLET ORAL NIGHTLY
Qty: 30 TABLET | Refills: 3 | Status: SHIPPED | OUTPATIENT
Start: 2021-07-14 | End: 2021-12-15

## 2021-07-14 RX ORDER — MONTELUKAST SODIUM 10 MG/1
TABLET ORAL
COMMUNITY
Start: 2021-03-25 | End: 2021-07-14 | Stop reason: SDUPTHER

## 2021-07-14 RX ORDER — CETIRIZINE HYDROCHLORIDE 10 MG/1
10 TABLET ORAL DAILY
Qty: 30 TABLET | Refills: 3 | Status: SHIPPED | OUTPATIENT
Start: 2021-07-14 | End: 2021-11-29

## 2021-07-15 VITALS
WEIGHT: 282.37 LBS | DIASTOLIC BLOOD PRESSURE: 64 MMHG | HEART RATE: 73 BPM | BODY MASS INDEX: 51.96 KG/M2 | SYSTOLIC BLOOD PRESSURE: 105 MMHG | HEIGHT: 62 IN | TEMPERATURE: 97.4 F

## 2021-07-16 ENCOUNTER — TRANSCRIBE ORDERS (OUTPATIENT)
Dept: ADMINISTRATIVE | Facility: HOSPITAL | Age: 46
End: 2021-07-16

## 2021-07-16 DIAGNOSIS — J40 BRONCHITIS: Primary | ICD-10-CM

## 2021-07-16 DIAGNOSIS — J45.909 ASTHMA, UNSPECIFIED ASTHMA SEVERITY, UNSPECIFIED WHETHER COMPLICATED, UNSPECIFIED WHETHER PERSISTENT: ICD-10-CM

## 2021-07-16 DIAGNOSIS — J45.909 ALLERGIC ASTHMA WITH STATED CAUSE: ICD-10-CM

## 2021-07-21 ENCOUNTER — APPOINTMENT (OUTPATIENT)
Dept: RESPIRATORY THERAPY | Facility: HOSPITAL | Age: 46
End: 2021-07-21

## 2021-08-03 NOTE — PATIENT INSTRUCTIONS
Asthma, Adult    Asthma is a long-term (chronic) condition in which the airways get tight and narrow. The airways are the breathing passages that lead from the nose and mouth down into the lungs. A person with asthma will have times when symptoms get worse. These are called asthma attacks. They can cause coughing, whistling sounds when you breathe (wheezing), shortness of breath, and chest pain. They can make it hard to breathe. There is no cure for asthma, but medicines and lifestyle changes can help control it.  There are many things that can bring on an asthma attack or make asthma symptoms worse (triggers). Common triggers include:  · Mold.  · Dust.  · Cigarette smoke.  · Cockroaches.  · Things that can cause allergy symptoms (allergens). These include animal skin flakes (dander) and pollen from trees or grass.  · Things that pollute the air. These may include household , wood smoke, smog, or chemical odors.  · Cold air, weather changes, and wind.  · Crying or laughing hard.  · Stress.  · Certain medicines or drugs.  · Certain foods such as dried fruit, potato chips, and grape juice.  · Infections, such as a cold or the flu.  · Certain medical conditions or diseases.  · Exercise or tiring activities.  Asthma may be treated with medicines and by staying away from the things that cause asthma attacks. Types of medicines may include:  · Controller medicines. These help prevent asthma symptoms. They are usually taken every day.  · Fast-acting reliever or rescue medicines. These quickly relieve asthma symptoms. They are used as needed and provide short-term relief.  · Allergy medicines if your attacks are brought on by allergens.  · Medicines to help control the body's defense (immune) system.  Follow these instructions at home:  Avoiding triggers in your home  · Change your heating and air conditioning filter often.  · Limit your use of fireplaces and wood stoves.  · Get rid of pests (such as roaches and  mice) and their droppings.  · Throw away plants if you see mold on them.  · Clean your floors. Dust regularly. Use cleaning products that do not smell.  · Have someone vacuum when you are not home. Use a vacuum  with a HEPA filter if possible.  · Replace carpet with wood, tile, or vinyl jenni. Carpet can trap animal skin flakes and dust.  · Use allergy-proof pillows, mattress covers, and box spring covers.  · Wash bed sheets and blankets every week in hot water. Dry them in a dryer.  · Keep your bedroom free of any triggers.  · Avoid pets and keep windows closed when things that cause allergy symptoms are in the air.  · Use blankets that are made of polyester or cotton.  · Clean bathrooms and nichelle with bleach. If possible, have someone repaint the walls in these rooms with mold-resistant paint. Keep out of the rooms that are being cleaned and painted.  · Wash your hands often with soap and water. If soap and water are not available, use hand .  · Do not allow anyone to smoke in your home.  General instructions  · Take over-the-counter and prescription medicines only as told by your doctor.  ? Talk with your doctor if you have questions about how or when to take your medicines.  ? Make note if you need to use your medicines more often than usual.  · Do not use any products that contain nicotine or tobacco, such as cigarettes and e-cigarettes. If you need help quitting, ask your doctor.  · Stay away from secondhand smoke.  · Avoid doing things outdoors when allergen counts are high and when air quality is low.  · Wear a ski mask when doing outdoor activities in the winter. The mask should cover your nose and mouth. Exercise indoors on cold days if you can.  · Warm up before you exercise. Take time to cool down after exercise.  · Use a peak flow meter as told by your doctor. A peak flow meter is a tool that measures how well the lungs are working.  · Keep track of the peak flow meter's readings.  Write them down.  · Follow your asthma action plan. This is a written plan for taking care of your asthma and treating your attacks.  · Make sure you get all the shots (vaccines) that your doctor recommends. Ask your doctor about a flu shot and a pneumonia shot.  · Keep all follow-up visits as told by your doctor. This is important.  Contact a doctor if:  · You have wheezing, shortness of breath, or a cough even while taking medicine to prevent attacks.  · The mucus you cough up (sputum) is thicker than usual.  · The mucus you cough up changes from clear or white to yellow, green, gray, or bloody.  · You have problems from the medicine you are taking, such as:  ? A rash.  ? Itching.  ? Swelling.  ? Trouble breathing.  · You need reliever medicines more than 2-3 times a week.  · Your peak flow reading is still at 50-79% of your personal best after following the action plan for 1 hour.  · You have a fever.  Get help right away if:  · You seem to be worse and are not responding to medicine during an asthma attack.  · You are short of breath even at rest.  · You get short of breath when doing very little activity.  · You have trouble eating, drinking, or talking.  · You have chest pain or tightness.  · You have a fast heartbeat.  · Your lips or fingernails start to turn blue.  · You are light-headed or dizzy, or you faint.  · Your peak flow is less than 50% of your personal best.  · You feel too tired to breathe normally.  Summary  · Asthma is a long-term (chronic) condition in which the airways get tight and narrow. An asthma attack can make it hard to breathe.  · Asthma cannot be cured, but medicines and lifestyle changes can help control it.  · Make sure you understand how to avoid triggers and how and when to use your medicines.  This information is not intended to replace advice given to you by your health care provider. Make sure you discuss any questions you have with your health care provider.  Document Revised:  02/20/2020 Document Reviewed: 01/22/2018  Elsevier Patient Education © 2021 [employer]

## 2021-08-03 NOTE — PROGRESS NOTES
Primary Care Provider  Provider, No Known     Referring Provider  No ref. provider found     Chief Complaint  Follow-up    Subjective          History of Presenting Illness  Patient is a 45-year-old -American female, patient of Dr. Sun who has morbid obesity, obstructive sleep apnea on nightly CPAP and severe persistent asthma who presents for follow-up visit today.  Patient states that since last visit her breathing is doing well.  Patient states she is taking Qvar and Trelegy Ellipta inhaler every day as prescribed use albuterol inhaler as needed.  Patient states he is taking Singulair, Zyrtec, Flonase, and Astelin nasal spray for seasonal allergies.  Patient states that she is using her CPAP every night which helps her sleep.  Patient denies any excessive daytime sleepiness and morning headaches.  Upon reviewing patient's CPAP compliance report over the last 30 days her average daily use is 8 hours and 40 minutes with auto titrating pressures of 5 to 20 cm of water with a median pressure of 11.5 cm of water with a apnea hypopnea index of 0.2.  Patient states she has not had to take any antibiotics or steroids since last visit and denies any hospitalizations since last visit.  Patient states she is can to be having bariatric surgery in October 2021.  Patient states she will be having gastric sleeve procedure and will need surgical clearance.  Patient was scheduled to have a pulmonary function test last office visit unfortunately patient did not show up for the test.  Patient is needing to have this rescheduled. Patient had an echocardiogram completed on 7/3/2021.  Echocardiogram report showed normal left ventricular systolic function.  Trace tricuspid regurgitation.  Patient states that she is scheduled to see a cardiologist for surgical clearance for bariatric surgery.  Patient denies fever, chills, night sweats, swollen glands in the head and neck, unintentional weight loss, hemoptysis, purulent  sputum production, dysphagia, chest pain, palpitations, chest tightness, abdominal pain, nausea, vomiting, and diarrhea.  Patient also denies any myalgias, changes in sense of taste and/or smell, sore throat, any other coronavirus flulike symptoms.  Patient denies any leg swelling, orthopnea, paroxysmal nocturnal dyspnea.  Patient is able to perform her activities of daily living without difficulty.    Review of Systems   Constitutional: Negative for activity change, appetite change, chills, diaphoresis, fatigue, fever, unexpected weight gain and unexpected weight loss.        Negative for Insomnia   HENT: Negative for congestion (Nasal), mouth sores, nosebleeds, postnasal drip, sore throat, swollen glands and trouble swallowing.         Negative for Thrush  Negative for Hoarseness  Negative for Allergies/Hay Fever  Negative for Recent Head injury  Negative for Ear Fullness  Negative for Nasal or Sinus pain  Negative for Dry lips  Negative for Nasal discharge   Respiratory: Positive for shortness of breath (at times with exertion). Negative for apnea, cough, chest tightness and wheezing.         Negative for Hemoptysis  Negative for Pleuritic pain   Cardiovascular: Negative for chest pain, palpitations and leg swelling.        Negative for Claudication  Negative for Cyanosis  Negative for Dyspnea on exertion   Gastrointestinal: Negative for abdominal pain, diarrhea, nausea, vomiting and GERD.   Musculoskeletal: Negative for joint swelling and myalgias.        Negative for Joint pain  Negative for Joint stiffness   Skin: Negative for color change, dry skin, pallor and rash.   Neurological: Negative for seizures, syncope, weakness and headache.   Hematological: Negative for adenopathy. Does not bruise/bleed easily.        Family History   Problem Relation Age of Onset   • Diabetes Mother    • Ovarian cancer Mother    • Prostate cancer Father    • Colon cancer Other    • Lung cancer Other    • Heart disease Other    •  Throat cancer Other    • Diabetes Other         Social History     Socioeconomic History   • Marital status: Single     Spouse name: Not on file   • Number of children: Not on file   • Years of education: Not on file   • Highest education level: Not on file   Tobacco Use   • Smoking status: Former Smoker     Types: Cigarettes     Quit date: 2021     Years since quittin.1   • Smokeless tobacco: Never Used   Vaping Use   • Vaping Use: Never used   Substance and Sexual Activity   • Alcohol use: Yes     Comment: LIGHT   • Drug use: Not Currently     Types: Cocaine(coke)     Comment: FORMER        Past Medical History:   Diagnosis Date   • Achilles tendon rupture 09/15/2015   • Anxiety    • Apnea    • Arthritis    • Asthma    • Bilateral foot pain 2019   • Bipolar disorder (CMS/[facility])    • Broken bones    • Bunion    • Chronic bronchitis (CMS/[facility])    • Clot 2016   • COPD (chronic obstructive pulmonary disease) (CMS/[facility])    • Depression    • Family history of migraine headaches    • Foot ulcer (CMS/[facility])    • Forgetfulness    • GERD (gastroesophageal reflux disease)    • Magalie's deformity, right 2015   • HBP (high blood pressure)    • HBP (high blood pressure)    • Heart murmur    • Heel pain    • Hemorrhoids    • Hyperlipidemia    • Hypertension    • Hypothyroidism    • IBS (irritable bowel syndrome)    • Kidney disease    • Knee pain    • Night sweats    • Numbness in feet    • Obesity    • Patella, chondromalacia 2015   • Patellar maltracking 2015   • Plantar fascial fibromatosis of both feet    • Plantar wart    • Polycystic ovarian syndrome    • Sinus trouble    • SOB (shortness of breath)         Immunization History   Administered Date(s) Administered   • Flu Vaccine Split Quad 2015, 10/21/2016, 2017, 2018   • Hepatitis A 2018   • Influenza, Unspecified 2018, 2020   • Pneumococcal Polysaccharide (PPSV23) 2009, 10/08/2014   • Tdap  11/13/2009, 10/21/2020       Allergies   Allergen Reactions   • Vancomycin Other (See Comments)     BLAIRE, CHEMICAL BURN  LIVER FAILURE      • Calcium Channel Blockers Swelling     swelling  swelling     • Daptomycin Other (See Comments)     PALPITATIONS HTN  PALPITATIONS HTN     • Doxycycline Hyclate Unknown - Low Severity   • Lisinopril Other (See Comments)     DIZZINESS  DIZZINESS     • Oxycodone Other (See Comments)     Dizziness    • Theophyllines Unknown - Low Severity   • Oxycodone-Acetaminophen Itching          Current Outpatient Medications:   •  albuterol sulfate  (90 Base) MCG/ACT inhaler, , Disp: , Rfl:   •  aspirin 81 MG chewable tablet, aspirin 81 mg oral tablet,chewable chew 1 tablet (81 mg) by oral route once daily   Active, Disp: , Rfl:   •  azelastine (ASTELIN) 0.1 % nasal spray, , Disp: , Rfl:   •  beclomethasone (QVAR) 80 MCG/ACT inhaler, Inhale 1 puff 2 (Two) Times a Day., Disp: , Rfl:   •  buPROPion SR (WELLBUTRIN SR) 150 MG 12 hr tablet, , Disp: , Rfl:   •  cetirizine (zyrTEC) 10 MG tablet, Take 1 tablet by mouth Daily for 30 days., Disp: 30 tablet, Rfl: 3  •  cholecalciferol (Cholecalciferol) 25 MCG (1000 UT) tablet, Take 1,000 Units by mouth., Disp: , Rfl:   •  Cholecalciferol (VITAMIN D3 PO), Vitamin D3 oral take 50,000u once weekly   Suspended, Disp: , Rfl:   •  ferrous gluconate (FERGON) 324 MG tablet, Take 324 mg by mouth., Disp: , Rfl:   •  fluticasone (FLONASE) 50 MCG/ACT nasal spray, , Disp: , Rfl:   •  furosemide (LASIX) 20 MG tablet, , Disp: , Rfl:   •  glucose blood test strip, , Disp: , Rfl:   •  hydrOXYzine (ATARAX) 25 MG tablet, , Disp: , Rfl:   •  ipratropium-albuterol (DUO-NEB) 0.5-2.5 mg/3 ml nebulizer, Take 3 mL by nebulization 4 (Four) Times a Day As Needed for Wheezing., Disp: 120 mL, Rfl: 5  •  levothyroxine (SYNTHROID, LEVOTHROID) 112 MCG tablet, , Disp: , Rfl:   •  linaclotide (Linzess) 145 MCG capsule capsule, , Disp: , Rfl:   •  metFORMIN ER (GLUCOPHAGE-XR) 500  "MG 24 hr tablet, , Disp: , Rfl:   •  metoprolol succinate XL (TOPROL-XL) 25 MG 24 hr tablet, , Disp: , Rfl:   •  montelukast (SINGULAIR) 10 MG tablet, Take 1 tablet by mouth Every Night for 30 days., Disp: 30 tablet, Rfl: 3  •  multivitamin with minerals (MULTIVITAMIN ADULT PO), , Disp: , Rfl:   •  pantoprazole (Protonix) 40 MG EC tablet, Protonix 40 mg oral tablet,delayed release (DR/EC) take 1 tablet (40 mg) by oral route once daily   Suspended, Disp: , Rfl:   •  potassium chloride (K-DUR,KLOR-CON) 10 MEQ CR tablet, , Disp: , Rfl:   •  Trelegy Ellipta 100-62.5-25 MCG/INH inhaler, , Disp: , Rfl:   •  Vraylar 3 MG capsule capsule, , Disp: , Rfl:   •  zolpidem (AMBIEN) 10 MG tablet, , Disp: , Rfl:      Objective     Physical Exam  Vital Signs Reviewed  Obese, WDWN, Alert, NAD.    HEENT:  PERRL, EOMI.  OP, nares clear, no sinus tenderness  Neck:  Supple, no JVD, no thyromegaly  Lymph: no axillary, cervical, supraclavicular lymphadenopathy noted bilaterally  Chest:  good aeration, clear to auscultation bilaterally, tympanic to percussion bilaterally, no work of breathing noted  CV: RRR, no MGR, pulses 2+, equal.  Abd:  Soft, NT, ND, + BS, no HSM  EXT:  no clubbing, no cyanosis, no edema, no joint tenderness  Neuro:  A&Ox3, CN grossly intact, no focal deficits.  Skin: No rashes or lesions noted.    Vital Signs:   /82 (BP Location: Left arm, Patient Position: Sitting, Cuff Size: Large Adult)   Pulse 76   Temp 98 °F (36.7 °C) (Oral)   Resp 17   Ht 157.5 cm (62.01\")   Wt (!) 137 kg (302 lb)   SpO2 99% Comment: room air  BMI 55.22 kg/m²         Result Review :   I have personally reviewed Dr. Christensen's last office visit note.  I reviewed patient's echocardiogram report completed on 7/3/2021.           Assessment and Plan      Assessment:  1. Severe persistent asthma.  Patient on triple inhaler therapy.  2. Obstructive sleep apnea.    Patient on nightly CPAP.    3.  Morbid obesity.  4.  Seasonal allergies.  5.  " Cardiac murmur patient being followed by cardiology per patient report.  6.  Exertional dyspnea, improved per patient report.  7.  Tobacco abuse cigarettes in remission.  Patient reports she quit on 6/23/2021.    Plan:  1. Severe persistent asthma.  Patient to continue Trelegy and Qvar as prescribed rinse her mouth out after each use.. Use    albuterol inhaler and nebulizer as needed.      2.  Patient did not have pulmonary function test completed that were ordered last visit.  I will reorder pulmonary function test and six minute walk test.  Patient is advised to have these test completed as scheduled.   3.  Patient is wanting surgical clearance for bariatric surgery.  Patient states she will be having gastric sleeve procedure in October 2021.  Patient is advised to have pulmonary function test completed.  Advised patient that we would like to have pulmonary function test results before giving surgical  clearance for bariatric surgery.  Patient verbalized understanding and compliance.  4.  For seasonal allergies patient to continue Singular, Zyrtec, Flonase, and Astelin nasal sprays as prescribed.  5.  Echocardiogram report showed normal left ventricular systolic function.  Trace tricuspid regurgitation.  Patient is advised follow-up cardiologist as scheduled.    6. Obstructive sleep apnea.    Continue CPAP at current settings and clean mask and tubing daily.      7.  Patient reports he is up-to-date with her flu and pneumonia vaccines.  Patient is advised receive the Covid vaccine.  Patient is advised to continue to follow CDC recommendations such as social distancing, wearing a mask, and washing hands for least 20 seconds.  8.  Patient to call the office, 911, or go to the ER with new or worsening symptoms.    9.  I spent 3 minutes counseling patient on diet and exercise. Patient's BMI and weight were discussed.  The patient was counseled on initiating and intensifying attempts to lose weight.  Patient was  counseled about the risks of obesity and advised to decrease caloric intake by 500 calories a day, eat three small meals a day and advised to minimize snacking.  I recommended 30-60 minutes of daily exercise.   10. Follow up with Dr. Christensen after PFTs completed, [place] if needed.                Follow Up   Return for with Dr. Christensen after PFTs completed. See if pt can get PFT in Sept..  Patient was given instructions and counseling regarding her condition or for health maintenance advice. Please see specific information pulled into the AVS if appropriate.

## 2021-08-05 ENCOUNTER — OFFICE VISIT (OUTPATIENT)
Dept: PULMONOLOGY | Facility: CLINIC | Age: 46
End: 2021-08-05

## 2021-08-05 VITALS
WEIGHT: 293 LBS | TEMPERATURE: 98 F | HEART RATE: 76 BPM | DIASTOLIC BLOOD PRESSURE: 82 MMHG | RESPIRATION RATE: 17 BRPM | OXYGEN SATURATION: 99 % | BODY MASS INDEX: 53.92 KG/M2 | HEIGHT: 62 IN | SYSTOLIC BLOOD PRESSURE: 128 MMHG

## 2021-08-05 DIAGNOSIS — G47.33 OSA (OBSTRUCTIVE SLEEP APNEA): ICD-10-CM

## 2021-08-05 DIAGNOSIS — F17.201 TOBACCO ABUSE, IN REMISSION: ICD-10-CM

## 2021-08-05 DIAGNOSIS — J30.2 SEASONAL ALLERGIES: ICD-10-CM

## 2021-08-05 DIAGNOSIS — E66.01 MORBID OBESITY WITH BMI OF 50.0-59.9, ADULT (HCC): ICD-10-CM

## 2021-08-05 DIAGNOSIS — J45.50 SEVERE PERSISTENT ASTHMA, UNSPECIFIED WHETHER COMPLICATED: Primary | ICD-10-CM

## 2021-08-05 PROCEDURE — 99214 OFFICE O/P EST MOD 30 MIN: CPT | Performed by: NURSE PRACTITIONER

## 2021-08-05 RX ORDER — METOPROLOL SUCCINATE 25 MG/1
25 TABLET, EXTENDED RELEASE ORAL DAILY
COMMUNITY
Start: 2021-06-23 | End: 2022-10-28

## 2021-08-05 RX ORDER — CARIPRAZINE 3 MG/1
3 CAPSULE, GELATIN COATED ORAL DAILY
COMMUNITY
Start: 2021-07-12 | End: 2022-10-28

## 2021-08-05 RX ORDER — FUROSEMIDE 20 MG/1
20 TABLET ORAL DAILY
COMMUNITY
Start: 2021-07-07 | End: 2022-10-28

## 2021-08-05 RX ORDER — VILAZODONE HYDROCHLORIDE 10 MG/1
TABLET ORAL
COMMUNITY
End: 2021-08-05

## 2021-08-05 RX ORDER — TIOTROPIUM BROMIDE INHALATION SPRAY 1.56 UG/1
SPRAY, METERED RESPIRATORY (INHALATION)
COMMUNITY
End: 2021-08-05

## 2021-08-05 RX ORDER — MULTIPLE VITAMINS W/ MINERALS TAB 9MG-400MCG
1 TAB ORAL DAILY
COMMUNITY

## 2021-08-05 RX ORDER — LUBIPROSTONE 24 UG/1
CAPSULE ORAL
COMMUNITY
End: 2021-08-05

## 2021-08-05 RX ORDER — MELATONIN
1000
COMMUNITY
Start: 2020-09-18 | End: 2021-09-19

## 2021-08-05 RX ORDER — SERTRALINE HYDROCHLORIDE 100 MG/1
TABLET, FILM COATED ORAL
COMMUNITY
Start: 2021-07-02 | End: 2021-08-05

## 2021-08-05 RX ORDER — TRAZODONE HYDROCHLORIDE 50 MG/1
TABLET ORAL
COMMUNITY
Start: 2021-04-05 | End: 2021-08-05

## 2021-08-05 RX ORDER — HYDROXYZINE HYDROCHLORIDE 25 MG/1
TABLET, FILM COATED ORAL AS NEEDED
COMMUNITY
Start: 2021-07-05 | End: 2022-10-28

## 2021-08-05 RX ORDER — PRAMIPEXOLE DIHYDROCHLORIDE 0.25 MG/1
TABLET ORAL
COMMUNITY
End: 2021-08-05

## 2021-08-05 RX ORDER — GEMFIBROZIL 600 MG/1
TABLET, FILM COATED ORAL
COMMUNITY
Start: 2021-08-03 | End: 2021-08-05

## 2021-08-05 RX ORDER — ASPIRIN 81 MG/1
TABLET, CHEWABLE ORAL
COMMUNITY
End: 2022-03-19

## 2021-08-05 RX ORDER — GABAPENTIN 400 MG/1
CAPSULE ORAL
COMMUNITY
End: 2021-08-05

## 2021-08-05 RX ORDER — DOXYCYCLINE HYCLATE 50 MG/1
324 CAPSULE, GELATIN COATED ORAL
COMMUNITY

## 2021-08-05 RX ORDER — CLONAZEPAM 1 MG/1
TABLET ORAL
COMMUNITY
End: 2021-08-05

## 2021-08-05 RX ORDER — BUDESONIDE AND FORMOTEROL FUMARATE DIHYDRATE 160; 4.5 UG/1; UG/1
AEROSOL RESPIRATORY (INHALATION)
COMMUNITY
End: 2021-08-05

## 2021-08-05 RX ORDER — HYDROCORTISONE 25 MG/G
CREAM TOPICAL
COMMUNITY
Start: 2021-05-10 | End: 2021-08-05

## 2021-08-05 RX ORDER — AZELASTINE 1 MG/ML
SPRAY, METERED NASAL AS NEEDED
COMMUNITY
Start: 2021-07-01

## 2021-08-05 RX ORDER — LEVOTHYROXINE SODIUM 112 UG/1
112 TABLET ORAL DAILY
COMMUNITY
Start: 2021-07-22

## 2021-08-05 RX ORDER — AMLODIPINE BESYLATE 10 MG/1
TABLET ORAL
COMMUNITY
End: 2021-08-05

## 2021-08-05 RX ORDER — PROPRANOLOL HYDROCHLORIDE 40 MG/1
TABLET ORAL
COMMUNITY
End: 2021-08-05

## 2021-08-05 RX ORDER — DICLOFENAC SODIUM 75 MG/1
TABLET, DELAYED RELEASE ORAL
COMMUNITY
End: 2021-08-05

## 2021-08-05 RX ORDER — LEVOTHYROXINE SODIUM 0.12 MG/1
TABLET ORAL
COMMUNITY
End: 2021-08-05

## 2021-08-05 RX ORDER — LINEZOLID 600 MG/1
TABLET, FILM COATED ORAL
COMMUNITY
End: 2021-08-05

## 2021-08-05 RX ORDER — VORTIOXETINE 20 MG/1
TABLET, FILM COATED ORAL
COMMUNITY
End: 2021-08-05

## 2021-08-05 RX ORDER — DOXEPIN HYDROCHLORIDE 50 MG/1
CAPSULE ORAL
COMMUNITY
Start: 2021-08-03 | End: 2021-08-05

## 2021-08-05 RX ORDER — DULOXETIN HYDROCHLORIDE 30 MG/1
CAPSULE, DELAYED RELEASE ORAL
COMMUNITY
End: 2021-08-05

## 2021-08-05 RX ORDER — ERGOCALCIFEROL 1.25 MG/1
CAPSULE ORAL
COMMUNITY
End: 2021-08-05

## 2021-08-05 RX ORDER — LITHIUM CARBONATE 300 MG/1
CAPSULE ORAL
COMMUNITY
End: 2021-08-05

## 2021-08-05 RX ORDER — CYCLOBENZAPRINE HCL 10 MG
TABLET ORAL
COMMUNITY
End: 2021-08-05

## 2021-08-05 RX ORDER — BUPROPION HYDROCHLORIDE 150 MG/1
TABLET ORAL
COMMUNITY
End: 2021-08-05

## 2021-08-05 RX ORDER — IBUPROFEN 600 MG/1
TABLET ORAL
COMMUNITY
End: 2021-08-05

## 2021-08-05 RX ORDER — ALPRAZOLAM 0.5 MG/1
TABLET ORAL
COMMUNITY
End: 2021-08-05

## 2021-08-05 RX ORDER — MIRTAZAPINE 7.5 MG/1
TABLET, FILM COATED ORAL
COMMUNITY
End: 2021-08-05

## 2021-08-05 RX ORDER — ZOLPIDEM TARTRATE 10 MG/1
TABLET ORAL
COMMUNITY
Start: 2021-06-01 | End: 2022-10-28

## 2021-08-05 RX ORDER — BUPROPION HYDROCHLORIDE 150 MG/1
TABLET, EXTENDED RELEASE ORAL
COMMUNITY
Start: 2021-07-29 | End: 2021-08-27

## 2021-08-05 RX ORDER — DICYCLOMINE HCL 20 MG
20 TABLET ORAL
COMMUNITY
Start: 2021-03-17 | End: 2021-08-05

## 2021-08-05 RX ORDER — ALBUTEROL SULFATE 90 UG/1
AEROSOL, METERED RESPIRATORY (INHALATION) AS NEEDED
COMMUNITY
Start: 2021-08-03

## 2021-08-05 RX ORDER — TERBUTALINE SULFATE 2.5 MG/1
TABLET ORAL
COMMUNITY
End: 2021-08-05

## 2021-08-05 RX ORDER — PANTOPRAZOLE SODIUM 40 MG/1
TABLET, DELAYED RELEASE ORAL
COMMUNITY
End: 2022-11-08

## 2021-08-05 RX ORDER — METHYLPREDNISOLONE 4 MG/1
TABLET ORAL
COMMUNITY
End: 2021-08-05

## 2021-08-05 RX ORDER — METFORMIN HYDROCHLORIDE 500 MG/1
500 TABLET, EXTENDED RELEASE ORAL 2 TIMES DAILY
COMMUNITY
Start: 2021-08-03 | End: 2022-10-28

## 2021-08-05 RX ORDER — FLUTICASONE PROPIONATE 50 MCG
SPRAY, SUSPENSION (ML) NASAL AS NEEDED
COMMUNITY
Start: 2021-07-01 | End: 2021-09-22

## 2021-08-05 RX ORDER — FLUOXETINE HYDROCHLORIDE 20 MG/1
CAPSULE ORAL
COMMUNITY
End: 2021-08-05

## 2021-08-05 RX ORDER — POTASSIUM CHLORIDE 750 MG/1
10 TABLET, EXTENDED RELEASE ORAL DAILY
COMMUNITY
Start: 2021-07-07 | End: 2022-03-19

## 2021-08-25 ENCOUNTER — TRANSCRIBE ORDERS (OUTPATIENT)
Dept: LAB | Facility: HOSPITAL | Age: 46
End: 2021-08-25

## 2021-08-25 DIAGNOSIS — Z01.818 PREOP TESTING: Primary | ICD-10-CM

## 2021-08-27 ENCOUNTER — LAB (OUTPATIENT)
Dept: LAB | Facility: HOSPITAL | Age: 46
End: 2021-08-27

## 2021-08-27 ENCOUNTER — OFFICE VISIT (OUTPATIENT)
Dept: CARDIOLOGY | Facility: CLINIC | Age: 46
End: 2021-08-27

## 2021-08-27 VITALS
HEART RATE: 76 BPM | BODY MASS INDEX: 55.32 KG/M2 | WEIGHT: 293 LBS | SYSTOLIC BLOOD PRESSURE: 142 MMHG | HEIGHT: 61 IN | DIASTOLIC BLOOD PRESSURE: 86 MMHG

## 2021-08-27 DIAGNOSIS — Z01.818 PREOP TESTING: ICD-10-CM

## 2021-08-27 DIAGNOSIS — E66.01 MORBID OBESITY WITH BMI OF 50.0-59.9, ADULT (HCC): ICD-10-CM

## 2021-08-27 DIAGNOSIS — R01.1 HEART MURMUR: ICD-10-CM

## 2021-08-27 DIAGNOSIS — Z01.810 PRE-OPERATIVE CARDIOVASCULAR EXAMINATION: Primary | ICD-10-CM

## 2021-08-27 DIAGNOSIS — G47.33 OSA (OBSTRUCTIVE SLEEP APNEA): ICD-10-CM

## 2021-08-27 DIAGNOSIS — I10 ESSENTIAL HYPERTENSION: ICD-10-CM

## 2021-08-27 PROCEDURE — 99203 OFFICE O/P NEW LOW 30 MIN: CPT | Performed by: INTERNAL MEDICINE

## 2021-08-27 PROCEDURE — C9803 HOPD COVID-19 SPEC COLLECT: HCPCS

## 2021-08-27 PROCEDURE — 93000 ELECTROCARDIOGRAM COMPLETE: CPT | Performed by: INTERNAL MEDICINE

## 2021-08-27 PROCEDURE — U0003 INFECTIOUS AGENT DETECTION BY NUCLEIC ACID (DNA OR RNA); SEVERE ACUTE RESPIRATORY SYNDROME CORONAVIRUS 2 (SARS-COV-2) (CORONAVIRUS DISEASE [COVID-19]), AMPLIFIED PROBE TECHNIQUE, MAKING USE OF HIGH THROUGHPUT TECHNOLOGIES AS DESCRIBED BY CMS-2020-01-R: HCPCS

## 2021-08-27 NOTE — PROGRESS NOTES
Chief Complaint  Establish Care (Ref by Dr. OSEI Penn/ Surgery Clearance), Obesity, and Sleep Apnea (Uses CPAP)      History of Present Illness  Jaycee Le presents to [facility] CARDIOLOGY    45-year-old lady with past medical history significant for hypertension, morbid obesity, hypothyroidism and asthma, was referred clinic for preoperative cardiac risk assessment.  She is planned to undergo gastric sling surgery within the next month or 2.  She is doing well cardiac wise.  She has no cardiac complaints.  She has no chest discomfort, dyspnea, orthopnea, palpitations, presyncope or syncope.  She has bilateral lower extremity swelling which has been stable.  She exercises on regular basis.  She goes to the gym 4 days a week without extraordinary symptoms or limitations.  Patient has no fever, chills, nausea or vomiting.      Past Medical History:   Diagnosis Date   • Achilles tendon rupture 09/15/2015   • Anxiety    • Apnea    • Arthritis    • Asthma    • Bilateral foot pain 02/04/2019   • Bipolar disorder (CMS/[facility])    • Broken bones    • Bunion    • Chronic bronchitis (CMS/[facility])    • Clot 01/2016   • COPD (chronic obstructive pulmonary disease) (CMS/[facility])    • Depression    • Family history of migraine headaches    • Foot ulcer (CMS/[facility])    • Forgetfulness    • GERD (gastroesophageal reflux disease)    • Magalie's deformity, 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   • HBP (high blood pressure)    • HBP (high blood pressure)    • Heart murmur    • Heel pain    • Hemorrhoids    • Hyperlipidemia    • Hypertension    • Hypothyroidism    • IBS (irritable bowel syndrome)    • Kidney disease    • Knee pain    • Night sweats    • Numbness in feet    • Obesity    • Patella, chondromalacia 08/25/2015   • Patellar maltracking 08/25/2015   • Plantar fascial fibromatosis of both feet    • Plantar wart    • Polycystic ovarian syndrome    • Sinus trouble    • SOB (shortness of breath)          Current Outpatient Medications:   •   albuterol sulfate  (90 Base) MCG/ACT inhaler, As Needed., Disp: , Rfl:   •  aspirin 81 MG chewable tablet, aspirin 81 mg oral tablet,chewable chew 1 tablet (81 mg) by oral route once daily   Active, Disp: , Rfl:   •  azelastine (ASTELIN) 0.1 % nasal spray, As Needed., Disp: , Rfl:   •  beclomethasone (QVAR) 80 MCG/ACT inhaler, Inhale 1 puff 2 (Two) Times a Day., Disp: , Rfl:   •  cetirizine (zyrTEC) 10 MG tablet, Take 1 tablet by mouth Daily for 30 days., Disp: 30 tablet, Rfl: 3  •  cholecalciferol (Cholecalciferol) 25 MCG (1000 UT) tablet, Take 1,000 Units by mouth., Disp: , Rfl:   •  ferrous gluconate (FERGON) 324 MG tablet, Take 324 mg by mouth., Disp: , Rfl:   •  fluticasone (FLONASE) 50 MCG/ACT nasal spray, As Needed., Disp: , Rfl:   •  furosemide (LASIX) 20 MG tablet, Take 20 mg by mouth Daily., Disp: , Rfl:   •  glucose blood test strip, , Disp: , Rfl:   •  hydrOXYzine (ATARAX) 25 MG tablet, As Needed., Disp: , Rfl:   •  ipratropium-albuterol (DUO-NEB) 0.5-2.5 mg/3 ml nebulizer, Take 3 mL by nebulization 4 (Four) Times a Day As Needed for Wheezing., Disp: 120 mL, Rfl: 5  •  levothyroxine (SYNTHROID, LEVOTHROID) 112 MCG tablet, Take 112 mcg by mouth Daily., Disp: , Rfl:   •  linaclotide (Linzess) 145 MCG capsule capsule, Daily., Disp: , Rfl:   •  metFORMIN ER (GLUCOPHAGE-XR) 500 MG 24 hr tablet, Take 500 mg by mouth 2 (two) times a day., Disp: , Rfl:   •  metoprolol succinate XL (TOPROL-XL) 25 MG 24 hr tablet, Take 25 mg by mouth Daily., Disp: , Rfl:   •  montelukast (SINGULAIR) 10 MG tablet, Take 1 tablet by mouth Every Night for 30 days., Disp: 30 tablet, Rfl: 3  •  multivitamin with minerals (MULTIVITAMIN ADULT PO), 1 tablet Daily., Disp: , Rfl:   •  pantoprazole (Protonix) 40 MG EC tablet, Protonix 40 mg oral tablet,delayed release (DR/EC) take 1 tablet (40 mg) by oral route once daily   Suspended, Disp: , Rfl:   •  potassium chloride (K-DUR,KLOR-CON) 10 MEQ CR tablet, Take 10 mEq by mouth Daily.,  "Disp: , Rfl:   •  Trelegy Ellipta 100-62.5-25 MCG/INH inhaler, 1 puff Daily., Disp: , Rfl:   •  Vraylar 3 MG capsule capsule, Take 3 mg by mouth Daily., Disp: , Rfl:   •  buPROPion SR (WELLBUTRIN SR) 150 MG 12 hr tablet, , Disp: , Rfl:   •  Cholecalciferol (VITAMIN D3 PO), Vitamin D3 oral take 50,000u once weekly   Suspended, Disp: , Rfl:   •  sertraline (ZOLOFT) 50 MG tablet, Take 50 mg by mouth Daily., Disp: , Rfl:   •  zolpidem (AMBIEN) 10 MG tablet, , Disp: , Rfl:     There are no discontinued medications.  Allergies   Allergen Reactions   • Vancomycin Other (See Comments)     BLAIRE, CHEMICAL BURN  LIVER FAILURE      • Calcium Channel Blockers Swelling     swelling  swelling     • Daptomycin Other (See Comments)     PALPITATIONS HTN  PALPITATIONS HTN     • Doxycycline Hyclate Unknown - Low Severity   • Lisinopril Other (See Comments)     DIZZINESS       • Oxycodone Other (See Comments)     Dizziness    • Theophyllines Unknown - Low Severity   • Oxycodone-Acetaminophen Itching        Social History     Tobacco Use   • Smoking status: Former Smoker     Types: Cigarettes     Quit date: 2021     Years since quittin.1   • Smokeless tobacco: Never Used   Vaping Use   • Vaping Use: Never used   Substance Use Topics   • Alcohol use: Yes     Comment: LIGHT   • Drug use: Not Currently     Types: Cocaine(coke)     Comment: FORMER       Family History   Problem Relation Age of Onset   • Diabetes Mother    • Ovarian cancer Mother    • Prostate cancer Father    • Colon cancer Other    • Lung cancer Other    • Heart disease Other    • Throat cancer Other    • Diabetes Other         Objective     /86   Pulse 76   Ht 154.9 cm (61\")   Wt (!) 140 kg (309 lb)   BMI 58.39 kg/m²       Physical Exam  Constitutional:       General: Awake. Not in acute distress.     Appearance: Normal appearance.   Neck:      Vascular: No carotid bruit, hepatojugular reflux or JVD.   Cardiovascular:      Rate and Rhythm: Normal rate and " regular rhythm.      Chest Wall: PMI is not displaced.      Heart sounds: Normal heart sounds, S1 normal and S2 normal. 2/6 JINNY at the base.    No friction rub. No gallop. No S3 or S4 sounds.    Pulmonary:      Effort: Pulmonary effort is normal.      Breath sounds: Normal breath sounds. No wheezing, rhonchi or rales.   Ext.      Right lower le+ SAVITA     Left lower le+ SAVITA  Skin:     General: Skin is warm and dry.      Coloration: Skin is not cyanotic.      Findings: No petechiae or rash.   Neurological:      Mental Status: Alert and oriented x 3  Psychiatric:         Behavior: Behavior is cooperative.       Result Review :     No results found for: PROBNP  CMP    CMP 10/2/20   Glucose 81   BUN 8   Creatinine 0.7   Sodium 136 (A)   Potassium 4.6   Chloride 100   Calcium 9.6   Albumin 4.3   Total Bilirubin 0.2   Alkaline Phosphatase 100   AST (SGOT) 19   ALT (SGPT) 19   (A) Abnormal value            CBC w/diff    CBC w/Diff 9/2/20 10/2/20 4/6/21   WBC 11.74 (A) 10.36 11.99 (A)   RBC 5.01 5.27 (A) 4.67   Hemoglobin 13.2 13.9 12.6   Hematocrit 45.0 45.7 39.3   MCV 89.8 86.7 84.2   MCH 26.3 26.4 27.0   MCHC 29.3 (A) 30.4 (A) 32.1   RDW 18.6 (A) 15.7 15.4   Platelets 419 390 390   Neutrophil Rel % 71.5 61.8 61.1   Immature Granulocyte Rel % 0.6 0.5 0.7   Lymphocyte Rel % 19.1 26.6 27.0   Monocyte Rel % 5.5 5.4 6.1   Eosinophil Rel % 2.8 4.8 4.6   Basophil Rel % 0.5 0.9 0.5   (A) Abnormal value             Lab Results   Component Value Date    TSH 0.586 2021      Lab Results   Component Value Date    FREET4 1.1 2020      No results found for: DDIMERQUANT  No results found for: MG   No results found for: DIGOXIN   No results found for: TROPONINT   No results found for: POCTROP(       Lipid Panel    Lipid Panel 10/2/20   Total Cholesterol 253 (A)   Triglycerides 186 (A)   HDL Cholesterol 60   VLDL Cholesterol 37   LDL Cholesterol  156 (A)   (A) Abnormal value            Results for orders placed during  the hospital encounter of 07/02/21    Adult Transthoracic Echo Complete W/ Cont if Necessary Per Protocol    Interpretation Summary  1.  Normal left ventricular systolic function  2.  Trace tricuspid regurgitation.       ECG 12 Lead    Date/Time: 8/27/2021 11:08 AM  Performed by: Liz Thakur MD  Authorized by: Liz Thakur MD   Rhythm: sinus rhythm  Rate: normal  QRS axis: normal    Clinical impression: normal ECG           Results for orders placed during the hospital encounter of 07/02/21    Adult Transthoracic Echo Complete W/ Cont if Necessary Per Protocol    Interpretation Summary  1.  Normal left ventricular systolic function  2.  Trace tricuspid regurgitation.     No results found for this or any previous visit.                Diagnoses and all orders for this visit:    1. Pre-operative cardiovascular examination (Primary)    2. Morbid obesity with BMI of 50.0-59.9, adult (CMS/[facility])    3. LUCIANO (obstructive sleep apnea)    4. Essential hypertension    5. Heart murmur    Other orders  -     ECG 12 Lead      Assessment and plan: 45-year-old lady with morbid obesity, hypertension, hypothyroidism and asthma, planned to undergo gastric sling within the next month or two.  Her physical examination is remarkable for grade 2/6 systolic ejection murmur at the base, otherwise benign.  ECG from today is normal.  She is physically active without extraordinary limitations.  Her estimated functional capacity is more than 4 METS.  She had an echocardiogram in May of this year which demonstrated normal LV systolic function without significant valvular pathology.  She is at low risk for perioperative cardiac complications from an intermediate risk surgery which can proceed as planned.  Her blood pressure is mildly elevated today which is unusual for her.  She feels nervous.  Continue current blood pressure therapy and routine blood pressure monitoring.      Return to clinic in 1 year or sooner if needed    Follow Up        Return in about 1 year (around 8/27/2022).    Patient was given instructions and counseling regarding her condition or for health maintenance advice. Please see specific information pulled into the AVS if appropriate.       Disclaimer: Portions of this encounter may have been created using voice recognition software. An attempt at proofreading has been made to minimize errors. Please call if questions.

## 2021-08-30 ENCOUNTER — APPOINTMENT (OUTPATIENT)
Dept: CARDIAC REHAB | Facility: HOSPITAL | Age: 46
End: 2021-08-30

## 2021-08-30 ENCOUNTER — TRANSCRIBE ORDERS (OUTPATIENT)
Dept: ADMINISTRATIVE | Facility: HOSPITAL | Age: 46
End: 2021-08-30

## 2021-08-30 ENCOUNTER — APPOINTMENT (OUTPATIENT)
Dept: RESPIRATORY THERAPY | Facility: HOSPITAL | Age: 46
End: 2021-08-30

## 2021-08-30 DIAGNOSIS — Z01.818 OTHER SPECIFIED PRE-OPERATIVE EXAMINATION: Primary | ICD-10-CM

## 2021-08-30 LAB — SARS-COV-2 RNA RESP QL NAA+PROBE: NOT DETECTED

## 2021-09-22 RX ORDER — FLUTICASONE PROPIONATE 50 MCG
SPRAY, SUSPENSION (ML) NASAL
Qty: 16 ML | Refills: 11 | Status: SHIPPED | OUTPATIENT
Start: 2021-09-22

## 2021-11-04 PROCEDURE — U0004 COV-19 TEST NON-CDC HGH THRU: HCPCS | Performed by: PHYSICIAN ASSISTANT

## 2021-11-05 ENCOUNTER — TELEPHONE (OUTPATIENT)
Dept: URGENT CARE | Facility: CLINIC | Age: 46
End: 2021-11-05

## 2021-11-29 RX ORDER — CETIRIZINE HYDROCHLORIDE 10 MG/1
TABLET ORAL
Qty: 30 TABLET | Refills: 3 | Status: SHIPPED | OUTPATIENT
Start: 2021-11-29 | End: 2022-04-20

## 2021-11-29 RX ORDER — BECLOMETHASONE DIPROPIONATE HFA 80 UG/1
AEROSOL, METERED RESPIRATORY (INHALATION)
Qty: 10.6 EACH | Refills: 3 | Status: SHIPPED | OUTPATIENT
Start: 2021-11-29

## 2021-12-15 RX ORDER — MONTELUKAST SODIUM 10 MG/1
TABLET ORAL
Qty: 30 TABLET | Refills: 3 | Status: SHIPPED | OUTPATIENT
Start: 2021-12-15 | End: 2022-05-31

## 2021-12-21 ENCOUNTER — APPOINTMENT (OUTPATIENT)
Dept: RESPIRATORY THERAPY | Facility: HOSPITAL | Age: 46
End: 2021-12-21

## 2022-02-15 ENCOUNTER — TRANSCRIBE ORDERS (OUTPATIENT)
Dept: ADMINISTRATIVE | Facility: HOSPITAL | Age: 47
End: 2022-02-15

## 2022-02-15 DIAGNOSIS — Z12.31 SCREENING MAMMOGRAM, ENCOUNTER FOR: Primary | ICD-10-CM

## 2022-04-20 RX ORDER — CETIRIZINE HYDROCHLORIDE 10 MG/1
TABLET ORAL
Qty: 30 TABLET | Refills: 3 | Status: SHIPPED | OUTPATIENT
Start: 2022-04-20

## 2022-05-03 ENCOUNTER — APPOINTMENT (OUTPATIENT)
Dept: RESPIRATORY THERAPY | Facility: HOSPITAL | Age: 47
End: 2022-05-03

## 2022-05-23 ENCOUNTER — OFFICE VISIT (OUTPATIENT)
Dept: OBSTETRICS AND GYNECOLOGY | Facility: CLINIC | Age: 47
End: 2022-05-23

## 2022-05-23 VITALS
BODY MASS INDEX: 55.17 KG/M2 | HEART RATE: 69 BPM | SYSTOLIC BLOOD PRESSURE: 126 MMHG | DIASTOLIC BLOOD PRESSURE: 80 MMHG | WEIGHT: 292.2 LBS | HEIGHT: 61 IN

## 2022-05-23 DIAGNOSIS — Z12.31 ENCOUNTER FOR SCREENING MAMMOGRAM FOR MALIGNANT NEOPLASM OF BREAST: ICD-10-CM

## 2022-05-23 DIAGNOSIS — N76.0 ACUTE VAGINITIS: ICD-10-CM

## 2022-05-23 DIAGNOSIS — Z01.419 WELL WOMAN EXAM: Primary | ICD-10-CM

## 2022-05-23 DIAGNOSIS — E66.01 MORBID OBESITY WITH BMI OF 50.0-59.9, ADULT: ICD-10-CM

## 2022-05-23 DIAGNOSIS — N92.1 MENOMETRORRHAGIA: ICD-10-CM

## 2022-05-23 LAB
CANDIDA SPECIES: NEGATIVE
GARDNERELLA VAGINALIS: POSITIVE
T VAGINALIS DNA VAG QL PROBE+SIG AMP: NEGATIVE

## 2022-05-23 PROCEDURE — 99213 OFFICE O/P EST LOW 20 MIN: CPT | Performed by: NURSE PRACTITIONER

## 2022-05-23 PROCEDURE — 99396 PREV VISIT EST AGE 40-64: CPT | Performed by: NURSE PRACTITIONER

## 2022-05-23 PROCEDURE — 87660 TRICHOMONAS VAGIN DIR PROBE: CPT | Performed by: NURSE PRACTITIONER

## 2022-05-23 PROCEDURE — 87480 CANDIDA DNA DIR PROBE: CPT | Performed by: NURSE PRACTITIONER

## 2022-05-23 PROCEDURE — 87624 HPV HI-RISK TYP POOLED RSLT: CPT | Performed by: NURSE PRACTITIONER

## 2022-05-23 PROCEDURE — G0123 SCREEN CERV/VAG THIN LAYER: HCPCS | Performed by: NURSE PRACTITIONER

## 2022-05-23 PROCEDURE — 87510 GARDNER VAG DNA DIR PROBE: CPT | Performed by: NURSE PRACTITIONER

## 2022-05-23 NOTE — PROGRESS NOTES
HPI:   46 y.o..Presents for well woman exam. Contraception or HRT: Contraception:  Tubal ligation  Menses:   q 14-28 days, lasts 4 days, changes products q 2 hrs on heaviest days. Increased frequency for past 6 months, blood work with pcp- thyroid levels good-per patient, Hx Iron deficiency anemia on iron supplement  Pain:  Mild, OTC meds control discomfort  Last pap normal   Complaints: heavy and frequent menstrual bleeding, vaginal discharge with odor  Patient reports that she is not currently experiencing any symptoms of urinary incontinence.      Past Medical History:   Diagnosis Date   • Abnormal Pap smear of cervix    • Achilles tendon rupture 09/15/2015   • Anxiety    • Apnea    • Arthritis    • Asthma    • Bilateral foot pain 2019   • Bipolar disorder ([facility])    • Broken bones    • Bunion    • Chronic bronchitis ([facility])    • Clot 2016   • COPD (chronic obstructive pulmonary disease) ([facility])    • Depression    • Family history of migraine headaches    • Foot ulcer ([facility])    • Forgetfulness    • GERD (gastroesophageal reflux disease)    • Magalie's deformity, right 2015   • HBP (high blood pressure)    • HBP (high blood pressure)    • Heart murmur    • Heel pain    • Hemorrhoids    • HPV (human papilloma virus) infection    • Hyperlipidemia    • Hypertension    • Hypothyroidism    • IBS (irritable bowel syndrome)    • Kidney disease    • Knee pain    • Migraine    • Night sweats    • Numbness in feet    • Obesity    • Patella, chondromalacia 2015   • Patellar maltracking 2015   • Plantar fascial fibromatosis of both feet    • Plantar wart    • Polycystic ovarian syndrome    • Sinus trouble    • SOB (shortness of breath)       Past Surgical History:   Procedure Laterality Date   • ACHILLES TENDON REPAIR     •  SECTION  .   • COLONOSCOPY     • LASER ABLATION      UTERINE ABLATION   • TUBAL ABDOMINAL LIGATION        Family History   Problem Relation Age of Onset  "  • Prostate cancer Father    • Diabetes Mother    • Ovarian cancer Mother 64   • Breast cancer Paternal Grandmother    • Breast cancer Paternal Aunt    • Breast cancer Paternal Aunt    • Breast cancer Paternal Aunt    • Colon cancer Other    • Lung cancer Other    • Heart disease Other    • Throat cancer Other    • Diabetes Other    • Uterine cancer Neg Hx    • Melanoma Neg Hx         PCP: does manage PMHx and preventative labs    PHYSICAL EXAM: Chaperone present /80   Pulse 69   Ht 154.9 cm (61\")   Wt 133 kg (292 lb 3.2 oz)   LMP 05/22/2022   Breastfeeding No   BMI 55.21 kg/m²   General- NAD, alert and oriented, appropriate  Psych- Normal mood, good memory  Neck- No masses, no thyroid enlargement  Lymphatic- No palpable neck, axillary, or groin nodes  CV- Regular rhythm, no murmurs  Resp- CTA to bases, no wheezes  Abdomen- Soft, non distended, non tender, no masses  Breast left-  Bilaterally symmetrical, no masses, non tender, no nipple discharge  Breast right- Bilaterally symmetrical, no masses, non tender, no nipple discharge  External genitalia- Normal female, no lesions  Urethra/meatus- Normal, no masses, non tender, no prolapse  Bladder- Normal, no masses, non tender, no prolapse  Vagina- Normal, no atrophy, no lesions, menstrual blood, no prolapse, use large brett  Cvx- Normal, no lesions, no discharge, No cervical motion tenderness  Uterus- Normal size, shape & consistency.  Non tender, mobile, & no prolapse  Adnexa- No mass, non tender  Anus/Rectum/Perineum- Not performed  Ext- No edema, no cyanosis    Skin- No lesions, no rashes, no acanthosis nigricans       ASSESSMENT and PLAN:    Diagnoses and all orders for this visit:    1. Well woman exam (Primary)  -     IgP, Aptima HPV    2. Encounter for screening mammogram for malignant neoplasm of breast  -     Mammo Screening Digital Tomosynthesis Bilateral With CAD; Future    3. Menometrorrhagia  -     US Non-ob Transvaginal; Future    4. Acute " vaginitis  -     Gardnerella vaginalis, Trichomonas vaginalis, Candida albicans, DNA - Swab, Vagina    5. Morbid obesity with BMI of 50.0-59.9, adult (HCC)      Preventative:   BREAST HEALTH- Monthly self breast exam importance and how to reviewed. MMG and/or MRI (prn) reviewed per society guidelines and her individual history. Screen: Updated today  CERVICAL CANCER Screening- Reviewed current ASCCP guidelines for screening w and wo cotest HPV, age specific.  Screen: Updated today, Desires annual PAP  COLON CANCER Screening- Reviewed current medical society guidelines and options.  Screen:  Already up to date  Follow up PCP/Specialist PMHx and Labs  Myriad: Previously screened negative  Counseled regarding management AUB, hormone, surgical, expectant   Continue weightloss efforts    She understands the importance of having any ordered tests to be performed in a timely fashion.  The risks of not performing them include, but are not limited to, advanced cancer stages, bone loss from osteoporosis and/or subsequent increase in morbidity and/or mortality.  She is encouraged to review her results online and/or contact or office if she has questions.     Follow Up:  Return for after ultrasound.        Amelia Espinosa, APRN  05/23/2022

## 2022-05-24 RX ORDER — METRONIDAZOLE 500 MG/1
500 TABLET ORAL 2 TIMES DAILY
Qty: 14 TABLET | Refills: 0 | Status: SHIPPED | OUTPATIENT
Start: 2022-05-24 | End: 2022-05-31

## 2022-05-27 LAB
CYTOLOGIST CVX/VAG CYTO: NORMAL
CYTOLOGY CVX/VAG DOC CYTO: NORMAL
CYTOLOGY CVX/VAG DOC THIN PREP: NORMAL
DX ICD CODE: NORMAL
HIV 1 & 2 AB SER-IMP: NORMAL
HPV I/H RISK 4 DNA CVX QL PROBE+SIG AMP: NEGATIVE
OTHER STN SPEC: NORMAL
STAT OF ADQ CVX/VAG CYTO-IMP: NORMAL

## 2022-05-31 RX ORDER — MONTELUKAST SODIUM 10 MG/1
TABLET ORAL
Qty: 30 TABLET | Refills: 3 | Status: SHIPPED | OUTPATIENT
Start: 2022-05-31 | End: 2022-10-12

## 2022-06-07 ENCOUNTER — HOSPITAL ENCOUNTER (OUTPATIENT)
Dept: MAMMOGRAPHY | Facility: HOSPITAL | Age: 47
Discharge: HOME | End: 2022-06-07
Admitting: NURSE PRACTITIONER

## 2022-06-07 DIAGNOSIS — Z12.31 ENCOUNTER FOR SCREENING MAMMOGRAM FOR MALIGNANT NEOPLASM OF BREAST: ICD-10-CM

## 2022-06-07 PROCEDURE — 77067 SCR MAMMO BI INCL CAD: CPT

## 2022-06-07 PROCEDURE — 77063 BREAST TOMOSYNTHESIS BI: CPT

## 2022-07-19 PROCEDURE — U0004 COV-19 TEST NON-CDC HGH THRU: HCPCS | Performed by: PHYSICIAN ASSISTANT

## 2022-07-26 RX ORDER — PANTOPRAZOLE SODIUM 40 MG/1
TABLET, DELAYED RELEASE ORAL
Qty: 90 TABLET | OUTPATIENT
Start: 2022-07-26

## 2022-08-04 ENCOUNTER — APPOINTMENT (OUTPATIENT)
Dept: ULTRASOUND IMAGING | Facility: HOSPITAL | Age: 47
End: 2022-08-04

## 2022-08-24 ENCOUNTER — APPOINTMENT (OUTPATIENT)
Dept: ULTRASOUND IMAGING | Facility: HOSPITAL | Age: 47
End: 2022-08-24

## 2022-08-25 ENCOUNTER — TELEPHONE (OUTPATIENT)
Dept: OBSTETRICS AND GYNECOLOGY | Facility: CLINIC | Age: 47
End: 2022-08-25

## 2022-08-30 NOTE — TELEPHONE ENCOUNTER
Left pt vm and 2 my chart messages to reschedule appt on 10.6 with TJ due to her being out of the office. jl

## 2022-10-13 RX ORDER — MONTELUKAST SODIUM 10 MG/1
TABLET ORAL
Qty: 30 TABLET | Refills: 0 | Status: SHIPPED | OUTPATIENT
Start: 2022-10-13

## 2022-11-08 RX ORDER — PANTOPRAZOLE SODIUM 40 MG/1
TABLET, DELAYED RELEASE ORAL
Qty: 90 TABLET | Refills: 1 | Status: SHIPPED | OUTPATIENT
Start: 2022-11-08

## 2023-01-25 ENCOUNTER — TELEPHONE (OUTPATIENT)
Dept: OBSTETRICS AND GYNECOLOGY | Facility: CLINIC | Age: 48
End: 2023-01-25
Payer: COMMERCIAL

## 2023-01-25 DIAGNOSIS — Z12.31 ENCOUNTER FOR SCREENING MAMMOGRAM FOR MALIGNANT NEOPLASM OF BREAST: Primary | ICD-10-CM

## 2023-01-25 NOTE — TELEPHONE ENCOUNTER
Caller: MARINO RAMIRES    Relationship: SELF    Best call back number: [phone]    What orders are you requesting (i.e. lab or imaging): MAMMOGRAM    In what timeframe would the patient need to come in: AFTER 6/7/2023 (1 YEAR)    Where will you receive your lab/imaging services: [facility]    Additional notes:

## 2023-01-25 NOTE — TELEPHONE ENCOUNTER
Patient called this pm.  She talked to someone @ [facility].  I have attached her note.  Patient is request an order for a mammogram.  Last seen 5/23/22. Next appointment 5/25/23.  I have attached an order

## 2023-02-08 ENCOUNTER — TELEPHONE (OUTPATIENT)
Dept: GASTROENTEROLOGY | Facility: CLINIC | Age: 48
End: 2023-02-08
Payer: COMMERCIAL

## 2023-02-08 NOTE — TELEPHONE ENCOUNTER
Patient called and left a voicemail requesting a call back in regards to a message she received off My [facility] about a 3 yr recall. I called the patient and left a vm returning her call and requesting a call back to schedule an appt.

## 2023-03-15 RX ORDER — CETIRIZINE HYDROCHLORIDE 10 MG/1
TABLET ORAL
Qty: 30 TABLET | Refills: 3 | OUTPATIENT
Start: 2023-03-15

## 2023-03-28 RX ORDER — CETIRIZINE HYDROCHLORIDE 10 MG/1
TABLET ORAL
Qty: 30 TABLET | Refills: 3 | OUTPATIENT
Start: 2023-03-28

## 2023-04-17 RX ORDER — CETIRIZINE HYDROCHLORIDE 10 MG/1
TABLET ORAL
Qty: 30 TABLET | Refills: 3 | OUTPATIENT
Start: 2023-04-17

## 2023-04-20 ENCOUNTER — TELEPHONE (OUTPATIENT)
Dept: GASTROENTEROLOGY | Facility: CLINIC | Age: 48
End: 2023-04-20
Payer: COMMERCIAL

## 2023-04-20 NOTE — TELEPHONE ENCOUNTER
Patient called the office to see what the appointment in July was for. I advised that it is a phone screening call and then after that is completed she would be scheduled for the 3 year EGD recall. Patient wanted to know what a recall was so I explained that it means repeat, patient also wanted to know if it was normal to have to do these every 3 years, why Dr. Luu wanted her to have another EGD when she had been told that everything looked good, and wanted to know if she would have to do another in 3 years. I advised that I would send a message to have one of our MA's contact her to answer her questions. Patient verbalized understanding.

## 2023-04-25 NOTE — TELEPHONE ENCOUNTER
I have cancelled the phone screening appointment since it is not needed per Kirstie.  I called and left a vm for the patient advising that the appointment on 07/06 has been cancelled since it is not needed but that if she had any further questions to call the office.

## 2023-05-16 ENCOUNTER — TELEPHONE (OUTPATIENT)
Dept: GASTROENTEROLOGY | Facility: CLINIC | Age: 48
End: 2023-05-16
Payer: COMMERCIAL

## 2023-05-16 NOTE — TELEPHONE ENCOUNTER
Called patient from the cancellation list to see if she wanted an earlier appointment. Left voice message if she calls back we can reschedule her. If the appointments isn't available we can keep her on the cancellation list.

## 2023-05-17 ENCOUNTER — TELEPHONE (OUTPATIENT)
Dept: GASTROENTEROLOGY | Facility: CLINIC | Age: 48
End: 2023-05-17
Payer: COMMERCIAL

## 2023-05-17 NOTE — TELEPHONE ENCOUNTER
Patient called to see if she could move her appointment up. She reschedule to June. She is on the cancellation list

## 2023-06-19 PROBLEM — J44.1 COPD EXACERBATION: Status: ACTIVE | Noted: 2023-06-19

## 2023-06-20 PROBLEM — R73.9 HYPERGLYCEMIA: Status: ACTIVE | Noted: 2023-06-20

## 2023-06-22 PROBLEM — J44.1 COPD EXACERBATION: Status: RESOLVED | Noted: 2023-06-19 | Resolved: 2023-06-22

## 2023-07-14 ENCOUNTER — TELEPHONE (OUTPATIENT)
Dept: OBSTETRICS AND GYNECOLOGY | Facility: CLINIC | Age: 48
End: 2023-07-14

## 2023-07-14 NOTE — TELEPHONE ENCOUNTER
Office called patient to see what information could be released (she didn't kade it at the top, of the form). Rcvd her voicemail, left message for her to call back with the information; so, the form scanned could be printed and the info she's ok with releasing could be marked on the form, she signed and then rescanned.[email or web address]

## 2023-08-04 ENCOUNTER — HOSPITAL ENCOUNTER (OUTPATIENT)
Dept: MAMMOGRAPHY | Facility: HOSPITAL | Age: 48
Discharge: HOME | End: 2023-08-04
Admitting: OBSTETRICS & GYNECOLOGY
Payer: COMMERCIAL

## 2023-08-04 DIAGNOSIS — Z12.31 ENCOUNTER FOR SCREENING MAMMOGRAM FOR MALIGNANT NEOPLASM OF BREAST: ICD-10-CM

## 2023-08-04 PROCEDURE — 77067 SCR MAMMO BI INCL CAD: CPT

## 2023-08-04 PROCEDURE — 77063 BREAST TOMOSYNTHESIS BI: CPT

## 2023-08-14 DIAGNOSIS — J44.9 ASTHMA-COPD OVERLAP SYNDROME: ICD-10-CM

## 2023-08-14 DIAGNOSIS — J45.50 SEVERE PERSISTENT ASTHMA, UNSPECIFIED WHETHER COMPLICATED: ICD-10-CM

## 2023-08-14 RX ORDER — BUDESONIDE, GLYCOPYRROLATE, AND FORMOTEROL FUMARATE 160; 9; 4.8 UG/1; UG/1; UG/1
2 AEROSOL, METERED RESPIRATORY (INHALATION) 2 TIMES DAILY
Qty: 3 EACH | Refills: 4 | Status: SHIPPED | OUTPATIENT
Start: 2023-08-14

## 2023-08-14 NOTE — TELEPHONE ENCOUNTER
[facility] pharmacy sent a request for a new prescription for Breztri but for it to be a 90 day supply. Please Review, Thank you!

## 2023-08-15 ENCOUNTER — OFFICE VISIT (OUTPATIENT)
Dept: PULMONOLOGY | Facility: CLINIC | Age: 48
End: 2023-08-15
Payer: COMMERCIAL

## 2023-08-15 VITALS
HEIGHT: 62 IN | WEIGHT: 291.7 LBS | HEART RATE: 100 BPM | RESPIRATION RATE: 16 BRPM | DIASTOLIC BLOOD PRESSURE: 91 MMHG | SYSTOLIC BLOOD PRESSURE: 134 MMHG | TEMPERATURE: 98 F | BODY MASS INDEX: 53.68 KG/M2 | OXYGEN SATURATION: 100 %

## 2023-08-15 DIAGNOSIS — J45.50 SEVERE PERSISTENT ASTHMA, UNSPECIFIED WHETHER COMPLICATED: ICD-10-CM

## 2023-08-15 DIAGNOSIS — G47.33 OSA (OBSTRUCTIVE SLEEP APNEA): ICD-10-CM

## 2023-08-15 DIAGNOSIS — E66.01 MORBID OBESITY WITH BMI OF 50.0-59.9, ADULT: ICD-10-CM

## 2023-08-15 DIAGNOSIS — J30.2 SEASONAL ALLERGIES: Primary | ICD-10-CM

## 2023-08-15 RX ORDER — NAPROXEN 250 MG/1
TABLET ORAL
COMMUNITY
Start: 2023-06-29

## 2023-08-15 RX ORDER — HYDROXYZINE HYDROCHLORIDE 25 MG/1
TABLET, FILM COATED ORAL
COMMUNITY
Start: 2023-07-06

## 2023-08-15 NOTE — PROGRESS NOTES
Primary Care Provider  Jigna Warren APRN   Referring Provider  No ref. provider found      Patient Complaint  Asthma, Sleep Apnea, Follow-up (Hospital follow up), and Shortness of Breath (SOB upon exertion/)      Subjective          Jaycee Le presents to [facility] PULMONARY & CRITICAL CARE MEDICINE      History of Presenting Illness  Jaycee Le is a 47 y.o. female patient with history of asthma/COPD overlap syndrome, obstructive sleep apnea, seasonal allergies, allergic rhinitis, and tobacco abuse ongoing, here for hospital follow-up.  Patient was hospitalized at [facility] 6/19/2023 through 6/22/2023 for COPD exacerbation and acute hypoxic respiratory failure.  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  Even with these interventions, patient reported worsening of her symptoms.  Patient was admitted for COPD exacerbation.  She was started on IV steroids and scheduled nebulizer treatments, supplemental oxygen as needed.  Chest x-ray clear, labs within normal limits aside from mildly elevated lactic acid.  Blood cultures negative, respiratory panel positive for coronavirus.  With these more intensive interventions, patient began to improve clinically.  She was discharged home in stable condition with instructions to follow-up with her primary care provider and in the pulmonology clinic.  Patient was also prescribed a prednisone taper and instructed to continue oral cefdinir at discharge.    She has been on Breztri inhaler twice daily, albuterol once or twice a day.  She is also on Singulair and Zyrtec.  She is on Protonix daily for gastroesophageal flux disease.  Has no chest pain or chest tightness.  She is Mosque with her CPAP uses.  I reviewed the CPAP usage data today.  Her average daily use is 9 hours and 51 minutes.  CPAP pressure is at  AutoPap 5 to 20 cm of water, median pressure is at 9.5 cm of water.  Apnea-hypopnea index is 0.6.  She is currently working in [employer].  Continues to take Singulair, Zyrtec, Flonase, and Astelin for seasonal allergies and allergic rhinitis.  She uses her CPAP nightly.  She is a current smoker, 0.25 pack/day.  Patient denies any productive cough, hemoptysis, swollen lymph nodes, weight loss, or night sweats.  Overall, patient is doing well and has no additional concerns at this time.  Patient is able to perform ADLs without difficulty.  I have personally reviewed the review of systems, past family, social, medical and surgical histories; and agree with their findings.      Review of Systems    Review of Systems   Constitutional:  Negative for activity change, chills, fatigue, fever, unexpected weight gain and unexpected weight loss.   HENT:  Negative for congestion, ear discharge, ear pain, mouth sores, postnasal drip, rhinorrhea, sinus pressure, sore throat, swollen glands and trouble swallowing.    Eyes:  Negative for blurred vision, pain, discharge, itching and visual disturbance.   Respiratory:  Positive for shortness of breath (with exertion) and wheezing (occasional, much better than before hospitalization). Negative for apnea, cough, chest tightness and stridor.    Cardiovascular:  Negative for chest pain, palpitations and leg swelling.   Gastrointestinal:  Negative for abdominal distention, abdominal pain, constipation, diarrhea, nausea, vomiting, GERD and indigestion.   Musculoskeletal:  Negative for arthralgias, joint swelling and myalgias.   Skin:  Negative for color change.   Neurological:  Negative for dizziness, weakness, light-headedness and headache.    Sleep: Negative for Excessive daytime sleepiness  Negative for morning headaches  Negative for Snoring      Family History   Problem Relation Age of Onset    Prostate cancer Father     Hypertension Father     Heart disease Father      Diabetes Mother     Ovarian cancer Mother 64    Hypertension Mother     Hypertension Brother     Hypertension Sister     Breast cancer Paternal Grandmother     Throat cancer Maternal Grandmother     Lung cancer Maternal Grandfather     Diabetes Maternal Aunt     Breast cancer Paternal Aunt     Breast cancer Paternal Aunt     Breast cancer Paternal Aunt     Uterine cancer Neg Hx     Melanoma Neg Hx         Social History     Socioeconomic History    Marital status: Single    Number of children: 2   Tobacco Use    Smoking status: Every Day     Packs/day: 0.25     Years: 25.00     Pack years: 6.25     Types: Cigarettes    Smokeless tobacco: Never   Vaping Use    Vaping Use: Never used   Substance and Sexual Activity    Alcohol use: Yes     Comment: LIGHT    Drug use: Not Currently     Comment: FORMER    Sexual activity: Not Currently     Birth control/protection: Tubal ligation        Past Medical History:   Diagnosis Date    Abnormal Pap smear of cervix     Achilles tendon rupture 09/15/2015    Anxiety     Apnea     Arthritis     Asthma     Bilateral foot pain 02/04/2019    Bipolar disorder     Broken bones     Bunion     Chronic bronchitis     Clot 01/2016    COPD (chronic obstructive pulmonary disease)     Depression     Family history of migraine headaches     Foot ulcer     Forgetfulness     GERD (gastroesophageal reflux disease)     Magalie's deformity, 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    HBP (high blood pressure)     HBP (high blood pressure)     Heart murmur     Heel pain     Hemorrhoids     HPV (human papilloma virus) infection     Hyperlipidemia     Hypertension     Hypothyroidism     IBS (irritable bowel syndrome)     Kidney disease     Knee pain     Migraine     Night sweats     Numbness in feet     Obesity     Patella, chondromalacia 08/25/2015    Patellar maltracking 08/25/2015    Plantar fascial fibromatosis of both feet     Plantar wart     Polycystic ovarian syndrome     Sinus trouble     SOB (shortness of breath)          Immunization History   Administered Date(s) Administered    COVID-19 (PFIZER) Purple Cap Monovalent 08/07/2021, 08/28/2021    Flu Vaccine Split Quad 11/23/2015, 10/21/2016, 09/12/2017, 09/17/2018    Fluzone >6mos 10/03/2014, 11/23/2015, 10/21/2016, 09/12/2017, 09/17/2018    Hepatitis A 09/17/2018    Influenza Injectable Mdck Pf Quad 09/23/2021    Influenza, Unspecified 09/17/2018, 09/13/2020    Pneumococcal Polysaccharide (PPSV23) 11/19/2009, 10/08/2014    Tdap 11/13/2009, 10/21/2020       Allergies   Allergen Reactions    Vancomycin Other (See Comments) and Unknown - High Severity     BLAIRE, CHEMICAL BURN    LIVER FAILURE    Calcium Channel Blockers Swelling     swelling  swelling      Daptomycin Other (See Comments)     PALPITATIONS HTN  PALPITATIONS HTN      Doxycycline Hyclate Unknown - Low Severity    Lisinopril Other (See Comments) and Unknown - Low Severity     DIZZINESS    Other reaction(s): Unknown    Oxycodone Other (See Comments) and Unknown - Low Severity     Dizziness     Other reaction(s): Unknown    Theophyllines Unknown - Low Severity    Oxycodone-Acetaminophen Itching          Current Outpatient Medications:     albuterol sulfate  (90 Base) MCG/ACT inhaler, Inhale 2 puffs Every 4 (Four) Hours As Needed for Wheezing., Disp: 18 g, Rfl: 11    amLODIPine (NORVASC) 10 MG tablet, Take 1 tablet by mouth Daily., Disp: , Rfl:     azelastine (ASTELIN) 0.1 % nasal spray, 1 spray into the nostril(s) as directed by provider As Needed., Disp: , Rfl:     Budeson-Glycopyrrol-Formoterol (Breztri Aerosphere) 160-9-4.8 MCG/ACT aerosol inhaler, Inhale 2 puffs 2 (Two) Times a Day., Disp: 3 each, Rfl: 4    cetirizine (zyrTEC) 10 MG tablet, Take 1 tablet by mouth Daily., Disp: 30 tablet, Rfl: 3    clobetasol (TEMOVATE) 0.05 % cream, Apply 1 application  topically to the appropriate area as directed As Needed., Disp: , Rfl:     Desvenlafaxine Succinate ER 25 MG tablet sustained-release 24 hour, Take 1 tablet by  mouth Daily., Disp: , Rfl:     doxepin (SINEquan) 50 MG capsule, Take 1 capsule by mouth Daily., Disp: , Rfl:     ferrous sulfate 325 (65 FE) MG EC tablet, Take 1 tablet by mouth Daily With Breakfast., Disp: , Rfl:     fluticasone (FLONASE) 50 MCG/ACT nasal spray, SPRAY ONE SPRAY IN EACH NOSTRIL TWO TIMES A DAY (Patient taking differently: 1 spray into the nostril(s) as directed by provider 2 (Two) Times a Day As Needed.), Disp: 16 mL, Rfl: 11    ipratropium-albuterol (DUO-NEB) 0.5-2.5 mg/3 ml nebulizer, Take 3 mL by nebulization Every 4 (Four) Hours As Needed for Wheezing or Shortness of Air., Disp: 180 mL, Rfl: 5    levothyroxine (SYNTHROID, LEVOTHROID) 112 MCG tablet, Take 1 tablet by mouth Daily., Disp: , Rfl:     metFORMIN (GLUCOPHAGE) 500 MG tablet, Take 1 tablet by mouth 2 (Two) Times a Day With Meals., Disp: , Rfl:     montelukast (SINGULAIR) 10 MG tablet, Take 1 tablet by mouth Every Night., Disp: 30 tablet, Rfl: 0    Mounjaro 5 MG/0.5ML solution pen-injector, Inject 0.5 mL under the skin into the appropriate area as directed 1 (One) Time Per Week. Saturday, Disp: , Rfl:     multivitamin with minerals tablet tablet, 1 tablet Daily., Disp: , Rfl:     naproxen (NAPROSYN) 250 MG tablet, , Disp: , Rfl:     pantoprazole (PROTONIX) 40 MG EC tablet, TAKE ONE TABLET BY MOUTH DAILY (Patient taking differently: Take 1 tablet by mouth Daily.), Disp: 90 tablet, Rfl: 1    simvastatin (ZOCOR) 10 MG tablet, Take 1 tablet by mouth Every Night., Disp: , Rfl:     vitamin D (ERGOCALCIFEROL) 1.25 MG (44817 UT) capsule capsule, Take 1 capsule by mouth 2 (Two) Times a Week. Sunday and  Wednesday, Disp: , Rfl:     hydrOXYzine (ATARAX) 25 MG tablet, , Disp: , Rfl:     mirtazapine (REMERON) 15 MG tablet, Take 1 tablet by mouth Every Night. (Patient not taking: Reported on 8/15/2023), Disp: , Rfl:     Vraylar 6 MG capsule, Take 1 capsule by mouth Daily., Disp: , Rfl:      Objective     Vital Signs:   /91 (BP Location: Left  "arm, Patient Position: Sitting, Cuff Size: Large Adult)   Pulse 100   Temp 98 øF (36.7 øC) (Tympanic)   Resp 16   Ht 157.5 cm (62\")   Wt 132 kg (291 lb 11.2 oz)   SpO2 100% Comment: room air  BMI 53.35 kg/mý     Objective   Physical Exam  Constitutional:       General: She is not in acute distress.     Appearance: Normal appearance. She is obese. She is not ill-appearing.   HENT:      Right Ear: Tympanic membrane and ear canal normal.      Left Ear: Tympanic membrane and ear canal normal.      Nose: Nose normal.      Mouth/Throat:      Mouth: Mucous membranes are moist.      Pharynx: Oropharynx is clear.   Eyes:      Extraocular Movements: Extraocular movements intact.      Conjunctiva/sclera: Conjunctivae normal.      Pupils: Pupils are equal, round, and reactive to light.   Cardiovascular:      Rate and Rhythm: Normal rate and regular rhythm.      Pulses: Normal pulses.      Heart sounds: Normal heart sounds.   Pulmonary:      Effort: Pulmonary effort is normal. No respiratory distress.      Breath sounds: Normal breath sounds. No stridor. No wheezing, rhonchi or rales.   Abdominal:      General: Bowel sounds are normal.      Palpations: Abdomen is soft.   Musculoskeletal:         General: No swelling. Normal range of motion.      Cervical back: Normal range of motion and neck supple.      Right lower leg: No edema.      Left lower leg: No edema.   Skin:     General: Skin is warm and dry.   Neurological:      General: No focal deficit present.      Mental Status: She is alert and oriented to person, place, and time.      Motor: No weakness.   Psychiatric:         Mood and Affect: Mood normal.         Behavior: Behavior normal.       Result Review :   I have personally reviewed patient's labs and images.  I also reviewed the ED provider note 6/19/2023, Dr. Wright's consult note 6/21/2023, and Dr. Gunderson's discharge summary 6/22/2023.            Diagnoses and all orders for this visit:    1. Seasonal allergies " (Primary)  -     PAP Therapy    2. Morbid obesity with BMI of 50.0-59.9, adult  -     PAP Therapy    3. Severe persistent asthma, unspecified whether complicated  -     PAP Therapy    4. LUCIANO (obstructive sleep apnea)  -     PAP Therapy       Impression and Plan    -CXR 6/19/2023 showed no acute cardiopulmonary processes  -Continue using incentive spirometer and flutter valve throughout the day as able  -Continue using CPAP nightly.  I will order new CPAP machine today.  CPAP uses data reviewed.  Weight loss counseling done.  -Discontinue Trelegy due to insurance issues  -Begin using Breztri 2 puffs twice daily, reminded patient that she will need to rinse her mouth with this inhaler as she did with the trilogy.  Prescription sent to patient's pharmacy and samples provided in clinic today.  -Continue using albuterol rescue inhaler and DuoNeb treatments as needed, refills of albuterol sent to patient's pharmacy  -Continue taking Singulair, Zyrtec, Flonase, and Astelin nasal spray for seasonal allergies/allergic rhinitis  -Smoking cessation counseling provided.  I spent 5 minutes today counseling patient on the risks of smoking, including throat cancer, lung cancer, COPD, heart disease and death.  Also discussed the benefits of quitting.  -Spent 5 minutes counseling patient on diet and exercise.  Recommended a low-fat, 1800-calorie diet.  Also recommend 30 minutes of daily exercise.  Patient verbalizes understanding.    Smoking status: Reviewed  Vaccination status: Patient reports she is up-to-date with her pneumonia and Covid vaccines, will be due for flu vaccine in the fall.  Patient is advised to continue to follow CDC recommendations such as social distancing wearing a mask and washing hands for at least 20 seconds.  Medications personally reviewed    Follow Up   Return in about 6 months (around 2/15/2024).  Patient was given instructions and counseling regarding her condition or for health maintenance advice.  Please see specific information pulled into the AVS if appropriate.

## 2023-09-10 DIAGNOSIS — J44.9 ASTHMA-COPD OVERLAP SYNDROME: ICD-10-CM

## 2023-09-10 DIAGNOSIS — J30.2 SEASONAL ALLERGIES: ICD-10-CM

## 2023-09-10 DIAGNOSIS — J45.50 SEVERE PERSISTENT ASTHMA, UNSPECIFIED WHETHER COMPLICATED: ICD-10-CM

## 2023-09-11 RX ORDER — MONTELUKAST SODIUM 10 MG/1
TABLET ORAL
Qty: 30 TABLET | Refills: 0 | Status: SHIPPED | OUTPATIENT
Start: 2023-09-11

## 2023-10-11 DIAGNOSIS — J45.50 SEVERE PERSISTENT ASTHMA, UNSPECIFIED WHETHER COMPLICATED: ICD-10-CM

## 2023-10-11 DIAGNOSIS — J44.89 ASTHMA-COPD OVERLAP SYNDROME: ICD-10-CM

## 2023-10-11 RX ORDER — ALBUTEROL SULFATE 90 UG/1
2 AEROSOL, METERED RESPIRATORY (INHALATION) EVERY 4 HOURS PRN
Qty: 18 G | Refills: 11 | Status: SHIPPED | OUTPATIENT
Start: 2023-10-11

## 2023-10-30 PROBLEM — L97.509 FOOT ULCER: Status: ACTIVE | Noted: 2023-10-30

## 2023-10-30 PROBLEM — M25.569 CHRONIC KNEE PAIN: Status: ACTIVE | Noted: 2023-10-30

## 2023-10-30 PROBLEM — M79.672 FOOT PAIN, BILATERAL: Status: ACTIVE | Noted: 2019-02-04

## 2023-10-30 PROBLEM — F30.9 BIPOLAR I DISORDER, SINGLE MANIC EPISODE: Status: ACTIVE | Noted: 2023-10-30

## 2023-10-30 PROBLEM — F41.9 ANXIETY AND DEPRESSION: Status: ACTIVE | Noted: 2023-10-30

## 2023-10-30 PROBLEM — K21.9 GASTRO-ESOPHAGEAL REFLUX DISEASE WITHOUT ESOPHAGITIS: Status: ACTIVE | Noted: 2021-05-24

## 2023-10-30 PROBLEM — F41.9 ANXIETY: Status: ACTIVE | Noted: 2021-05-24

## 2023-10-30 PROBLEM — Z79.01 ANTICOAGULATED ON COUMADIN: Status: ACTIVE | Noted: 2023-10-30

## 2023-10-30 PROBLEM — R06.02 SHORTNESS OF BREATH: Status: ACTIVE | Noted: 2023-10-30

## 2023-10-30 PROBLEM — G43.909 MIGRAINE: Status: ACTIVE | Noted: 2023-10-30

## 2023-10-30 PROBLEM — R01.1 HEART MURMUR: Status: ACTIVE | Noted: 2023-10-30

## 2023-10-30 PROBLEM — J44.9 COPD (CHRONIC OBSTRUCTIVE PULMONARY DISEASE): Chronic | Status: ACTIVE | Noted: 2021-05-24

## 2023-10-30 PROBLEM — M54.42 CHRONIC BILATERAL LOW BACK PAIN WITH SCIATICA: Status: ACTIVE | Noted: 2023-10-30

## 2023-10-30 PROBLEM — Z72.0 TOBACCO ABUSE: Status: ACTIVE | Noted: 2021-05-24

## 2023-10-30 PROBLEM — M25.50 ARTHRALGIA OF MULTIPLE JOINTS: Status: ACTIVE | Noted: 2023-10-30

## 2023-10-30 PROBLEM — R53.83 FATIGUE: Status: ACTIVE | Noted: 2023-10-30

## 2023-10-30 PROBLEM — M54.40 CHRONIC BILATERAL LOW BACK PAIN WITH SCIATICA: Status: ACTIVE | Noted: 2023-10-30

## 2023-10-30 PROBLEM — E11.69 HYPERLIPIDEMIA ASSOCIATED WITH TYPE 2 DIABETES MELLITUS: Chronic | Status: ACTIVE | Noted: 2021-05-24

## 2023-10-30 PROBLEM — I10 ESSENTIAL (PRIMARY) HYPERTENSION: Status: ACTIVE | Noted: 2022-08-03

## 2023-10-30 PROBLEM — M79.671 FOOT PAIN, BILATERAL: Status: ACTIVE | Noted: 2019-02-04

## 2023-10-30 PROBLEM — R20.0 NUMBNESS IN FEET: Status: ACTIVE | Noted: 2023-10-30

## 2023-10-30 PROBLEM — R53.82 CHRONIC FATIGUE: Status: ACTIVE | Noted: 2023-10-30

## 2023-10-30 PROBLEM — J45.909 ASTHMA: Status: ACTIVE | Noted: 2023-10-30

## 2023-10-30 PROBLEM — E55.9 VITAMIN D DEFICIENCY: Status: ACTIVE | Noted: 2021-05-24

## 2023-10-30 PROBLEM — M19.90 ARTHRITIS: Status: ACTIVE | Noted: 2023-10-30

## 2023-10-30 PROBLEM — K58.2 IRRITABLE BOWEL SYNDROME WITH BOTH CONSTIPATION AND DIARRHEA: Status: ACTIVE | Noted: 2021-05-24

## 2023-10-30 PROBLEM — G89.29 CHRONIC KNEE PAIN: Status: ACTIVE | Noted: 2023-10-30

## 2023-10-30 PROBLEM — E28.2 POLYCYSTIC OVARIES: Status: ACTIVE | Noted: 2023-10-30

## 2023-10-30 PROBLEM — G47.00 INSOMNIA DISORDER: Status: ACTIVE | Noted: 2021-05-24

## 2023-10-30 PROBLEM — N28.9 KIDNEY DISEASE: Status: ACTIVE | Noted: 2023-10-30

## 2023-10-30 PROBLEM — R06.09 DYSPNEA ON EXERTION: Status: ACTIVE | Noted: 2023-10-30

## 2023-10-30 PROBLEM — M54.41 CHRONIC BILATERAL LOW BACK PAIN WITH SCIATICA: Status: ACTIVE | Noted: 2023-10-30

## 2023-10-30 PROBLEM — G89.29 CHRONIC BILATERAL LOW BACK PAIN WITH SCIATICA: Status: ACTIVE | Noted: 2023-10-30

## 2023-10-30 PROBLEM — R06.81 APNEA: Status: ACTIVE | Noted: 2023-10-30

## 2023-10-30 PROBLEM — E03.9 ACQUIRED HYPOTHYROIDISM: Status: ACTIVE | Noted: 2021-05-24

## 2023-10-30 PROBLEM — F32.A DEPRESSIVE DISORDER: Status: ACTIVE | Noted: 2023-10-30

## 2023-10-30 PROBLEM — R68.89 FORGETFULNESS: Status: ACTIVE | Noted: 2023-10-30

## 2023-10-30 PROBLEM — F33.41 RECURRENT MAJOR DEPRESSIVE DISORDER, IN PARTIAL REMISSION: Chronic | Status: ACTIVE | Noted: 2021-05-24

## 2023-10-30 PROBLEM — E11.9 TYPE 2 DIABETES MELLITUS WITHOUT COMPLICATIONS: Chronic | Status: ACTIVE | Noted: 2021-05-24

## 2023-10-30 PROBLEM — E78.5 HYPERLIPIDEMIA ASSOCIATED WITH TYPE 2 DIABETES MELLITUS: Chronic | Status: ACTIVE | Noted: 2021-05-24

## 2023-10-30 PROBLEM — F31.9 BIPOLAR DISORDER: Status: ACTIVE | Noted: 2023-10-30

## 2023-10-31 PROBLEM — F41.9 ANXIETY: Status: RESOLVED | Noted: 2021-05-24 | Resolved: 2023-10-31

## 2023-10-31 PROBLEM — D64.9 ANEMIA: Status: ACTIVE | Noted: 2023-10-31

## 2023-11-30 ENCOUNTER — TELEPHONE (OUTPATIENT)
Dept: GASTROENTEROLOGY | Facility: CLINIC | Age: 48
End: 2023-11-30

## 2023-11-30 NOTE — TELEPHONE ENCOUNTER
Hub staff attempted to follow warm transfer process and was unsuccessful     Caller: Jaycee Le    Relationship to patient: Self    Best call back number: [phone]     Patient is needing: PT IS WANTING TO BE SCHEDULED FOR A MYCHART VISIT BUT HASN'T BEEN SEEN IN OFFICE IN MORE THAN A YEAR. OFFICE PLEASE ADVISE.

## 2023-12-21 NOTE — PROGRESS NOTES
Primary Care Provider  Jigna Warren APRN   Referring Provider  No ref. provider found    Patient Complaint  Follow-up and Shortness of Breath      SUBJECTIVE    History of Presenting Illness  Jaycee Le is a pleasant 48 y.o. female who presents to [facility] for acute visit for shortness of air.  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  Patient has a history of asthma COPD overlap, LUCIANO on CPAP.  Patient continues on Breztri, albuterol, cetirizine, azelastine, Flonase, and Singulair.  She is also on Protonix for GERD.  Patient does continue to smoke a pack a day.  Patient is interested in quitting smoking she states that she is going to have bariatric surgery but has to have had quit smoking for 2 months prior to getting the surgery.  She is very motivated to quit.    Compliance report on her LUCIANO with CPAP usage shows an AHI 0.4.  Patient has used it 19 out of 30 days with a 63% compliance.  Average usage is 9 hours and 5 minutes.  Patient states her compliance for this past month has been low because she had to get a new extension cord and she was unable to use her machine for about a week and a half.  She is now very compliant with using it she states.      Patient states she does have shortness of air usually with cleaning/exertional activities but recovers easily with rest.  She is not on O2 at this time.  At present time she denies coughing, wheezing, headaches, chest pain, weight loss or hemoptysis. Denies fevers, chills and night sweats. Jaycee Le is able to perform ADLs without difficulties and denies any swollen glands/lymph nodes in the head or neck.    I have personally reviewed the review of systems, past family, social, medical and surgical histories; and agree with their findings.    Review of Systems  Constitutional symptoms:  Denied complaints   Ear, nose, throat: Denied complaints  Cardiovascular:  Denied  complaints  Respiratory: Shortness of air with exertional activities  Gastrointestinal: Denied complaints  Musculoskeletal: Denied complaints    Family History   Problem Relation Age of Onset    Prostate cancer Father     Hypertension Father     Heart disease Father     Diabetes Mother     Ovarian cancer Mother 64    Hypertension Mother     Hypertension Brother     Hypertension Sister     Breast cancer Paternal Grandmother     Throat cancer Maternal Grandmother     Lung cancer Maternal Grandfather     Diabetes Maternal Aunt     Breast cancer Paternal Aunt     Breast cancer Paternal Aunt     Breast cancer Paternal Aunt     Uterine cancer Neg Hx     Melanoma Neg Hx         Social History     Socioeconomic History    Marital status: Single    Number of children: 2   Tobacco Use    Smoking status: Every Day     Packs/day: 0.25     Years: 25.00     Additional pack years: 0.00     Total pack years: 6.25     Types: Cigarettes    Smokeless tobacco: Never   Vaping Use    Vaping Use: Never used   Substance and Sexual Activity    Alcohol use: Yes     Comment: LIGHT    Drug use: Not Currently     Comment: FORMER    Sexual activity: Not Currently     Birth control/protection: Tubal ligation        Past Medical History:   Diagnosis Date    Abnormal Pap smear of cervix     Achilles tendon rupture 09/15/2015    Anxiety     Apnea     Arthritis     Asthma     Bilateral foot pain 02/04/2019    Bipolar disorder     Broken bones     Bunion     Chronic bronchitis     Clot 01/2016    COPD (chronic obstructive pulmonary disease)     Depression     Family history of migraine headaches     Foot ulcer     Forgetfulness     GERD (gastroesophageal reflux disease)     Magalie's deformity, 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    HBP (high blood pressure)     HBP (high blood pressure)     Heart murmur     Heel pain     Hemorrhoids     HPV (human papilloma virus) infection     Hyperlipidemia     Hypertension     Hypothyroidism     IBS (irritable bowel syndrome)      Kidney disease     Knee pain     Migraine     Night sweats     Numbness in feet     Obesity     Patella, chondromalacia 08/25/2015    Patellar maltracking 08/25/2015    Plantar fascial fibromatosis of both feet     Plantar wart     Polycystic ovarian syndrome     Sinus trouble     SOB (shortness of breath)         Immunization History   Administered Date(s) Administered    COVID-19 (PFIZER) Purple Cap Monovalent 08/07/2021, 08/28/2021    Flu Vaccine Split Quad 11/23/2015, 10/21/2016, 09/12/2017, 09/17/2018    Fluzone (or Fluarix & Flulaval for VFC) >6mos 10/03/2014, 11/23/2015, 10/21/2016, 09/12/2017, 09/17/2018    Hepatitis A 09/17/2018    Influenza Injectable Mdck Pf Quad 09/23/2021, 10/09/2023    Influenza, Unspecified 09/17/2018, 09/13/2020    Pneumococcal Polysaccharide (PPSV23) 11/19/2009, 10/08/2014    Tdap 11/13/2009, 10/21/2020       Allergies   Allergen Reactions    Vancomycin Other (See Comments) and Unknown - High Severity     BLAIRE, CHEMICAL BURN    LIVER FAILURE    Calcium Channel Blockers Swelling     swelling  swelling      Daptomycin Other (See Comments)     PALPITATIONS HTN  PALPITATIONS HTN      Doxycycline Hyclate Unknown - Low Severity    Lisinopril Other (See Comments) and Unknown - Low Severity     DIZZINESS    Other reaction(s): Unknown    Oxycodone Other (See Comments) and Unknown - Low Severity     Dizziness     Other reaction(s): Unknown    Theophyllines Unknown - Low Severity    Oxycodone-Acetaminophen Itching          Current Outpatient Medications:     albuterol sulfate  (90 Base) MCG/ACT inhaler, Inhale 2 puffs Every 4 (Four) Hours As Needed for Wheezing., Disp: 18 g, Rfl: 11    amLODIPine (NORVASC) 10 MG tablet, Take 1 tablet by mouth Daily., Disp: 90 tablet, Rfl: 1    azelastine (ASTELIN) 0.1 % nasal spray, 1 spray into the nostril(s) as directed by provider As Needed., Disp: , Rfl:     Budeson-Glycopyrrol-Formoterol (Breztri Aerosphere) 160-9-4.8 MCG/ACT aerosol inhaler, Inhale  2 puffs 2 (Two) Times a Day., Disp: 32.1 g, Rfl: 4    Cariprazine HCl (Vraylar) 6 MG capsule, Take 1 capsule by mouth Daily., Disp: 30 capsule, Rfl: 2    cetirizine (zyrTEC) 10 MG tablet, Take 1 tablet by mouth Daily., Disp: 90 tablet, Rfl: 3    clobetasol (TEMOVATE) 0.05 % cream, Apply 1 application  topically to the appropriate area as directed As Needed., Disp: , Rfl:     desvenlafaxine (Pristiq) 100 MG 24 hr tablet, Take 1 tablet by mouth once a day, Disp: 30 tablet, Rfl: 2    Desvenlafaxine Succinate ER 25 MG tablet sustained-release 24 hour, Take 1 tablet by mouth Daily., Disp: , Rfl:     doxepin (SINEquan) 75 MG capsule, Take 1 capsule by mouth every night at bedtime., Disp: 30 capsule, Rfl: 2    ergocalciferol (ERGOCALCIFEROL) 1.25 MG (55706 UT) capsule, Take 1 capsule by mouth 2 (Two) Times a Week., Disp: 25 capsule, Rfl: 3    ferrous sulfate 325 (65 FE) MG EC tablet, Take 1 tablet by mouth Daily With Breakfast., Disp: , Rfl:     fluticasone (FLONASE) 50 MCG/ACT nasal spray, SPRAY ONE SPRAY IN EACH NOSTRIL TWO TIMES A DAY (Patient taking differently: 1 spray into the nostril(s) as directed by provider 2 (Two) Times a Day As Needed.), Disp: 16 mL, Rfl: 11    HYDROcodone-acetaminophen (NORCO) 5-325 MG per tablet, Take 1 tablet by mouth 2 (Two) Times a Day., Disp: 60 tablet, Rfl: 0    ipratropium-albuterol (DUO-NEB) 0.5-2.5 mg/3 ml nebulizer, Take 3 mL by nebulization Every 4 (Four) Hours As Needed for Wheezing or Shortness of Air., Disp: 180 mL, Rfl: 5    levothyroxine (SYNTHROID, LEVOTHROID) 112 MCG tablet, Take 1 tablet by mouth Daily., Disp: 90 tablet, Rfl: 0    metFORMIN (GLUCOPHAGE) 500 MG tablet, Take 1 tablet by mouth 2 (Two) Times a Day., Disp: 60 tablet, Rfl: 2    montelukast (SINGULAIR) 10 MG tablet, Take 1 tablet by mouth Every Night., Disp: 90 tablet, Rfl: 3    Mounjaro 5 MG/0.5ML solution pen-injector, Inject 0.5 mL under the skin into the appropriate area as directed 1 (One) Time Per Week.  "Saturday, Disp: , Rfl:     multivitamin with minerals tablet tablet, 1 tablet Daily., Disp: , Rfl:     naltrexone (DEPADE) 50 MG tablet, 1 tablet by mouth once a day, Disp: 10 tablet, Rfl: 0    pantoprazole (PROTONIX) 40 MG EC tablet, TAKE ONE TABLET BY MOUTH DAILY (Patient taking differently: Take 1 tablet by mouth Daily.), Disp: 90 tablet, Rfl: 1    phentermine (ADIPEX-P) 37.5 MG tablet, Take 1 tablet by mouth Daily., Disp: 30 tablet, Rfl: 0    simvastatin (ZOCOR) 20 MG tablet, Take 1 tablet by mouth every night at bedtime., Disp: 30 tablet, Rfl: 5    vitamin D (ERGOCALCIFEROL) 1.25 MG (59977 UT) capsule capsule, Take 1 capsule by mouth 2 (Two) Times a Week. Sunday and  Wednesday, Disp: , Rfl:     doxepin (SINEquan) 50 MG capsule, Take 1 capsule by mouth Daily., Disp: , Rfl:     predniSONE (DELTASONE) 10 MG tablet, 6 daily x 2 days, 5 daily x 2 days, 4 daily x 2 days, 3 daily x 2 days, 2 daily x 2 days, 1 daily x 2 days, Disp: 42 tablet, Rfl: 0           Vital Signs   /71 (BP Location: Right arm, Patient Position: Sitting, Cuff Size: Adult)   Pulse 82   Temp 98 °F (36.7 °C) (Temporal)   Resp 20   Ht 165.1 cm (65\")   Wt (!) 136 kg (300 lb 3.2 oz)   SpO2 98% Comment: ROOM AIR  BMI 49.96 kg/m²       OBJECTIVE    Physical Exam  Vital Signs Reviewed   WDWN, Alert, NAD, obese.    HEENT:  PERRL, EOMI.  OP, nares clear  Neck:  Supple, no JVD, no thyromegaly  Chest:  good aeration, clear to auscultation bilaterally, tympanic to percussion bilaterally, no work of breathing noted  CV: RRR, no MGR, pulses 2+, equal.  Abd:  Soft, NT, ND, + BS, no HSM  EXT:  no clubbing, no cyanosis, no edema  Neuro:  A&Ox3, CN grossly intact, no focal deficits.  Skin: No rashes or lesions noted    Results Review  I have personally reviewed the prior office notes, hospital records, labs, and diagnostics.    ASSESSMENT         Patient Active Problem List   Diagnosis    Severe persistent asthma    LUCIANO on CPAP    Tobacco abuse, in " remission    Seasonal allergies    Morbid obesity with BMI of 50.0-59.9, adult    Hyperglycemia    COPD (chronic obstructive pulmonary disease)    Vitamin D deficiency    Type 2 diabetes mellitus without complications    Tobacco abuse    Dyspnea on exertion    Recurrent major depressive disorder, in partial remission    PCOS (polycystic ovarian syndrome)    Numbness in feet    Migraine    Kidney disease    Irritable bowel syndrome with both constipation and diarrhea    Insomnia disorder    Essential (primary) hypertension    Vascular disorder    Hyperlipidemia associated with type 2 diabetes mellitus    History of methicillin resistant staphylococcus aureus (MRSA)    Heart murmur    Foot ulcer    Forgetfulness    Foot pain, bilateral    Chronic fatigue    Anxiety and depression    Bipolar disorder    Bipolar I disorder, single manic episode    Asthma    Arthritis    Arthralgia of multiple joints    Apnea    Anticoagulated on Coumadin    Hypothyroidism    Gastro-esophageal reflux disease without esophagitis    Chronic bilateral low back pain with sciatica    Chronic knee pain    Anemia       Encounter Diagnoses   Name Primary?    Asthma-COPD overlap syndrome Yes    Seasonal allergies     Dyspnea on exertion     Encounter for smoking cessation counseling     Morbid obesity with BMI of 50.0-59.9, adult       PLAN  Smoking cessation counseling provided.  I spent 5 minutes today counseling patient on the risks of smoking and potential other diseases including throat cancer, lung cancer, COPD, heart disease and death.  Also discussed the benefits of quitting.  Patient was offered medication management to aid in smoking cessation.The patient was instructed to use the nicotine patch course along with nicotine gum to manage urges.  We also discussed use of a baggy system to help gradually taper down daily the amount of smoking if she quits.  Patient states she will try this strategy.  -Patient will be scheduled for PFT and  6-minute walk  -Refills on Zyrtec and Singulair sent to her pharmacy  -Patient to continue with Breztri rinsing out her mouth after each use to prevent oral thrush  -Patient to continue with albuterol inhaler or nebulizer as needed for shortness of air or wheeze  -We will revisit patient to get Prevnar 20 at her next follow-up  -Patient prescribed prednisone taper dose to have on hand in case of an exacerbation with instructions in use  -Patient will follow back up after her PFT and 6-minute walk to review results or sooner if needed    Diagnoses and all orders for this visit:    1. Asthma-COPD overlap syndrome (Primary)  -     Complete PFT - Pre & Post Bronchodilator; Future  -     6 Minute Walk Test; Future  -     cetirizine (zyrTEC) 10 MG tablet; Take 1 tablet by mouth Daily.  Dispense: 90 tablet; Refill: 3  -     montelukast (SINGULAIR) 10 MG tablet; Take 1 tablet by mouth Every Night.  Dispense: 90 tablet; Refill: 3  -     predniSONE (DELTASONE) 10 MG tablet; 6 daily x 2 days, 5 daily x 2 days, 4 daily x 2 days, 3 daily x 2 days, 2 daily x 2 days, 1 daily x 2 days  Dispense: 42 tablet; Refill: 0    2. Seasonal allergies  -     cetirizine (zyrTEC) 10 MG tablet; Take 1 tablet by mouth Daily.  Dispense: 90 tablet; Refill: 3  -     montelukast (SINGULAIR) 10 MG tablet; Take 1 tablet by mouth Every Night.  Dispense: 90 tablet; Refill: 3    3. Dyspnea on exertion  -     Complete PFT - Pre & Post Bronchodilator; Future  -     6 Minute Walk Test; Future    4. Encounter for smoking cessation counseling    5. Morbid obesity with BMI of 50.0-59.9, adult           Smoking status:current  Vaccination status: Reviewed  Medications personally reviewed    Follow Up  Return in about 3 months (around 3/22/2024) for After PFT and 6-minute.    Patient was given instructions and counseling regarding her condition or for health maintenance advice. Please see specific information pulled into the AVS if appropriate.

## 2023-12-22 ENCOUNTER — OFFICE VISIT (OUTPATIENT)
Dept: PULMONOLOGY | Facility: CLINIC | Age: 48
End: 2023-12-22
Payer: COMMERCIAL

## 2023-12-22 VITALS
RESPIRATION RATE: 20 BRPM | SYSTOLIC BLOOD PRESSURE: 139 MMHG | BODY MASS INDEX: 48.82 KG/M2 | OXYGEN SATURATION: 98 % | HEART RATE: 82 BPM | WEIGHT: 293 LBS | HEIGHT: 65 IN | TEMPERATURE: 98 F | DIASTOLIC BLOOD PRESSURE: 71 MMHG

## 2023-12-22 DIAGNOSIS — J30.2 SEASONAL ALLERGIES: ICD-10-CM

## 2023-12-22 DIAGNOSIS — E66.01 MORBID OBESITY WITH BMI OF 50.0-59.9, ADULT: ICD-10-CM

## 2023-12-22 DIAGNOSIS — Z71.6 ENCOUNTER FOR SMOKING CESSATION COUNSELING: ICD-10-CM

## 2023-12-22 DIAGNOSIS — R06.09 DYSPNEA ON EXERTION: ICD-10-CM

## 2023-12-22 DIAGNOSIS — J44.89 ASTHMA-COPD OVERLAP SYNDROME: Primary | ICD-10-CM

## 2023-12-22 PROCEDURE — 99214 OFFICE O/P EST MOD 30 MIN: CPT | Performed by: NURSE PRACTITIONER

## 2023-12-22 RX ORDER — MONTELUKAST SODIUM 10 MG/1
10 TABLET ORAL NIGHTLY
Qty: 90 TABLET | Refills: 3 | Status: SHIPPED | OUTPATIENT
Start: 2023-12-22

## 2023-12-22 RX ORDER — CETIRIZINE HYDROCHLORIDE 10 MG/1
10 TABLET ORAL DAILY
Qty: 90 TABLET | Refills: 3 | Status: SHIPPED | OUTPATIENT
Start: 2023-12-22

## 2023-12-22 RX ORDER — PREDNISONE 10 MG/1
TABLET ORAL
Qty: 42 TABLET | Refills: 0 | Status: SHIPPED | OUTPATIENT
Start: 2023-12-22

## 2023-12-29 ENCOUNTER — TELEPHONE (OUTPATIENT)
Dept: GASTROENTEROLOGY | Facility: CLINIC | Age: 48
End: 2023-12-29
Payer: COMMERCIAL

## 2023-12-29 NOTE — TELEPHONE ENCOUNTER
Left voice message for patient to return call to see if she would like a sooner appointment. Kirstie has a few openings on 1/3/24-1/4/24. If patient calls back and the appointments isn't available, she can stay on the cancellation list.

## 2024-01-04 RX ORDER — PANTOPRAZOLE SODIUM 40 MG/1
40 TABLET, DELAYED RELEASE ORAL DAILY
Qty: 90 TABLET | Refills: 1 | Status: SHIPPED | OUTPATIENT
Start: 2024-01-04

## 2024-01-04 NOTE — TELEPHONE ENCOUNTER
Patient is requesting a refill of pantoprazole, order pended.  Last o/v-5/10/21  Last refill-11/8/22  Next o/v-4/25/24 (next available)

## 2024-01-11 ENCOUNTER — HOSPITAL ENCOUNTER (OUTPATIENT)
Dept: RESPIRATORY THERAPY | Facility: HOSPITAL | Age: 49
Discharge: HOME | End: 2024-01-11
Payer: COMMERCIAL

## 2024-01-19 ENCOUNTER — TELEPHONE (OUTPATIENT)
Dept: PULMONOLOGY | Facility: CLINIC | Age: 49
End: 2024-01-19

## 2024-01-19 NOTE — TELEPHONE ENCOUNTER
Called pt and gave her [employer] number for patient assistance I spoke to our clinic Coordinator and we put back 2 samples for her till she gets the patient assistance completed. Pt understood

## 2024-01-19 NOTE — TELEPHONE ENCOUNTER
Caller: MARINO RAMIRES     Relationship:SELF     Callback number: [phone]   Is it ok to leave a message: [x] Yes [] No    Requested medication for samples: BREZTRI     How much medication does the patient currently have left: NONE    Who will be picking up the samples: SELF    Do you need information about patient financial assistance for this medication: [x] Yes [] No    Additional details provided:

## 2024-02-07 ENCOUNTER — OFFICE VISIT (OUTPATIENT)
Dept: GASTROENTEROLOGY | Facility: CLINIC | Age: 49
End: 2024-02-07
Payer: COMMERCIAL

## 2024-02-07 VITALS
WEIGHT: 293 LBS | HEIGHT: 65 IN | HEART RATE: 73 BPM | BODY MASS INDEX: 48.82 KG/M2 | DIASTOLIC BLOOD PRESSURE: 102 MMHG | SYSTOLIC BLOOD PRESSURE: 150 MMHG

## 2024-02-07 DIAGNOSIS — K21.9 GASTROESOPHAGEAL REFLUX DISEASE, UNSPECIFIED WHETHER ESOPHAGITIS PRESENT: ICD-10-CM

## 2024-02-07 DIAGNOSIS — K59.04 CHRONIC IDIOPATHIC CONSTIPATION: Primary | ICD-10-CM

## 2024-02-07 DIAGNOSIS — K58.1 IRRITABLE BOWEL SYNDROME WITH CONSTIPATION: ICD-10-CM

## 2024-02-07 DIAGNOSIS — K64.8 INTERNAL HEMORRHOIDS: ICD-10-CM

## 2024-02-07 PROCEDURE — 99214 OFFICE O/P EST MOD 30 MIN: CPT | Performed by: NURSE PRACTITIONER

## 2024-02-07 RX ORDER — HYDROCORTISONE ACETATE 25 MG/1
24 SUPPOSITORY RECTAL 2 TIMES DAILY
Qty: 14 SUPPOSITORY | Refills: 1 | Status: SHIPPED | OUTPATIENT
Start: 2024-02-07

## 2024-02-07 RX ORDER — PANTOPRAZOLE SODIUM 40 MG/1
40 TABLET, DELAYED RELEASE ORAL DAILY
Qty: 90 TABLET | Refills: 2 | Status: SHIPPED | OUTPATIENT
Start: 2024-02-07

## 2024-02-07 RX ORDER — DICYCLOMINE HCL 20 MG
20 TABLET ORAL EVERY 6 HOURS PRN
Qty: 120 TABLET | Refills: 3 | Status: SHIPPED | OUTPATIENT
Start: 2024-02-07

## 2024-02-07 NOTE — PROGRESS NOTES
Chief Complaint     Heartburn    History of Present Illness     Jaycee Le is a 48 y.o. female who presents to [facility] for follow-up of GERD.      Reports that she was experiencing symptoms of reflux.  Prescribed protonix per PCP with relief.      States that she hasn't had an IBS flare up for about one month.  When these episodes occur, dicyclomine is helpful for the discomfort.  States that she's not having regular bowel movements.  She's been having a bowel movement every 3-4 days.  Previously felt that Linzess controlled constipation.      Reports BRB with bowel movements that's present every few months.  She feels this is related to internal hemorrhoids.  Will usually last for about 24 hours.  Denies rectal pain.  Notices this more when she's straining secondary to constipation.       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    HBP (high blood pressure)     HBP (high blood pressure)     Heart murmur     Heel pain     Hemorrhoids     HPV (human papilloma virus) infection     Hyperlipidemia     Hypertension     Hypothyroidism     IBS (irritable bowel syndrome)     Kidney disease     Knee pain     Migraine     Night sweats     Numbness in feet     Obesity     Patella, chondromalacia 08/25/2015    Patellar maltracking 08/25/2015    Plantar fascial fibromatosis of both feet     Plantar wart     Polycystic ovarian syndrome     Sinus trouble     SOB (shortness of breath)      Past Surgical History:    Procedure Laterality Date    ACHILLES TENDON REPAIR       SECTION  .    COLONOSCOPY      ENDOSCOPY  2020    LASER ABLATION      UTERINE ABLATION    TUBAL ABDOMINAL LIGATION      UPPER GASTROINTESTINAL ENDOSCOPY       Family History   Problem Relation Age of Onset    Diabetes Mother     Ovarian cancer Mother 64    Hypertension Mother     Prostate cancer Father     Hypertension Father     Heart disease Father     Hypertension Sister     Hypertension Brother     Diabetes Maternal Aunt     Breast cancer Paternal Aunt     Breast cancer Paternal Aunt     Breast cancer Paternal Aunt     Throat cancer Maternal Grandmother     Lung cancer Maternal Grandfather     Breast cancer Paternal Grandmother     Uterine cancer Neg Hx     Melanoma Neg Hx     Colon cancer Neg Hx         Current Medications       Current Outpatient Medications:     albuterol sulfate  (90 Base) MCG/ACT inhaler, Inhale 2 puffs Every 4 (Four) Hours As Needed for Wheezing., Disp: 18 g, Rfl: 11    azelastine (ASTELIN) 0.1 % nasal spray, 1 spray into the nostril(s) as directed by provider As Needed., Disp: , Rfl:     Budeson-Glycopyrrol-Formoterol (Breztri Aerosphere) 160-9-4.8 MCG/ACT aerosol inhaler, Inhale 2 puffs 2 (Two) Times a Day., Disp: 32.1 g, Rfl: 4    Cariprazine HCl (Vraylar) 6 MG capsule, Take 1 capsule by mouth once a day, Disp: 30 capsule, Rfl: 2    cetirizine (zyrTEC) 10 MG tablet, Take 1 tablet by mouth Daily., Disp: 90 tablet, Rfl: 3    cloNIDine (CATAPRES) 0.2 MG tablet, Take 1 tablet by mouth 2 (Two) Times a Day., Disp: 60 tablet, Rfl: 2    cyclobenzaprine (FLEXERIL) 10 MG tablet, Take 1 tablet by mouth 3 (Three) Times a Day As Needed., Disp: 180 tablet, Rfl: 2    desvenlafaxine (Pristiq) 100 MG 24 hr tablet, Take 1 tablet by mouth once a day, Disp: 30 tablet, Rfl: 2    doxepin (SINEquan) 75 MG capsule, Take 1 capsule by mouth at bedtime, Disp: 30 capsule, Rfl: 2    ergocalciferol (ERGOCALCIFEROL) 1.25 MG  (70859 UT) capsule, Take 1 capsule by mouth 2 (Two) Times a Week., Disp: 25 capsule, Rfl: 3    ferrous sulfate 325 (65 FE) MG EC tablet, Take 1 tablet by mouth Daily With Breakfast., Disp: , Rfl:     fluticasone (FLONASE) 50 MCG/ACT nasal spray, SPRAY ONE SPRAY IN EACH NOSTRIL TWO TIMES A DAY (Patient taking differently: 1 spray into the nostril(s) as directed by provider 2 (Two) Times a Day As Needed.), Disp: 16 mL, Rfl: 11    furosemide (LASIX) 20 MG tablet, Take 1 tablet by mouth once daily as needed for swelling, Disp: 30 tablet, Rfl: 2    HYDROcodone-acetaminophen (NORCO) 5-325 MG per tablet, Take 1 tablet by mouth 2 (Two) Times a Day., Disp: 60 tablet, Rfl: 0    hydrOXYzine (ATARAX) 25 MG tablet, Take 1-2 tablet by mouth four times a day as needed, Disp: 120 tablet, Rfl: 2    ipratropium-albuterol (DUO-NEB) 0.5-2.5 mg/3 ml nebulizer, Take 3 mL by nebulization Every 4 (Four) Hours As Needed for Wheezing or Shortness of Air., Disp: 180 mL, Rfl: 5    levothyroxine (SYNTHROID, LEVOTHROID) 112 MCG tablet, Take 1 tablet by mouth Daily., Disp: 90 tablet, Rfl: 0    metFORMIN (GLUCOPHAGE) 500 MG tablet, Take 1 tablet by mouth 2 (Two) Times a Day., Disp: 60 tablet, Rfl: 2    montelukast (SINGULAIR) 10 MG tablet, Take 1 tablet by mouth Every Night., Disp: 90 tablet, Rfl: 3    multivitamin with minerals tablet tablet, 1 tablet Daily., Disp: , Rfl:     predniSONE (DELTASONE) 10 MG tablet, 6 daily x 2 days, 5 daily x 2 days, 4 daily x 2 days, 3 daily x 2 days, 2 daily x 2 days, 1 daily x 2 days, Disp: 42 tablet, Rfl: 0    Semaglutide, 1 MG/DOSE, (OZEMPIC) 4 MG/3ML solution pen-injector, Inject 1 mg under the skin into the appropriate area as directed Every 7 (Seven) Days., Disp: 3 mL, Rfl: 5    simvastatin (ZOCOR) 20 MG tablet, Take 1 tablet by mouth every night at bedtime., Disp: 30 tablet, Rfl: 5    vitamin D (ERGOCALCIFEROL) 1.25 MG (68767 UT) capsule capsule, Take 1 capsule by mouth 2 (Two) Times a Week. Sunday and   "Wednesday, Disp: , Rfl:     dicyclomine (BENTYL) 20 MG tablet, Take 1 tablet by mouth Every 6 (Six) Hours As Needed (abdominal pain and diarrhea)., Disp: 120 tablet, Rfl: 3    hydrocortisone (ANUSOL-HC) 25 MG suppository, Insert 1 suppository into the rectum 2 (Two) Times a Day., Disp: 14 suppository, Rfl: 1    linaclotide (Linzess) 290 MCG capsule capsule, Take 1 capsule by mouth Every Morning Before Breakfast., Disp: 90 capsule, Rfl: 2    pantoprazole (Protonix) 40 MG EC tablet, Take 1 tablet by mouth Daily., Disp: 90 tablet, Rfl: 2     Allergies     Allergies   Allergen Reactions    Vancomycin Other (See Comments) and Unknown - High Severity     BLAIRE, CHEMICAL BURN    LIVER FAILURE    Calcium Channel Blockers Swelling     swelling  swelling      Daptomycin Other (See Comments)     PALPITATIONS HTN  PALPITATIONS HTN      Doxycycline Hyclate Unknown - Low Severity    Lisinopril Other (See Comments) and Unknown - Low Severity     DIZZINESS    Other reaction(s): Unknown    Oxycodone Other (See Comments) and Unknown - Low Severity     Dizziness     Other reaction(s): Unknown    Theophyllines Unknown - Low Severity    Oxycodone-Acetaminophen Itching       Social History       Social History     Social History Narrative    Not on file         Objective       BP (!) 150/102 (BP Location: Left arm, Patient Position: Sitting, Cuff Size: Adult)   Pulse 73   Ht 165.1 cm (65\")   Wt (!) 142 kg (312 lb)   BMI 51.92 kg/m²       Physical Exam    Results       Result Review :    The following data was reviewed by: TAMIA Oakley on 02/07/2024:    CBC w/diff          6/19/2023    07:20 6/20/2023    03:12   CBC w/Diff   WBC 13.17  10.03    RBC 4.79  4.66    Hemoglobin 13.1  12.7    Hematocrit 41.1  39.7    MCV 85.8  85.2    MCH 27.3  27.3    MCHC 31.9  32.0    RDW 16.1  16.1    Platelets 358  359    Neutrophil Rel % 73.9  81.1    Immature Granulocyte Rel % 0.8  1.6    Lymphocyte Rel % 12.8  13.1    Monocyte Rel % 8.4  " 4.2    Eosinophil Rel % 3.6  0.0    Basophil Rel % 0.5  0.0      CMP          6/19/2023    07:20 6/20/2023    03:12   CMP   Glucose 108  173    BUN 11  11    Creatinine 0.78  0.76    EGFR 93.2  96.2    Sodium 139  140    Potassium 3.6  4.4    Chloride 100  101    Calcium 8.8  9.0    Total Protein 7.3  6.9    Albumin 4.1  3.6    Globulin 3.2  3.3    Total Bilirubin 0.2  <0.2    Alkaline Phosphatase 77  73    AST (SGOT) 9  8    ALT (SGPT) 8  10    Albumin/Globulin Ratio 1.3  1.1    BUN/Creatinine Ratio 14.1  14.5    Anion Gap 11.4  11.1                 Assessment and Plan              Diagnoses and all orders for this visit:    1. Chronic idiopathic constipation (Primary)  -     linaclotide (Linzess) 290 MCG capsule capsule; Take 1 capsule by mouth Every Morning Before Breakfast.  Dispense: 90 capsule; Refill: 2    2. Internal hemorrhoids  -     hydrocortisone (ANUSOL-HC) 25 MG suppository; Insert 1 suppository into the rectum 2 (Two) Times a Day.  Dispense: 14 suppository; Refill: 1    3. Gastroesophageal reflux disease, unspecified whether esophagitis present  -     pantoprazole (Protonix) 40 MG EC tablet; Take 1 tablet by mouth Daily.  Dispense: 90 tablet; Refill: 2    4. Irritable bowel syndrome with constipation  -     dicyclomine (BENTYL) 20 MG tablet; Take 1 tablet by mouth Every 6 (Six) Hours As Needed (abdominal pain and diarrhea).  Dispense: 120 tablet; Refill: 3          Follow Up     Follow Up   Return in about 6 months (around 8/7/2024) for GERD, constipation.  Patient was given instructions and counseling regarding her condition or for health maintenance advice. Please see specific information pulled into the AVS if appropriate.

## 2024-02-07 NOTE — PATIENT INSTRUCTIONS
Food Choices for Gastroesophageal Reflux Disease, Adult  When you have gastroesophageal reflux disease (GERD), the foods you eat and your eating habits are very important. Choosing the right foods can help ease your discomfort. Think about working with a food expert (dietitian) to help you make good choices.  What are tips for following this plan?  Reading food labels  Look for foods that are low in saturated fat. Foods that may help with your symptoms include:  Foods that have less than 5% of daily value (DV) of fat.  Foods that have 0 grams of trans fat.  Cooking  Do not angel your food.  Cook your food by baking, steaming, grilling, or broiling. These are all methods that do not need a lot of fat for cooking.  To add flavor, try to use herbs that are low in spice and acidity.  Meal planning    Choose healthy foods that are low in fat, such as:  Fruits and vegetables.  Whole grains.  Low-fat dairy products.  Lean meats, fish, and poultry.  Eat small meals often instead of eating 3 large meals each day. Eat your meals slowly in a place where you are relaxed. Avoid bending over or lying down until 2-3 hours after eating.  Limit high-fat foods such as fatty meats or fried foods.  Limit your intake of fatty foods, such as oils, butter, and shortening.  Avoid the following as told by your doctor:  Foods that cause symptoms. These may be different for different people. Keep a food diary to keep track of foods that cause symptoms.  Alcohol.  Drinking a lot of liquid with meals.  Eating meals during the 2-3 hours before bed.  Lifestyle  Stay at a healthy weight. Ask your doctor what weight is healthy for you. If you need to lose weight, work with your doctor to do so safely.  Exercise for at least 30 minutes on 5 or more days each week, or as told by your doctor.  Wear loose-fitting clothes.  Do not smoke or use any products that contain nicotine or tobacco. If you need help quitting, ask your doctor.  Sleep with the head  of your bed higher than your feet. Use a wedge under the mattress or blocks under the bed frame to raise the head of the bed.  Chew sugar-free gum after meals.  What foods should eat?    Eat a healthy, well-balanced diet of fruits, vegetables, whole grains, low-fat dairy products, lean meats, fish, and poultry. Each person is different.  Foods that may cause symptoms in one person may not cause any symptoms in another person. Work with your doctor to find foods that are safe for you.  The items listed above may not be a complete list of what you can eat and drink. Contact a food expert for more options.  What foods should I avoid?  Limiting some of these foods may help in managing the symptoms of GERD. Everyone is different. Talk with a food expert or your doctor to help you find the exact foods to avoid, if any.  Fruits  Any fruits prepared with added fat. Any fruits that cause symptoms. For some people, this may include citrus fruits, such as oranges, grapefruit, pineapple, and ela.  Vegetables  Deep-fried vegetables. French fries. Any vegetables prepared with added fat. Any vegetables that cause symptoms. For some people, this may include tomatoes and tomato products, chili peppers, onions and garlic, and horseradish.  Grains  Pastries or quick breads with added fat.  Meats and other proteins  High-fat meats, such as fatty beef or pork, hot dogs, ribs, ham, sausage, salami, and madrigal. Fried meat or protein, including fried fish and fried chicken. Nuts and nut butters, in large amounts.  Dairy  Whole milk and chocolate milk. Sour cream. Cream. Ice cream. Cream cheese. Milkshakes.  Fats and oils  Butter. Margarine. Shortening. Ghee.  Beverages  Coffee and tea, with or without caffeine. Carbonated beverages. Sodas. Energy drinks. Fruit juice made with acidic fruits, such as orange or grapefruit. Tomato juice. Alcoholic drinks.  Sweets and desserts  Chocolate and cocoa. Donuts.  Seasonings and condiments  Pepper.  Peppermint and spearmint. Added salt. Any condiments, herbs, or seasonings that cause symptoms. For some people, this may include felton, hot sauce, or vinegar-based salad dressings.  The items listed above may not be a complete list of what you should not eat and drink. Contact a food expert for more options.  Questions to ask your doctor  Diet and lifestyle changes are often the first steps that are taken to manage symptoms of GERD. If diet and lifestyle changes do not help, talk with your doctor about taking medicines.  Where to find more information  [employer]: aboutgerd.org  Summary  When you have GERD, food and lifestyle choices are very important in easing your symptoms.  Eat small meals often instead of 3 large meals a day. Eat your meals slowly and in a place where you are relaxed.  Avoid bending over or lying down until 2-3 hours after eating.  Limit high-fat foods such as fatty meats or fried foods.  This information is not intended to replace advice given to you by your health care provider. Make sure you discuss any questions you have with your health care provider.  Document Revised: 06/28/2021 Document Reviewed: 06/28/2021  Elsevier Patient Education © 2023 [employer]

## 2024-02-27 ENCOUNTER — HOSPITAL ENCOUNTER (OUTPATIENT)
Dept: RESPIRATORY THERAPY | Facility: HOSPITAL | Age: 49
Discharge: HOME | End: 2024-02-27
Payer: COMMERCIAL

## 2024-03-01 RX ORDER — BUDESONIDE, GLYCOPYRROLATE, AND FORMOTEROL FUMARATE 160; 9; 4.8 UG/1; UG/1; UG/1
2 AEROSOL, METERED RESPIRATORY (INHALATION) 2 TIMES DAILY
Qty: 2 EACH | Refills: 0 | COMMUNITY
Start: 2024-03-01 | End: 2024-03-02

## 2024-03-06 RX ORDER — SIMVASTATIN 20 MG
20 TABLET ORAL
Qty: 30 TABLET | Refills: 5 | Status: CANCELLED | OUTPATIENT
Start: 2024-03-05

## 2024-03-16 ENCOUNTER — HOSPITAL ENCOUNTER (EMERGENCY)
Facility: HOSPITAL | Age: 49
Discharge: HOME | End: 2024-03-16
Attending: EMERGENCY MEDICINE
Payer: COMMERCIAL

## 2024-03-16 ENCOUNTER — APPOINTMENT (OUTPATIENT)
Dept: GENERAL RADIOLOGY | Facility: HOSPITAL | Age: 49
End: 2024-03-16
Payer: COMMERCIAL

## 2024-03-16 VITALS
HEIGHT: 62 IN | OXYGEN SATURATION: 97 % | BODY MASS INDEX: 53.92 KG/M2 | HEART RATE: 98 BPM | SYSTOLIC BLOOD PRESSURE: 144 MMHG | WEIGHT: 293 LBS | DIASTOLIC BLOOD PRESSURE: 83 MMHG | TEMPERATURE: 97.5 F | RESPIRATION RATE: 20 BRPM

## 2024-03-16 DIAGNOSIS — M54.6 ACUTE MIDLINE THORACIC BACK PAIN: Primary | ICD-10-CM

## 2024-03-16 PROCEDURE — 25010000002 DEXAMETHASONE PER 1 MG: Performed by: NURSE PRACTITIONER

## 2024-03-16 PROCEDURE — 72072 X-RAY EXAM THORAC SPINE 3VWS: CPT

## 2024-03-16 PROCEDURE — 25010000002 KETOROLAC TROMETHAMINE PER 15 MG: Performed by: NURSE PRACTITIONER

## 2024-03-16 PROCEDURE — 99283 EMERGENCY DEPT VISIT LOW MDM: CPT

## 2024-03-16 PROCEDURE — 96372 THER/PROPH/DIAG INJ SC/IM: CPT

## 2024-03-16 RX ORDER — METHOCARBAMOL 750 MG/1
750 TABLET, FILM COATED ORAL ONCE
Status: COMPLETED | OUTPATIENT
Start: 2024-03-16 | End: 2024-03-16

## 2024-03-16 RX ORDER — METHOCARBAMOL 750 MG/1
750 TABLET, FILM COATED ORAL 3 TIMES DAILY PRN
Qty: 30 TABLET | Refills: 0 | Status: SHIPPED | OUTPATIENT
Start: 2024-03-16

## 2024-03-16 RX ORDER — DICLOFENAC SODIUM 75 MG/1
75 TABLET, DELAYED RELEASE ORAL 2 TIMES DAILY
Qty: 30 TABLET | Refills: 0 | Status: SHIPPED | OUTPATIENT
Start: 2024-03-16

## 2024-03-16 RX ORDER — HYDROCODONE BITARTRATE AND ACETAMINOPHEN 7.5; 325 MG/1; MG/1
1 TABLET ORAL ONCE
Status: COMPLETED | OUTPATIENT
Start: 2024-03-16 | End: 2024-03-16

## 2024-03-16 RX ORDER — METHYLPREDNISOLONE 4 MG/1
TABLET ORAL
Qty: 21 TABLET | Refills: 0 | Status: SHIPPED | OUTPATIENT
Start: 2024-03-16

## 2024-03-16 RX ORDER — KETOROLAC TROMETHAMINE 30 MG/ML
60 INJECTION, SOLUTION INTRAMUSCULAR; INTRAVENOUS ONCE
Status: COMPLETED | OUTPATIENT
Start: 2024-03-16 | End: 2024-03-16

## 2024-03-16 RX ADMIN — METHOCARBAMOL TABLETS 750 MG: 750 TABLET, COATED ORAL at 04:14

## 2024-03-16 RX ADMIN — KETOROLAC TROMETHAMINE 60 MG: 30 INJECTION, SOLUTION INTRAMUSCULAR at 04:13

## 2024-03-16 RX ADMIN — DEXAMETHASONE SODIUM PHOSPHATE 10 MG: 10 INJECTION INTRAMUSCULAR; INTRAVENOUS at 04:14

## 2024-03-16 RX ADMIN — HYDROCODONE BITARTRATE AND ACETAMINOPHEN 1 TABLET: 7.5; 325 TABLET ORAL at 04:18

## 2024-03-16 NOTE — Clinical Note
[facility] EMERGENCY ROOM  [address] [place] [address]  Phone: [phone]  Fax: [phone]    Jaycee Le was seen and treated in our emergency department on 3/16/2024.  She may return to work on 03/18/2024.         Thank you for choosing [facility].    Rebeka Dan APRN

## 2024-03-16 NOTE — ED PROVIDER NOTES
Time: 3:24 AM EDT  Date of encounter:  3/16/2024  Independent Historian/Clinical History and Information was obtained by:   Patient    History is limited by: N/A    Chief Complaint: MID BACK PAIN      History of Present Illness:      The patient presents to the emergency department and states that for the last 3 days she has had worsening mid back pain.  She states she does have chronic mid back pain and has recently just started seeing [facility] pain management and has an MRI scheduled for Tuesday.  She states that she has not had any known injuries or done any activities that she is aware of that would have caused her pain to be worse than normal and last longer.  She denies any previous back surgeries.  She states that she has had no numbness or tingling.  She does have mid vertebral tenderness in her T-spine states the pain then radiates over to her left scapular area.  She denies any cough or congestion or recent fevers.  She denies any bladder or bowel incontinence.      History provided by:  Patient   used: No        Patient Care Team  Primary Care Provider: Jigna Warren APRN    Past Medical History:     Allergies   Allergen Reactions    Vancomycin Other (See Comments) and Unknown - High Severity     BLAIRE, CHEMICAL BURN    LIVER FAILURE    Calcium Channel Blockers Swelling     swelling  swelling      Daptomycin Other (See Comments)     PALPITATIONS HTN  PALPITATIONS HTN      Doxycycline Hyclate Unknown - Low Severity    Lisinopril Other (See Comments) and Unknown - Low Severity     DIZZINESS    Other reaction(s): Unknown    Oxycodone Other (See Comments) and Unknown - Low Severity     Dizziness     Other reaction(s): Unknown    Theophyllines Unknown - Low Severity    Oxycodone-Acetaminophen Itching     Past Medical History:   Diagnosis Date    Abnormal Pap smear of cervix     Achilles tendon rupture 09/15/2015    Anxiety     Apnea     Arthritis     Asthma     Bilateral foot pain  2019    Bipolar disorder     Broken bones     Bunion     Chronic bronchitis     Clot 2016    COPD (chronic obstructive pulmonary disease)     Depression     Family history of migraine headaches     Foot ulcer     Forgetfulness     GERD (gastroesophageal reflux disease)     Magalie's deformity, right 2015    HBP (high blood pressure)     HBP (high blood pressure)     Heart murmur     Heel pain     Hemorrhoids     HPV (human papilloma virus) infection     Hyperlipidemia     Hypertension     Hypothyroidism     IBS (irritable bowel syndrome)     Kidney disease     Knee pain     Migraine     Night sweats     Numbness in feet     Obesity     Patella, chondromalacia 2015    Patellar maltracking 2015    Plantar fascial fibromatosis of both feet     Plantar wart     Polycystic ovarian syndrome     Sinus trouble     SOB (shortness of breath)      Past Surgical History:   Procedure Laterality Date    ACHILLES TENDON REPAIR       SECTION  .    COLONOSCOPY  2020    ENDOSCOPY  2020    LASER ABLATION      UTERINE ABLATION    TUBAL ABDOMINAL LIGATION      UPPER GASTROINTESTINAL ENDOSCOPY       Family History   Problem Relation Age of Onset    Diabetes Mother     Ovarian cancer Mother 64    Hypertension Mother     Prostate cancer Father     Hypertension Father     Heart disease Father     Hypertension Sister     Hypertension Brother     Diabetes Maternal Aunt     Breast cancer Paternal Aunt     Breast cancer Paternal Aunt     Breast cancer Paternal Aunt     Throat cancer Maternal Grandmother     Lung cancer Maternal Grandfather     Breast cancer Paternal Grandmother     Uterine cancer Neg Hx     Melanoma Neg Hx     Colon cancer Neg Hx        Home Medications:  Prior to Admission medications    Medication Sig Start Date End Date Taking? Authorizing Provider   albuterol sulfate  (90 Base) MCG/ACT inhaler Inhale 2 puffs Every 4 (Four) Hours As Needed for Wheezing. 10/11/23   [place]st  TAMIA Langford   azelastine (ASTELIN) 0.1 % nasal spray 1 spray into the nostril(s) as directed by provider As Needed. 7/1/21   Prakash Baez MD   Budeson-Glycopyrrol-Formoterol (Breztri Aerosphere) 160-9-4.8 MCG/ACT aerosol inhaler Inhale 2 puffs 2 (Two) Times a Day. 8/14/23   Anastasiya Dukes APRN   Cariprazine HCl (Vraylar) 6 MG capsule Take 1 capsule by mouth once a day 1/30/24      cetirizine (zyrTEC) 10 MG tablet Take 1 tablet by mouth Daily. 12/22/23   Damaris Knowles APRN   cyclobenzaprine (FLEXERIL) 10 MG tablet Take 1 tablet by mouth 3 (Three) Times a Day As Needed. 1/3/24      desvenlafaxine (Pristiq) 100 MG 24 hr tablet Take 1 tablet by mouth once a day 1/30/24      dicyclomine (BENTYL) 20 MG tablet Take 1 tablet by mouth Every 6 (Six) Hours As Needed (abdominal pain and diarrhea). 2/7/24   Roxy Bowens APRN   doxepin (SINEquan) 75 MG capsule Take 1 capsule by mouth at bedtime 1/30/24      ergocalciferol (ERGOCALCIFEROL) 1.25 MG (52740 UT) capsule Take 1 capsule by mouth 2 (Two) Times a Week. 7/20/23      ferrous sulfate 325 (65 FE) MG EC tablet Take 1 tablet by mouth Daily With Breakfast. 6/10/23   Prakash Baez MD   ferrous sulfate 325 (65 FE) MG EC tablet 1 tab(s) orally 3 times a day 90 days 3/7/24      fluticasone (FLONASE) 50 MCG/ACT nasal spray SPRAY ONE SPRAY IN EACH NOSTRIL TWO TIMES A DAY  Patient taking differently: 1 spray into the nostril(s) as directed by provider 2 (Two) Times a Day As Needed. 9/22/21   Torsten Christensen MD   furosemide (LASIX) 20 MG tablet Take 1 tablet by mouth once daily as needed for swelling 1/25/24      hydroCHLOROthiazide 25 MG tablet Take 1 tablet by mouth Daily. 2/21/24      HYDROcodone-acetaminophen (NORCO) 5-325 MG per tablet 1 tablet by mouth 2 times a day 30 days 3/13/24      hydrocortisone (ANUSOL-HC) 25 MG suppository Insert 1 suppository into the rectum 2 (Two) Times a Day. 2/7/24   Roxy Bowens APRN   hydrOXYzine (ATARAX) 25 MG  tablet Take 1-2 tablet by mouth four times a day as needed 1/30/24      ipratropium-albuterol (DUO-NEB) 0.5-2.5 mg/3 ml nebulizer Take 3 mL by nebulization Every 4 (Four) Hours As Needed for Wheezing or Shortness of Air. 6/23/23   Lena Us APRN   levothyroxine (SYNTHROID, LEVOTHROID) 112 MCG tablet Take 1 tablet by mouth Daily. 10/9/23      levothyroxine (SYNTHROID, LEVOTHROID) 112 MCG tablet 1 tab(s) orally once a day 90 days 2/15/24      linaclotide (Linzess) 290 MCG capsule capsule Take 1 capsule by mouth Every Morning Before Breakfast. 2/7/24   Roxy Bowens APRN   losartan (COZAAR) 50 MG tablet 1 tab(s) orally once a day 30 day(s) 2/13/24      metFORMIN (GLUCOPHAGE) 500 MG tablet Take 1 tablet by mouth 2 (Two) Times a Day. 7/11/23      montelukast (SINGULAIR) 10 MG tablet Take 1 tablet by mouth Every Night. 12/22/23   Damaris Knowles APRN   multivitamin with minerals tablet tablet 1 tablet Daily.    Provider, MD Prakash   pantoprazole (Protonix) 40 MG EC tablet Take 1 tablet by mouth Daily. 2/7/24   Roxy Bowens APRN   predniSONE (DELTASONE) 10 MG tablet 6 daily x 2 days, 5 daily x 2 days, 4 daily x 2 days, 3 daily x 2 days, 2 daily x 2 days, 1 daily x 2 days 12/22/23   Damaris Knowles APRN   Semaglutide, 1 MG/DOSE, (OZEMPIC) 4 MG/3ML solution pen-injector Inject 1 mg under the skin into the appropriate area as directed Every 7 (Seven) Days. 1/26/24      Semaglutide, 2 MG/DOSE, (Ozempic, 2 MG/DOSE,) 8 MG/3ML solution pen-injector Inject 2 mg subcutaneously once a week 2/21/24      simvastatin (ZOCOR) 20 MG tablet Take 1 tablet by mouth every night at bedtime. 6/28/23      simvastatin (ZOCOR) 20 MG tablet 1 tab(s) orally once a day (at bedtime) 30 day(s) 3/7/24      vitamin D (ERGOCALCIFEROL) 1.25 MG (33049 UT) capsule capsule Take 1 capsule by mouth 2 (Two) Times a Week. Sunday and  Wednesday 10/1/22   Provider, MD Prakash   cloNIDine (CATAPRES) 0.2 MG tablet Take 1 tablet  "by mouth 3 times a day. 2/7/24 3/5/24          Social History:   Social History     Tobacco Use    Smoking status: Every Day     Current packs/day: 0.25     Average packs/day: 0.3 packs/day for 25.0 years (6.3 ttl pk-yrs)     Types: Cigarettes    Smokeless tobacco: Never   Vaping Use    Vaping status: Never Used   Substance Use Topics    Alcohol use: Yes     Comment: LIGHT    Drug use: Not Currently     Comment: FORMER         Review of Systems:  Review of Systems   Constitutional:  Negative for chills and fever.   HENT:  Negative for congestion, ear pain and sore throat.    Eyes:  Negative for pain.   Respiratory:  Negative for cough, chest tightness and shortness of breath.    Cardiovascular:  Negative for chest pain.   Gastrointestinal:  Negative for abdominal pain, diarrhea, nausea and vomiting.   Genitourinary:  Negative for flank pain and hematuria.   Musculoskeletal:  Positive for back pain. Negative for joint swelling, neck pain and neck stiffness.   Skin:  Negative for pallor and rash.   Neurological:  Negative for seizures and headaches.   All other systems reviewed and are negative.       Physical Exam:  /83   Pulse 98   Temp 97.5 °F (36.4 °C)   Resp 20   Ht 157.5 cm (62\")   Wt (!) 142 kg (313 lb 0.9 oz)   SpO2 97%   BMI 57.26 kg/m²     Physical Exam  Vitals and nursing note reviewed.   Constitutional:       General: She is not in acute distress.     Appearance: Normal appearance. She is not ill-appearing or toxic-appearing.   HENT:      Head: Normocephalic and atraumatic.   Eyes:      General: No scleral icterus.  Cardiovascular:      Rate and Rhythm: Normal rate and regular rhythm.      Pulses: Normal pulses.   Pulmonary:      Effort: Pulmonary effort is normal. No respiratory distress.      Breath sounds: Normal breath sounds. No wheezing.   Musculoskeletal:         General: Normal range of motion.      Cervical back: Normal range of motion and neck supple. No rigidity or tenderness. "   Lymphadenopathy:      Cervical: No cervical adenopathy.   Skin:     General: Skin is warm and dry.      Capillary Refill: Capillary refill takes less than 2 seconds.      Findings: No rash.   Neurological:      General: No focal deficit present.      Mental Status: She is alert and oriented to person, place, and time. Mental status is at baseline.   Psychiatric:         Mood and Affect: Mood normal.         Behavior: Behavior normal.                Procedures:  Procedures      Medical Decision Making:      Comorbidities that affect care:    Achilles tendon rupture, apnea, asthma, chronic bronchitis, COPD, bilateral foot pain, forgetfulness, heel pain, high blood pressure, hyperlipidemia, hypothyroidism, kidney disease, obesity, polycystic ovarian disease, HPV, anxiety, arthritis, bipolar, bunion, colitis, depression, foot ulcer, GERD, heart murmur, hemorrhoids, IBS, neuropathy, knee pain, plantar wart, abnormal Pap smear of the cervix, migraines hypothyroidis    External Notes reviewed:    None      The following orders were placed and all results were independently analyzed by me:  Orders Placed This Encounter   Procedures    XR Spine Thoracic 3 View       Medications Given in the Emergency Department:  Medications   ketorolac (TORADOL) injection 60 mg (60 mg Intramuscular Given 3/16/24 0413)   methocarbamol (ROBAXIN) tablet 750 mg (750 mg Oral Given 3/16/24 0414)   dexAMETHasone (DECADRON) 10 MG/ML oral solution 10 mg (10 mg Oral Given 3/16/24 0414)   HYDROcodone-acetaminophen (NORCO) 7.5-325 MG per tablet 1 tablet (1 tablet Oral Given 3/16/24 0418)        ED Course:         Labs:    Lab Results (last 24 hours)       ** No results found for the last 24 hours. **             Imaging:    XR Spine Thoracic 3 View    Result Date: 3/16/2024  PROCEDURE: XR SPINE THORACIC 3 VW  COMPARISON: [facility], PORSCHE, T-SPINE-AP-LAT-SWIMMERS, 9/13/2013, 21:39.  INDICATIONS: Back pain for 3 days.  No trauma  FINDINGS:   No fracture or subluxation is identified.  There is degenerative endplate spurring throughout the thoracic spine that does appear progressively worsened from the prior study.  No bone erosion or destruction.       Progression of degenerative endplate spurring.  No fracture or malalignment.     REINIER PEACE MD       Electronically Signed and Approved By: REINIER PEACE MD on 3/16/2024 at 4:18                Differential Diagnosis and Discussion:    Back Pain: The patient presents with back pain. My differential diagnosis includes but is not limited to acute spinal epidural abscess, acute spinal epidural bleed, cauda equina syndrome, abdominal aortic aneurysm, aortic dissection, kidney stone, pyelonephritis, musculoskeletal back pain, spinal fracture, and osteoarthritis.     All X-rays impressions were independently interpreted by me.    MDM     Amount and/or Complexity of Data Reviewed  Tests in the radiology section of CPT®: reviewed         Patient Care Considerations:    NARCOTICS: I considered prescribing opiate pain medication as an outpatient, however [facility]      Consultants/Shared Management Plan:    None    Social Determinants of Health:    Patient is independent, reliable, and has access to care.       Disposition and Care Coordination:    Discharged: The patient is suitable and stable for discharge with no need for consideration of admission.    I have explained the patient´s condition, diagnoses and treatment plan based on the information available to me at this time. I have answered questions and addressed any concerns. The patient has a good  understanding of the patient´s diagnosis, condition, and treatment plan as can be expected at this point. The vital signs have been stable. The patient´s condition is stable and appropriate for discharge from the emergency department.      The patient will pursue further outpatient evaluation with the  primary care physician or other designated or consulting physician as outlined in the discharge instructions. They are agreeable to this plan of care and follow-up instructions have been explained in detail. The patient has received these instructions in written format and has expressed an understanding of the discharge instructions. The patient is aware that any significant change in condition or worsening of symptoms should prompt an immediate return to this or the closest emergency department or call to 911.  I have explained discharge medications and the need for follow up with the patient/caretakers. This was also printed in the discharge instructions. Patient was discharged with the following medications and follow up:      Medication List        New Prescriptions      diclofenac 75 MG EC tablet  Commonly known as: VOLTAREN  Take 1 tablet by mouth 2 (Two) Times a Day.     methocarbamol 750 MG tablet  Commonly known as: ROBAXIN  Take 1 tablet by mouth 3 (Three) Times a Day As Needed for Muscle Spasms.     methylPREDNISolone 4 MG dose pack  Commonly known as: MEDROL  Take as directed on package instructions.            Changed      fluticasone 50 MCG/ACT nasal spray  Commonly known as: FLONASE  SPRAY ONE SPRAY IN EACH NOSTRIL TWO TIMES A DAY  What changed: See the new instructions.               Where to Get Your Medications        These medications were sent to [facility]/pharmacy #[id number] - [place], [place] - [address] - [phone]  - [phone]  [address] [place] [address]      Hours: 24-hours Phone: [phone]   diclofenac 75 MG EC tablet  methocarbamol 750 MG tablet  methylPREDNISolone 4 MG dose pack      Jigna Warren, APRN  [address]  [address]  [place] [address]  [phone]    Call   FOR FOLLOW UP    [facility]  [address]  [facility] [address]  [phone]    AS SCHEDULED, FOR FOLLOW UP       Final diagnoses:   Acute  midline thoracic back pain        ED Disposition       None            This medical record created using voice recognition software.             Rebeka Dan, APRN  03/16/24 0434

## 2024-03-16 NOTE — DISCHARGE INSTRUCTIONS
Rest, gentle range of motion stretches followed by 20 minutes of ice to the area several times a day.  Take your meds as prescribed.  Do not take any of your old Flexeril along with the Robaxin.  [facility] and have your MRI for further evaluation for your chronic back pain.  Follow-up with your family doctor next week for reevaluation and further treatment as necessary.  Return to the emergency department immediately for any acutely developing neurological symptoms, any shortness of breath or chest pain, any fevers of 101 or greater or any new or worse concerns.

## 2024-03-26 RX ORDER — BUDESONIDE, GLYCOPYRROLATE, AND FORMOTEROL FUMARATE 160; 9; 4.8 UG/1; UG/1; UG/1
2 AEROSOL, METERED RESPIRATORY (INHALATION) 2 TIMES DAILY
Qty: 10.7 G | Refills: 5 | Status: SHIPPED | OUTPATIENT
Start: 2024-03-26

## 2024-04-22 DIAGNOSIS — J44.89 ASTHMA-COPD OVERLAP SYNDROME: ICD-10-CM

## 2024-04-22 DIAGNOSIS — J45.50 SEVERE PERSISTENT ASTHMA, UNSPECIFIED WHETHER COMPLICATED: ICD-10-CM

## 2024-04-23 RX ORDER — ALBUTEROL SULFATE 90 UG/1
2 AEROSOL, METERED RESPIRATORY (INHALATION) EVERY 4 HOURS PRN
Qty: 18 G | Refills: 11 | Status: SHIPPED | OUTPATIENT
Start: 2024-04-23

## 2024-04-26 ENCOUNTER — TELEPHONE (OUTPATIENT)
Dept: OBSTETRICS AND GYNECOLOGY | Facility: CLINIC | Age: 49
End: 2024-04-26
Payer: COMMERCIAL

## 2024-04-26 NOTE — TELEPHONE ENCOUNTER
"Caller: Jaycee Le \"Viktoriya\"    Relationship: Self    Best call back number: [phone]    What orders are you requesting (i.e. lab or imaging): IMAGING    In what timeframe would the patient need to come in: ASAP    Where will you receive your lab/imaging services: [facility]    Additional notes: PT FOUND A LUMP  IN LEFT BREAST WANTS MAMMOGRAM ORDER SENT TO [facility].      "

## 2024-04-29 ENCOUNTER — TELEPHONE (OUTPATIENT)
Dept: OBSTETRICS AND GYNECOLOGY | Facility: CLINIC | Age: 49
End: 2024-04-29
Payer: COMMERCIAL

## 2024-04-29 NOTE — TELEPHONE ENCOUNTER
Patient contacted and advised to keep scheduled appointment for evaluation. See other phone encounter from same date.

## 2024-04-29 NOTE — TELEPHONE ENCOUNTER
"  Caller: Jaycee Le ELIZA \"Viktoriya\"    Relationship: Self    Best call back number: [phone]    What is the best time to reach you: ANY    Who are you requesting to speak with (clinical staff, provider,  specific staff member): DAMARI    Do you know the name of the person who called: DAMARI    What was the call regarding: BREAST LUMP,  PT IS OFF TODAY, DECLINED APPT FOR TOMORROW, NA AT OFFICE TO SEE IF ANY SAME DAY APPTS. SHE ALSO HAS WED MAY 1 OFF, SO I SCHEDULED AN APPT WITH DANA AT [place]    Is it okay if the provider responds through MyChart:   YES    "

## 2024-04-29 NOTE — TELEPHONE ENCOUNTER
Patient called back and was scheduled for an appointment. Patient called and advised to keep scheduled appointment for evaluation. See other phone encounter from same date.

## 2024-05-01 ENCOUNTER — OFFICE VISIT (OUTPATIENT)
Dept: OBSTETRICS AND GYNECOLOGY | Facility: CLINIC | Age: 49
End: 2024-05-01
Payer: COMMERCIAL

## 2024-05-01 VITALS
DIASTOLIC BLOOD PRESSURE: 98 MMHG | WEIGHT: 293 LBS | HEIGHT: 62 IN | HEART RATE: 102 BPM | SYSTOLIC BLOOD PRESSURE: 168 MMHG | BODY MASS INDEX: 53.92 KG/M2

## 2024-05-01 DIAGNOSIS — N63.21 MASS OF UPPER OUTER QUADRANT OF LEFT BREAST: Primary | ICD-10-CM

## 2024-05-07 RX ORDER — LEVOTHYROXINE SODIUM 112 UG/1
112 TABLET ORAL DAILY
Qty: 90 TABLET | Refills: 0 | Status: CANCELLED | OUTPATIENT
Start: 2024-04-28

## 2024-05-29 RX ORDER — LOSARTAN POTASSIUM 50 MG/1
TABLET ORAL
Qty: 30 TABLET | Refills: 2 | Status: CANCELLED | OUTPATIENT
Start: 2024-05-29

## 2024-05-29 RX ORDER — LEVOTHYROXINE SODIUM 112 UG/1
TABLET ORAL
Qty: 90 TABLET | Refills: 0 | Status: CANCELLED | OUTPATIENT
Start: 2024-05-29

## 2024-05-31 ENCOUNTER — HOSPITAL ENCOUNTER (OUTPATIENT)
Dept: MAMMOGRAPHY | Facility: HOSPITAL | Age: 49
Discharge: HOME | End: 2024-05-31
Payer: COMMERCIAL

## 2024-05-31 ENCOUNTER — HOSPITAL ENCOUNTER (OUTPATIENT)
Dept: ULTRASOUND IMAGING | Facility: HOSPITAL | Age: 49
Discharge: HOME | End: 2024-05-31
Payer: COMMERCIAL

## 2024-05-31 DIAGNOSIS — N63.21 MASS OF UPPER OUTER QUADRANT OF LEFT BREAST: ICD-10-CM

## 2024-05-31 PROCEDURE — G0279 TOMOSYNTHESIS, MAMMO: HCPCS

## 2024-05-31 PROCEDURE — 76642 ULTRASOUND BREAST LIMITED: CPT

## 2024-05-31 PROCEDURE — 77066 DX MAMMO INCL CAD BI: CPT

## 2024-06-03 DIAGNOSIS — R92.8 ABNORMAL MAMMOGRAM: Primary | ICD-10-CM

## 2024-08-07 ENCOUNTER — TELEPHONE (OUTPATIENT)
Dept: GASTROENTEROLOGY | Facility: CLINIC | Age: 49
End: 2024-08-07

## 2024-08-07 NOTE — TELEPHONE ENCOUNTER
I  contacted Jaycee Le 1975 regarding the appointment no show with TAMIA Phelan on 08/072024@9:00. Patient is aware that there is a 24-hour cancellation policy and understands that a no-show letter will be mailed to them at the address on file.The patient has  rescheduled/ to 8/22/24@9:00.

## 2024-08-13 RX ORDER — ERGOCALCIFEROL 1.25 MG/1
50000 CAPSULE ORAL 2 TIMES WEEKLY
Qty: 25 CAPSULE | Refills: 3 | Status: CANCELLED | OUTPATIENT
Start: 2024-08-01

## 2024-08-23 ENCOUNTER — TELEPHONE (OUTPATIENT)
Dept: PULMONOLOGY | Facility: CLINIC | Age: 49
End: 2024-08-23
Payer: COMMERCIAL

## 2024-08-23 NOTE — TELEPHONE ENCOUNTER
I have received 3 packets for pt to receive FMLA.  Pt hasn't been seen in 8 months and will need an appt if she is still needing this.  I have called once but no answer.  Will you please call and see if she this has already been done by her PCP.  IF not, and she wants us to do it, she will need an appt.  Please let me know if she is getting an appt or going through PCP.

## 2024-08-26 ENCOUNTER — TELEPHONE (OUTPATIENT)
Dept: PULMONOLOGY | Facility: CLINIC | Age: 49
End: 2024-08-26

## 2024-08-26 NOTE — TELEPHONE ENCOUNTER
PT CALLED IN STATING THAT SHE WAS DISCONNECTED AND WAS CALLING BACK IN FOR MA. PLEASE CALL BACK AND ADVISE.

## 2024-08-28 RX ORDER — FUROSEMIDE 20 MG
TABLET ORAL
Qty: 30 TABLET | Refills: 2 | Status: CANCELLED | OUTPATIENT
Start: 2024-08-27

## 2024-08-28 RX ORDER — LEVOTHYROXINE SODIUM 112 UG/1
112 TABLET ORAL DAILY
Qty: 90 TABLET | Refills: 0 | Status: CANCELLED | OUTPATIENT
Start: 2024-08-27

## 2024-08-30 ENCOUNTER — HOSPITAL ENCOUNTER (OUTPATIENT)
Dept: RESPIRATORY THERAPY | Facility: HOSPITAL | Age: 49
Discharge: HOME | End: 2024-08-30
Payer: COMMERCIAL

## 2024-09-06 NOTE — PROGRESS NOTES
Primary Care Provider  Jigna Warren APRN   Referring Provider  No ref. provider found    Patient Complaint  COPD, Follow-up, and Shortness of Breath      Subjective       History of Presenting Illness  Jaycee Le is a pleasant 48 y.o. female who presents to [facility] for follow-up appointment.  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  Patient has a history of asthma COPD overlap, LUCIANO on CPAP.  Patient continues on Breztri, albuterol, cetirizine, azelastine, Flonase, and Singulair.  She is also on Protonix for GERD.  Patient does continue to smoke and is currently down to less than half a pack a day.  She states she has smoked for the past 25 years but in the past 5 years has cut down to less than a pack a day.  She states that even at 1 time she was smoking 2 packs a day.  She is unable to take Chantix due to other medication interactions.  Her compliance report on her CPAP shows an AHI of 0.6.  She has 100% compliance for the past 30 days. She continues to have shortness of air with exertional activities of moderate severity but improves with rest and her albuterol inhaler.  States that she has been using up to twice a day.  She is currently not on oxygen.  Patient denies coughing, wheezing, headaches, chest pain, weight loss or hemoptysis. Patient denies fevers, chills and night sweats. Jaycee Le is able to perform ADLs without difficulties.    I have personally reviewed the review of systems, past family, social, medical and surgical histories; and agree with their findings.      Review of Systems   Constitutional: Negative.    HENT: Negative.     Respiratory:  Positive for shortness of breath.    Cardiovascular: Negative.    Musculoskeletal: Negative.    Neurological: Negative.    Psychiatric/Behavioral: Negative.           Family History   Problem Relation Age of Onset    Diabetes Mother      Ovarian cancer Mother 64    Hypertension Mother     Prostate cancer Father     Hypertension Father     Heart disease Father     Hypertension Sister     Hypertension Brother     Diabetes Maternal Aunt     Breast cancer Paternal Aunt     Breast cancer Paternal Aunt     Breast cancer Paternal Aunt     Throat cancer Maternal Grandmother     Lung cancer Maternal Grandfather     Breast cancer Paternal Grandmother     Uterine cancer Neg Hx     Melanoma Neg Hx     Colon cancer Neg Hx         Social History     Socioeconomic History    Marital status: Single    Number of children: 2   Tobacco Use    Smoking status: Every Day     Current packs/day: 0.25     Average packs/day: 0.3 packs/day for 25.0 years (6.3 ttl pk-yrs)     Types: Cigarettes    Smokeless tobacco: Never   Vaping Use    Vaping status: Never Used   Substance and Sexual Activity    Alcohol use: Yes     Comment: LIGHT    Drug use: Not Currently     Comment: FORMER    Sexual activity: Not Currently     Birth control/protection: Tubal ligation        Past Medical History:   Diagnosis Date    Abnormal Pap smear of cervix     Achilles tendon rupture 09/15/2015    Anxiety     Apnea     Arthritis     Asthma     Bilateral foot pain 02/04/2019    Bipolar disorder     Broken bones     Bunion     Chronic bronchitis     Clot 01/2016    COPD (chronic obstructive pulmonary disease)     Depression     Family history of migraine headaches     Foot ulcer     Forgetfulness     GERD (gastroesophageal reflux disease)     Magalie's deformity, 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    HBP (high blood pressure)     HBP (high blood pressure)     Heart murmur     Heel pain     Hemorrhoids     HPV (human papilloma virus) infection     Hyperlipidemia     Hypertension     Hypothyroidism     IBS (irritable bowel syndrome)     Kidney disease     Knee pain     Migraine     Night sweats     Numbness in feet     Obesity     Patella, chondromalacia 08/25/2015    Patellar maltracking 08/25/2015    Plantar fascial  fibromatosis of both feet     Plantar wart     Polycystic ovarian syndrome     Sinus trouble     SOB (shortness of breath)         Immunization History   Administered Date(s) Administered    COVID-19 (PFIZER) Purple Cap Monovalent 08/07/2021, 08/28/2021    Flu Vaccine Split Quad 11/23/2015, 10/21/2016, 09/12/2017, 09/17/2018    Fluzone  >6mos 10/03/2014    Fluzone (or Fluarix & Flulaval for VFC) >6mos 10/03/2014, 11/23/2015, 10/21/2016, 09/12/2017, 09/17/2018    Hepatitis A 09/17/2018    Influenza Inj MDCK Preserative Free 08/13/2024    Influenza Injectable Mdck Pf Quad 09/23/2021, 10/09/2023    Influenza Seasonal Injectable 11/19/2009, 11/15/2013    Influenza, Unspecified 09/17/2018, 09/13/2020    Pneumococcal Polysaccharide (PPSV23) 11/19/2009, 10/08/2014    Tdap 11/13/2009, 10/21/2020       Allergies   Allergen Reactions    Vancomycin Other (See Comments) and Unknown - High Severity     BLAIRE, CHEMICAL BURN    LIVER FAILURE    Calcium Channel Blockers Swelling     swelling  swelling      Daptomycin Other (See Comments)     PALPITATIONS HTN  PALPITATIONS HTN      Doxycycline Hyclate Unknown - Low Severity    Lisinopril Other (See Comments) and Unknown - Low Severity     DIZZINESS    Other reaction(s): Unknown    Oxycodone Other (See Comments) and Unknown - Low Severity     Dizziness     Other reaction(s): Unknown    Theophyllines Unknown - Low Severity    Oxycodone-Acetaminophen Itching          Current Outpatient Medications:     albuterol sulfate  (90 Base) MCG/ACT inhaler, Inhale 2 puffs Every 4 (Four) Hours As Needed for Wheezing., Disp: 18 g, Rfl: 11    azelastine (ASTELIN) 0.1 % nasal spray, 1 spray into the nostril(s) as directed by provider As Needed., Disp: , Rfl:     Budeson-Glycopyrrol-Formoterol (Breztri Aerosphere) 160-9-4.8 MCG/ACT aerosol inhaler, Inhale 2 puffs 2 (Two) Times a Day., Disp: 10.7 g, Rfl: 5    Cariprazine HCl (Vraylar) 6 MG capsule, take 1 Capsule by mouth daily, Disp: 30 capsule,  Rfl: 2    cetirizine (zyrTEC) 10 MG tablet, Take 1 tablet by mouth Daily., Disp: 90 tablet, Rfl: 1    clobetasol propionate (TEMOVATE) 0.05 % cream, apply topically as directed 2 times a day for 14 day(s), Disp: 30 g, Rfl: 5    Dextromethorphan-buPROPion ER (Auvelity)  MG tablet controlled-release, take 1 Tablet by mouth twice daily, Disp: 60 tablet, Rfl: 2    dicyclomine (BENTYL) 20 MG tablet, Take 1 tablet by mouth Every 6 (Six) Hours As Needed (abdominal pain and diarrhea)., Disp: 120 tablet, Rfl: 3    doxepin (SINEquan) 75 MG capsule, take 1 Capsule by mouth at bedtime, Disp: 30 capsule, Rfl: 2    ergocalciferol (ERGOCALCIFEROL) 1.25 MG (90254 UT) capsule, Take 1 capsule by mouth 2 (Two) Times a Week., Disp: 25 capsule, Rfl: 3    ferrous sulfate 325 (65 FE) MG EC tablet, 1 tab(s) orally 3 times a day 90 days, Disp: 270 tablet, Rfl: 0    fluticasone (FLONASE) 50 MCG/ACT nasal spray, SPRAY ONE SPRAY IN EACH NOSTRIL TWO TIMES A DAY (Patient taking differently: 1 spray into the nostril(s) as directed by provider 2 (Two) Times a Day As Needed.), Disp: 16 mL, Rfl: 11    hydroCHLOROthiazide 25 MG tablet, Take 1 tablet by mouth Daily., Disp: 30 tablet, Rfl: 2    ipratropium-albuterol (DUO-NEB) 0.5-2.5 mg/3 ml nebulizer, Take 3 mL by nebulization Every 4 (Four) Hours As Needed for Wheezing or Shortness of Air., Disp: 180 mL, Rfl: 5    levothyroxine (SYNTHROID, LEVOTHROID) 112 MCG tablet, Take 1 tablet by mouth Daily., Disp: 90 tablet, Rfl: 0    linaclotide (Linzess) 290 MCG capsule capsule, Take 1 capsule by mouth Every Morning Before Breakfast., Disp: 90 capsule, Rfl: 2    losartan (COZAAR) 50 MG tablet, Take 1 tablet by mouth Daily., Disp: 30 tablet, Rfl: 2    metFORMIN (GLUCOPHAGE) 500 MG tablet, Take 1 tablet by mouth 2 (Two) Times a Day., Disp: 60 tablet, Rfl: 2    montelukast (SINGULAIR) 10 MG tablet, Take 1 tablet by mouth Daily., Disp: 90 tablet, Rfl: 0    multivitamin with minerals tablet tablet, 1 tablet  Daily., Disp: , Rfl:     pantoprazole (PROTONIX) 40 MG EC tablet, Take 1 tablet by mouth Daily., Disp: 30 tablet, Rfl: 5    propranolol (INDERAL) 10 MG tablet, take 1 Tablet by mouth 3 times daily, Disp: 90 tablet, Rfl: 2    Semaglutide, 2 MG/DOSE, (Ozempic, 2 MG/DOSE,) 8 MG/3ML solution pen-injector, Inject 2 mg under the skin into the appropriate area as directed Every 7 (Seven) Days., Disp: 3 mL, Rfl: 2    simvastatin (ZOCOR) 20 MG tablet, 1 tab(s) orally once a day (at bedtime) 30 day(s), Disp: 30 tablet, Rfl: 5    azelastine (ASTELIN) 0.1 % nasal spray, 2 spray(s) intranasally 2 times a day 30 day(s), Disp: 30 mL, Rfl: 2    Budeson-Glycopyrrol-Formoterol (Breztri Aerosphere) 160-9-4.8 MCG/ACT aerosol inhaler, Inhale 2 puffs 2 (Two) Times a Day., Disp: 32.1 g, Rfl: 4    Cariprazine HCl (Vraylar) 6 MG capsule, Take 1 capsule by mouth once a day, Disp: 30 capsule, Rfl: 2    Cariprazine HCl (Vraylar) 6 MG capsule, Take 1 Capsule by mouth daily, Disp: 30 capsule, Rfl: 2    Cariprazine HCl (Vraylar) 6 MG capsule, Take 1 capsule by mouth Daily., Disp: 30 capsule, Rfl: 2    cetirizine (zyrTEC) 10 MG tablet, Take 1 tablet by mouth Daily., Disp: 90 tablet, Rfl: 3    desvenlafaxine (Pristiq) 100 MG 24 hr tablet, Take 1 tablet by mouth once a day, Disp: 30 tablet, Rfl: 2    Desvenlafaxine  MG tablet sustained-release 24 hour, 1 Tablet by mouth daily, Disp: 30 tablet, Rfl: 2    Desvenlafaxine  MG tablet sustained-release 24 hour, take 1 Tablet by mouth daily (Patient not taking: Reported on 9/9/2024), Disp: 30 tablet, Rfl: 2    Dextromethorphan-buPROPion ER (Auvelity)  MG tablet controlled-release, Take 1 tablet by mouth 2 (Two) Times a Day., Disp: 60 tablet, Rfl: 1    Dextromethorphan-buPROPion ER (Auvelity)  MG tablet controlled-release, 1 Tablet by mouth twice daily, Disp: 60 tablet, Rfl: 1    diclofenac (VOLTAREN) 75 MG EC tablet, Take 1 tablet by mouth 2 (Two) Times a Day. (Patient not taking:  Reported on 9/9/2024), Disp: 30 tablet, Rfl: 0    doxepin (SINEquan) 75 MG capsule, Take 1 capsule by mouth at bedtime, Disp: 30 capsule, Rfl: 2    doxepin (SINEquan) 75 MG capsule, 1 Capsule by mouth at bedtime, Disp: 30 capsule, Rfl: 2    ferrous sulfate 325 (65 FE) MG EC tablet, Take 1 tablet by mouth Daily With Breakfast., Disp: , Rfl:     fluticasone (FLONASE) 50 MCG/ACT nasal spray, 1 spray by Each Nare route 2 (Two) Times a Day., Disp: 16 g, Rfl: 2    furosemide (LASIX) 20 MG tablet, Take 1 tablet by mouth once daily as needed for swelling (Patient not taking: Reported on 9/9/2024), Disp: 30 tablet, Rfl: 2    HYDROcodone-acetaminophen (NORCO) 5-325 MG per tablet, 1 tablet by mouth 2 times a day 30 days (Patient not taking: Reported on 9/9/2024), Disp: 60 tablet, Rfl: 0    hydrocortisone (ANUSOL-HC) 25 MG suppository, Insert 1 suppository into the rectum 2 (Two) Times a Day. (Patient not taking: Reported on 9/9/2024), Disp: 14 suppository, Rfl: 1    hydrOXYzine (ATARAX) 25 MG tablet, Take 1-2 tablet by mouth four times a day as needed, Disp: 120 tablet, Rfl: 2    hydrOXYzine (ATARAX) 25 MG tablet, 1-2 Tablet by mouth 4 times daily, Disp: 240 tablet, Rfl: 99    hydrOXYzine (ATARAX) 25 MG tablet, take 1-2 Tablet by mouth 4 times daily (Patient not taking: Reported on 9/9/2024), Disp: 240 tablet, Rfl: 99    levothyroxine (SYNTHROID, LEVOTHROID) 112 MCG tablet, Take 1 tablet by mouth Daily., Disp: 90 tablet, Rfl: 0    losartan (COZAAR) 50 MG tablet, 1 tab(s) orally once a day 30 day(s), Disp: 30 tablet, Rfl: 2    losartan (COZAAR) 50 MG tablet, Take 1 tablet by mouth Daily., Disp: 30 tablet, Rfl: 2    methocarbamol (ROBAXIN) 750 MG tablet, Take 1 tablet by mouth 3 (Three) Times a Day As Needed for Muscle Spasms. (Patient not taking: Reported on 9/9/2024), Disp: 30 tablet, Rfl: 0    methylPREDNISolone (MEDROL) 4 MG dose pack, Take as directed on package instructions. (Patient not taking: Reported on 9/9/2024),  "Disp: 21 tablet, Rfl: 0    mirtazapine (REMERON) 15 MG tablet, Take 1 tablet by mouth Daily at bedtime, Disp: 30 tablet, Rfl: 2    mirtazapine (REMERON) 15 MG tablet, take 1 Tablet by mouth at bedtime (Patient not taking: Reported on 9/9/2024), Disp: 30 tablet, Rfl: 2    montelukast (SINGULAIR) 10 MG tablet, Take 1 tablet by mouth Every Night., Disp: 90 tablet, Rfl: 3    pantoprazole (Protonix) 40 MG EC tablet, Take 1 tablet by mouth Daily., Disp: 90 tablet, Rfl: 2    Semaglutide, 1 MG/DOSE, (OZEMPIC) 4 MG/3ML solution pen-injector, Inject 1 mg under the skin into the appropriate area as directed Every 7 (Seven) Days. (Patient not taking: Reported on 9/9/2024), Disp: 3 mL, Rfl: 5    Semaglutide, 2 MG/DOSE, (Ozempic, 2 MG/DOSE,) 8 MG/3ML solution pen-injector, Inject 2 mg subcutaneously once a week, Disp: 3 mL, Rfl: 2    simvastatin (ZOCOR) 20 MG tablet, Take 1 tablet by mouth every night at bedtime., Disp: 30 tablet, Rfl: 5    vitamin D (ERGOCALCIFEROL) 1.25 MG (78242 UT) capsule capsule, Take 1 capsule by mouth 2 (Two) Times a Week. Sunday and  Wednesday, Disp: , Rfl:     vitamin D (ERGOCALCIFEROL) 1.25 MG (90889 UT) capsule capsule, Take 1 capsule by mouth 2 (Two) Times a Week., Disp: 25 capsule, Rfl: 3         Vital Signs   /74 (BP Location: Left arm, Patient Position: Sitting, Cuff Size: Large Adult)   Pulse 65   Temp 98.4 °F (36.9 °C)   Resp 16   Ht 157.5 cm (62\")   Wt (!) 138 kg (304 lb)   SpO2 95% Comment: room air  BMI 55.60 kg/m²       Objective     Physical Exam  Vitals reviewed.   Constitutional:       General: She is not in acute distress.     Appearance: Normal appearance. She is obese. She is not ill-appearing.   HENT:      Head: Normocephalic and atraumatic.      Nose: Nose normal.      Mouth/Throat:      Mouth: Mucous membranes are moist.      Pharynx: Oropharynx is clear.   Eyes:      Extraocular Movements: Extraocular movements intact.      Conjunctiva/sclera: Conjunctivae normal.      " Pupils: Pupils are equal, round, and reactive to light.   Cardiovascular:      Rate and Rhythm: Normal rate and regular rhythm.      Pulses: Normal pulses.      Heart sounds: Normal heart sounds.   Pulmonary:      Effort: Pulmonary effort is normal. No respiratory distress.      Breath sounds: Normal breath sounds. No stridor. No wheezing, rhonchi or rales.   Abdominal:      General: Bowel sounds are normal.   Musculoskeletal:         General: Normal range of motion.      Cervical back: Normal range of motion and neck supple.   Skin:     General: Skin is warm and dry.   Neurological:      Mental Status: She is alert and oriented to person, place, and time.   Psychiatric:         Behavior: Behavior normal.         Results Review  I have personally reviewed the prior office notes, hospital records, labs, and diagnostics.    Assessment         Patient Active Problem List   Diagnosis    Severe persistent asthma    LUCIANO on CPAP    Tobacco abuse, in remission    Seasonal allergies    Morbid obesity with BMI of 50.0-59.9, adult    Hyperglycemia    COPD (chronic obstructive pulmonary disease)    Vitamin D deficiency    Type 2 diabetes mellitus without complications    Tobacco abuse    Dyspnea on exertion    Recurrent major depressive disorder, in partial remission    PCOS (polycystic ovarian syndrome)    Numbness in feet    Migraine    Kidney disease    Irritable bowel syndrome with both constipation and diarrhea    Insomnia disorder    Essential (primary) hypertension    Vascular disorder    Hyperlipidemia associated with type 2 diabetes mellitus    History of methicillin resistant staphylococcus aureus (MRSA)    Heart murmur    Foot ulcer    Forgetfulness    Foot pain, bilateral    Chronic fatigue    Anxiety and depression    Bipolar disorder    Bipolar I disorder, single manic episode    Asthma    Arthritis    Arthralgia of multiple joints    Apnea    Anticoagulated on Coumadin    Hypothyroidism    Gastro-esophageal  reflux disease without esophagitis    Chronic bilateral low back pain with sciatica    Chronic knee pain    Anemia        Plan     Diagnoses and all orders for this visit:    1. Severe persistent asthma, unspecified whether complicated (Primary)  Comments:  Continue with Zyrtec and Singulair as prescribed    2. Asthma-COPD overlap syndrome  Comments:  Continue with Breztri 2 puffs twice daily rinsing out her mouth after each use to prevent oral thrush    3. LUCIANO on CPAP  Comments:  Patient compliant and benefiting from use of CPAP machine    4. Morbid obesity with BMI of 50.0-59.9, adult    5. Cigarette nicotine dependence without complication  Comments:  Low-dose lung cancer screening ordered  Orders:  -      CT Chest Low Dose Cancer Screening WO; Future    6. Dyspnea on exertion  Comments:  Continue with albuterol and duo nebulizer as needed for shortness of air or wheeze             Smoking status:  reports that she has been smoking cigarettes. She has a 6.3 pack-year smoking history. She has never used smokeless tobacco.  Vaccination status: Reviewed  Immunization History   Administered Date(s) Administered    COVID-19 (PFIZER) Purple Cap Monovalent 08/07/2021, 08/28/2021    Flu Vaccine Split Quad 11/23/2015, 10/21/2016, 09/12/2017, 09/17/2018    Fluzone  >6mos 10/03/2014    Fluzone (or Fluarix & Flulaval for VFC) >6mos 10/03/2014, 11/23/2015, 10/21/2016, 09/12/2017, 09/17/2018    Hepatitis A 09/17/2018    Influenza Inj MDCK Preserative Free 08/13/2024    Influenza Injectable Mdck Pf Quad 09/23/2021, 10/09/2023    Influenza Seasonal Injectable 11/19/2009, 11/15/2013    Influenza, Unspecified 09/17/2018, 09/13/2020    Pneumococcal Polysaccharide (PPSV23) 11/19/2009, 10/08/2014    Tdap 11/13/2009, 10/21/2020      Medications personally reviewed    Follow Up  Return in about 6 months (around 3/9/2025).    Patient was given instructions and counseling regarding her condition or for health maintenance advice. Please  see specific information pulled into the AVS if appropriate.     I spent 15 minutes caring for Jaycee Le on this date of service. This time includes time spent by me in the following activities:preparing for the visit, reviewing tests, obtaining and/or reviewing a separately obtained history, performing a medically appropriate examination and/or evaluation, counseling and educating the patient/family/caregiver, ordering medications, tests, or procedures, documenting information in the medical record, independently interpreting results and communicating that information with the patient/family/caregiver and answered questions family members, discuss medications.

## 2024-09-09 ENCOUNTER — OFFICE VISIT (OUTPATIENT)
Dept: PULMONOLOGY | Facility: CLINIC | Age: 49
End: 2024-09-09
Payer: COMMERCIAL

## 2024-09-09 VITALS
RESPIRATION RATE: 16 BRPM | SYSTOLIC BLOOD PRESSURE: 124 MMHG | HEART RATE: 65 BPM | WEIGHT: 293 LBS | BODY MASS INDEX: 53.92 KG/M2 | TEMPERATURE: 98.4 F | DIASTOLIC BLOOD PRESSURE: 74 MMHG | HEIGHT: 62 IN | OXYGEN SATURATION: 95 %

## 2024-09-09 DIAGNOSIS — J44.89 ASTHMA-COPD OVERLAP SYNDROME: ICD-10-CM

## 2024-09-09 DIAGNOSIS — J45.50 SEVERE PERSISTENT ASTHMA, UNSPECIFIED WHETHER COMPLICATED: Primary | ICD-10-CM

## 2024-09-09 DIAGNOSIS — E66.01 MORBID OBESITY WITH BMI OF 50.0-59.9, ADULT: ICD-10-CM

## 2024-09-09 DIAGNOSIS — R06.09 DYSPNEA ON EXERTION: ICD-10-CM

## 2024-09-09 DIAGNOSIS — F17.210 CIGARETTE NICOTINE DEPENDENCE WITHOUT COMPLICATION: ICD-10-CM

## 2024-09-09 DIAGNOSIS — G47.33 OSA ON CPAP: ICD-10-CM

## 2024-09-09 PROCEDURE — 99214 OFFICE O/P EST MOD 30 MIN: CPT | Performed by: NURSE PRACTITIONER

## 2024-10-01 ENCOUNTER — TELEPHONE (OUTPATIENT)
Dept: OBSTETRICS AND GYNECOLOGY | Facility: CLINIC | Age: 49
End: 2024-10-01
Payer: COMMERCIAL

## 2024-10-21 NOTE — PROGRESS NOTES
"Well Woman Visit    CC: Scheduled annual well gyn visit  Chief Complaint   Patient presents with    Gynecologic Exam     wwe       HPI:   48 y.o.   Social History     Substance and Sexual Activity   Sexual Activity Yes    Partners: Male    Birth control/protection: Tubal ligation       48-year-old female G2, P2 here for annual exam.   Last colonoscopy  needs follow-up in .  Last mammogram was in May 2024.  She status post ablation.    Pt has no complaints today.    PCP: does manage PMHx and preventative labs  History: PMHx, Meds, Allergies, PSHx, Social Hx, and POBHx all reviewed and updated.    PHYSICAL EXAM:  /86 (BP Location: Left arm, Patient Position: Sitting, Cuff Size: Large Adult)   Pulse 96   Ht 157.5 cm (62\")   Wt (!) 138 kg (303 lb 6.4 oz)   Breastfeeding No   BMI 55.49 kg/m²  Not found.  Chaperone present  General- NAD, alert and oriented, appropriate  Psych- Normal mood, good memory  Neck- No masses, no thyroid enlargement  CV- Regular rhythm, no murmurs  Resp- CTA to bases, no wheezes  Abdomen- Soft, non distended, non tender, no masses    Chaperone present during breast and/or pelvic exam if performed.  Breast left-  Bilaterally symmetrical, no masses, non tender, no nipple discharge, no axillary or supraclavicular nodes palpable.    Breast right- Bilaterally symmetrical, no masses, non tender, no nipple discharge, no axillary or supraclavicular nodes palpable.      External genitalia- Normal female, no lesions  Urethra/meatus- Normal, no masses, non tender  Bladder- Normal, no masses, non tender  Vagina- Normal, no atrophy, no lesions, no discharge.    Prolapse : none noted   Cvx- Normal, no lesions, no discharge, No cervical motion tenderness, very difficult to visualize even with long speculum  Uterus- Normal size, shape & consistency.  Non tender, mobile.,  Exam suboptimal secondary to body habitus  Adnexa- No mass, non tender, exam suboptimal secondary to body " habitus  Anus/Rectum/Perineum- Normal appearance, no mass, good sphincter tone, no hemorrhoids, no prolapse, Hemoccult NEGATIVE    Lymphatic- No palpable neck, axillary, or groin nodes  Ext- No edema, no cyanosis    Skin- No lesions, no rashes, no acanthosis nigricans      ASSESSMENT and PLAN:    Diagnoses and all orders for this visit:    1. Well woman exam with routine gynecological exam (Primary)  -     IgP, Aptima HPV  -     Mammo Screening Digital Tomosynthesis Bilateral With CAD; Future        Preventative:  BREAST HEALTH- Monthly self breast exam importance and how to reviewed. MMG and/or MRI (prn) reviewed per society guidelines and her individual history. Screen: Updated today  CERVICAL CANCER Screening- Reviewed current ASCCP guidelines for screening w and wo cotest HPV, age specific.  Screen: Updated today  COLON CANCER Screening- Reviewed current medical society guidelines and options.  Screen:  Already up to date  Follow up PCP/Specialist PMHx and/or Labs          She understands the importance of having any ordered tests to be performed in a timely fashion.  The risks of not performing them include, but are not limited to, advanced cancer stages, bone loss from osteoporosis and/or subsequent increase in morbidity and/or mortality.  She is encouraged to review her results online and/or contact or office if she has questions.     Follow Up:  Return in about 1 year (around 10/23/2025) for Annual physical.            Ignacia Yi, DO  10/23/2024    [facility]  [facility] [place] [address]  Dept: [phone]  Dept Fax: [phone]  Loc: [phone]  Loc Fax: [phone]

## 2024-10-23 ENCOUNTER — OFFICE VISIT (OUTPATIENT)
Dept: OBSTETRICS AND GYNECOLOGY | Facility: CLINIC | Age: 49
End: 2024-10-23
Payer: COMMERCIAL

## 2024-10-23 VITALS
HEIGHT: 62 IN | DIASTOLIC BLOOD PRESSURE: 86 MMHG | WEIGHT: 293 LBS | SYSTOLIC BLOOD PRESSURE: 123 MMHG | HEART RATE: 96 BPM | BODY MASS INDEX: 53.92 KG/M2

## 2024-10-23 DIAGNOSIS — Z01.419 WELL WOMAN EXAM WITH ROUTINE GYNECOLOGICAL EXAM: Primary | ICD-10-CM

## 2024-10-23 PROCEDURE — 99396 PREV VISIT EST AGE 40-64: CPT | Performed by: OBSTETRICS & GYNECOLOGY

## 2024-10-23 PROCEDURE — 87624 HPV HI-RISK TYP POOLED RSLT: CPT | Performed by: OBSTETRICS & GYNECOLOGY

## 2024-10-23 PROCEDURE — G0123 SCREEN CERV/VAG THIN LAYER: HCPCS | Performed by: OBSTETRICS & GYNECOLOGY

## 2024-10-23 RX ORDER — CYCLOBENZAPRINE HCL 10 MG
TABLET ORAL
COMMUNITY

## 2024-10-29 LAB
CYTOLOGIST CVX/VAG CYTO: NORMAL
CYTOLOGY CVX/VAG DOC CYTO: NORMAL
CYTOLOGY CVX/VAG DOC THIN PREP: NORMAL
DX ICD CODE: NORMAL
HPV I/H RISK 4 DNA CVX QL PROBE+SIG AMP: NEGATIVE
Lab: NORMAL
OTHER STN SPEC: NORMAL
STAT OF ADQ CVX/VAG CYTO-IMP: NORMAL

## 2024-12-17 ENCOUNTER — TELEPHONE (OUTPATIENT)
Dept: GASTROENTEROLOGY | Facility: CLINIC | Age: 49
End: 2024-12-17
Payer: COMMERCIAL

## 2024-12-17 RX ORDER — FAMOTIDINE 40 MG/1
40 TABLET, FILM COATED ORAL 2 TIMES DAILY
Qty: 180 TABLET | Refills: 1 | Status: SHIPPED | OUTPATIENT
Start: 2024-12-17

## 2024-12-17 NOTE — TELEPHONE ENCOUNTER
"Caller: Jaycee Le \"Viktoriya\"    Relationship: Self    Best call back number: [phone]    Which medication are you concerned about: PANTOPRAZOLE    Who prescribed you this medication: DAVID FRYE    When did you start taking this medication: 5 YEARS    What are your concerns: NOT WORKING STILL HAVING HORRIBLE ACID AFTER EATING    How long have you had these concerns: FOR THE LAST MONTH PATIENT DID SCHEDULE AN APPT BUT NOT ABLE TO BE SEEN UNTIL MAY 1 PATIENT WOULD LIEK TO SEE IF THERE IS SOMETHING ELSE SHE CAN TAKE OR AN OTC MEDICATION TO HOLD HER OVER UNTIL HER VISIT IN MAY ALSO ADDED PATIENT TO THE WAIT LIST          "

## 2024-12-17 NOTE — TELEPHONE ENCOUNTER
I would recommend adding Pepcid twice daily and continue pantoprazole.  I have sent Pepcid to her pharmacy.

## 2025-02-05 DIAGNOSIS — K58.1 IRRITABLE BOWEL SYNDROME WITH CONSTIPATION: ICD-10-CM

## 2025-02-05 RX ORDER — DICYCLOMINE HCL 20 MG
20 TABLET ORAL EVERY 6 HOURS PRN
Qty: 120 TABLET | Refills: 3 | Status: SHIPPED | OUTPATIENT
Start: 2025-02-05

## 2025-02-05 NOTE — TELEPHONE ENCOUNTER
"   Caller: Jaycee Le \"Viktoriya\"    Relationship: Self    Best call back number:  [phone]    Requested Prescriptions:   Requested Prescriptions     Pending Prescriptions Disp Refills    dicyclomine (BENTYL) 20 MG tablet 120 tablet 3     Sig: Take 1 tablet by mouth Every 6 (Six) Hours As Needed (abdominal pain and diarrhea).        Pharmacy where request should be sent:    [facility] [address] [place] [address]   Phone: [phone] Fax: [phone]   Hours: Monday to Friday 9 AM to 7:30 PM, Saturday 9 AM to 2 PM       Last office visit with prescribing clinician: 2/7/2024   Last telemedicine visit with prescribing clinician: Visit date not found   Next office visit with prescribing clinician: 5/1/2025     Additional details provided by patient: PT IS HAVING A FLARE UP AND IS NEEDING A REFILL    Does the patient have less than a 3 day supply:  [x] Yes  [] No    Would you like a call back once the refill request has been completed: [x] Yes [] No    If the office needs to give you a call back, can they leave a voicemail: [x] Yes [] No    Otis Stone   02/05/25 10:56 EST            "

## 2025-02-05 NOTE — TELEPHONE ENCOUNTER
Pt is requesting a refill of dicyclomine 20 mg   Last refill: 2/7/24  Last ov:  2/7/24  Next ov: 5/1/25

## 2025-04-18 RX ORDER — BUDESONIDE, GLYCOPYRROLATE, AND FORMOTEROL FUMARATE 160; 9; 4.8 UG/1; UG/1; UG/1
2 AEROSOL, METERED RESPIRATORY (INHALATION) 2 TIMES DAILY
Qty: 10.7 G | Refills: 5 | Status: SHIPPED | OUTPATIENT
Start: 2025-04-18

## 2025-04-19 DIAGNOSIS — K59.04 CHRONIC IDIOPATHIC CONSTIPATION: ICD-10-CM

## 2025-04-21 DIAGNOSIS — K59.04 CHRONIC IDIOPATHIC CONSTIPATION: ICD-10-CM

## 2025-04-21 NOTE — TELEPHONE ENCOUNTER
Pt is requesting a refill of Linzess 290 capsule  Last refill: 2/14/2024   Last ov: 2/7/2024  Next ov: 5/1/2025

## 2025-04-22 ENCOUNTER — TELEPHONE (OUTPATIENT)
Dept: PULMONOLOGY | Facility: CLINIC | Age: 50
End: 2025-04-22
Payer: COMMERCIAL

## 2025-04-22 NOTE — TELEPHONE ENCOUNTER
Patient said her breztri needs a PA. She has new insurance and it is passport on file. Please advise, thank you.

## 2025-04-25 ENCOUNTER — HOSPITAL ENCOUNTER (OUTPATIENT)
Dept: CT IMAGING | Facility: HOSPITAL | Age: 50
Discharge: HOME | End: 2025-04-25
Payer: COMMERCIAL

## 2025-04-25 DIAGNOSIS — F17.210 CIGARETTE NICOTINE DEPENDENCE WITHOUT COMPLICATION: ICD-10-CM

## 2025-04-25 PROCEDURE — 71271 CT THORAX LUNG CANCER SCR C-: CPT

## 2025-05-01 ENCOUNTER — TELEPHONE (OUTPATIENT)
Dept: GASTROENTEROLOGY | Facility: CLINIC | Age: 50
End: 2025-05-01

## 2025-05-01 NOTE — TELEPHONE ENCOUNTER
Contacted Jaycee Le 1973 regarding the appointment no show with TAMIA Phelan on 05/01/2025. Patient is aware that there is a 24-hour cancellation policy and understands that a no-show letter will be mailed to them at the address on file.The patient has rescheduled to 11/26/2025 and added to the cancellation list.

## 2025-05-26 RX ORDER — FAMOTIDINE 40 MG/1
40 TABLET, FILM COATED ORAL 2 TIMES DAILY
Qty: 180 TABLET | Refills: 1 | Status: SHIPPED | OUTPATIENT
Start: 2025-05-26

## 2025-06-05 ENCOUNTER — PATIENT MESSAGE (OUTPATIENT)
Dept: OBSTETRICS AND GYNECOLOGY | Age: 50
End: 2025-06-05
Payer: COMMERCIAL

## 2025-07-30 ENCOUNTER — TRANSCRIBE ORDERS (OUTPATIENT)
Dept: ADMINISTRATIVE | Facility: HOSPITAL | Age: 50
End: 2025-07-30
Payer: COMMERCIAL

## 2025-07-30 DIAGNOSIS — N63.0 BREAST MASS IN FEMALE: Primary | ICD-10-CM

## 2025-08-06 ENCOUNTER — HOSPITAL ENCOUNTER (OUTPATIENT)
Dept: ULTRASOUND IMAGING | Facility: HOSPITAL | Age: 50
Discharge: HOME | End: 2025-08-06
Payer: COMMERCIAL

## 2025-08-06 ENCOUNTER — HOSPITAL ENCOUNTER (OUTPATIENT)
Dept: MAMMOGRAPHY | Facility: HOSPITAL | Age: 50
Discharge: HOME | End: 2025-08-06
Payer: COMMERCIAL

## 2025-08-06 DIAGNOSIS — N63.0 BREAST MASS IN FEMALE: ICD-10-CM

## 2025-08-06 PROCEDURE — G0279 TOMOSYNTHESIS, MAMMO: HCPCS

## 2025-08-06 PROCEDURE — 77066 DX MAMMO INCL CAD BI: CPT

## 2025-08-06 PROCEDURE — 76642 ULTRASOUND BREAST LIMITED: CPT

## 2025-08-15 DIAGNOSIS — J45.50 SEVERE PERSISTENT ASTHMA, UNSPECIFIED WHETHER COMPLICATED: ICD-10-CM

## 2025-08-15 DIAGNOSIS — J44.89 ASTHMA-COPD OVERLAP SYNDROME: ICD-10-CM

## 2025-08-18 RX ORDER — ALBUTEROL SULFATE 90 UG/1
2 INHALANT RESPIRATORY (INHALATION) EVERY 4 HOURS PRN
Qty: 18 G | Refills: 11 | OUTPATIENT
Start: 2025-08-18